# Patient Record
Sex: FEMALE | Race: WHITE | Employment: OTHER | URBAN - METROPOLITAN AREA
[De-identification: names, ages, dates, MRNs, and addresses within clinical notes are randomized per-mention and may not be internally consistent; named-entity substitution may affect disease eponyms.]

---

## 2017-01-20 DIAGNOSIS — F32.A DEPRESSIVE DISORDER: ICD-10-CM

## 2017-01-20 RX ORDER — VENLAFAXINE HYDROCHLORIDE 75 MG/1
CAPSULE, EXTENDED RELEASE ORAL
Qty: 90 CAPSULE | Refills: 3 | Status: SHIPPED | OUTPATIENT
Start: 2017-01-20 | End: 2018-02-15 | Stop reason: SDUPTHER

## 2017-01-24 DIAGNOSIS — I10 ESSENTIAL HYPERTENSION, BENIGN: ICD-10-CM

## 2017-01-25 RX ORDER — AMLODIPINE BESYLATE 5 MG/1
TABLET ORAL
Qty: 90 TABLET | Refills: 3 | Status: SHIPPED | OUTPATIENT
Start: 2017-01-25 | End: 2018-02-15 | Stop reason: SDUPTHER

## 2017-02-17 ENCOUNTER — OFFICE VISIT (OUTPATIENT)
Dept: FAMILY MEDICINE CLINIC | Age: 67
End: 2017-02-17

## 2017-02-17 VITALS
BODY MASS INDEX: 27.97 KG/M2 | HEART RATE: 68 BPM | SYSTOLIC BLOOD PRESSURE: 121 MMHG | WEIGHT: 174 LBS | HEIGHT: 66 IN | DIASTOLIC BLOOD PRESSURE: 67 MMHG

## 2017-02-17 DIAGNOSIS — E78.00 HYPERCHOLESTEROLEMIA: Primary | ICD-10-CM

## 2017-02-17 DIAGNOSIS — R35.0 URINARY FREQUENCY: ICD-10-CM

## 2017-02-17 DIAGNOSIS — F32.A DEPRESSIVE DISORDER: ICD-10-CM

## 2017-02-17 DIAGNOSIS — I10 ESSENTIAL HYPERTENSION: ICD-10-CM

## 2017-02-17 PROCEDURE — 99214 OFFICE O/P EST MOD 30 MIN: CPT | Performed by: FAMILY MEDICINE

## 2017-03-16 RX ORDER — OXYBUTYNIN CHLORIDE 5 MG/1
TABLET ORAL
Qty: 180 TABLET | Refills: 3 | Status: SHIPPED | OUTPATIENT
Start: 2017-03-16 | End: 2018-02-15 | Stop reason: SDUPTHER

## 2017-05-26 ENCOUNTER — OFFICE VISIT (OUTPATIENT)
Dept: FAMILY MEDICINE CLINIC | Age: 67
End: 2017-05-26

## 2017-05-26 VITALS
HEART RATE: 68 BPM | DIASTOLIC BLOOD PRESSURE: 69 MMHG | SYSTOLIC BLOOD PRESSURE: 123 MMHG | BODY MASS INDEX: 27.64 KG/M2 | HEIGHT: 66 IN | RESPIRATION RATE: 16 BRPM | WEIGHT: 172 LBS

## 2017-05-26 DIAGNOSIS — F32.A DEPRESSIVE DISORDER: ICD-10-CM

## 2017-05-26 DIAGNOSIS — I10 ESSENTIAL HYPERTENSION, BENIGN: ICD-10-CM

## 2017-05-26 DIAGNOSIS — J30.1 SEASONAL ALLERGIC RHINITIS DUE TO POLLEN: ICD-10-CM

## 2017-05-26 DIAGNOSIS — H91.93 HEARING LOSS, BILATERAL: ICD-10-CM

## 2017-05-26 DIAGNOSIS — Z23 NEED FOR PNEUMOCOCCAL VACCINATION: ICD-10-CM

## 2017-05-26 DIAGNOSIS — E78.00 HYPERCHOLESTEROLEMIA: Primary | ICD-10-CM

## 2017-05-26 LAB
A/G RATIO: 1.8 (ref 1.1–2.2)
ALBUMIN SERPL-MCNC: 4.6 G/DL (ref 3.4–5)
ALP BLD-CCNC: 78 U/L (ref 40–129)
ALT SERPL-CCNC: 18 U/L (ref 10–40)
ANION GAP SERPL CALCULATED.3IONS-SCNC: 13 MMOL/L (ref 3–16)
AST SERPL-CCNC: 21 U/L (ref 15–37)
BILIRUB SERPL-MCNC: 0.3 MG/DL (ref 0–1)
BUN BLDV-MCNC: 14 MG/DL (ref 7–20)
CALCIUM SERPL-MCNC: 10 MG/DL (ref 8.3–10.6)
CHLORIDE BLD-SCNC: 101 MMOL/L (ref 99–110)
CO2: 29 MMOL/L (ref 21–32)
CREAT SERPL-MCNC: 0.6 MG/DL (ref 0.6–1.2)
GFR AFRICAN AMERICAN: >60
GFR NON-AFRICAN AMERICAN: >60
GLOBULIN: 2.5 G/DL
GLUCOSE BLD-MCNC: 95 MG/DL (ref 70–99)
POTASSIUM SERPL-SCNC: 4.7 MMOL/L (ref 3.5–5.1)
SODIUM BLD-SCNC: 143 MMOL/L (ref 136–145)
TOTAL PROTEIN: 7.1 G/DL (ref 6.4–8.2)

## 2017-05-26 PROCEDURE — G8399 PT W/DXA RESULTS DOCUMENT: HCPCS | Performed by: FAMILY MEDICINE

## 2017-05-26 PROCEDURE — 3014F SCREEN MAMMO DOC REV: CPT | Performed by: FAMILY MEDICINE

## 2017-05-26 PROCEDURE — 1123F ACP DISCUSS/DSCN MKR DOCD: CPT | Performed by: FAMILY MEDICINE

## 2017-05-26 PROCEDURE — 1036F TOBACCO NON-USER: CPT | Performed by: FAMILY MEDICINE

## 2017-05-26 PROCEDURE — G0009 ADMIN PNEUMOCOCCAL VACCINE: HCPCS | Performed by: FAMILY MEDICINE

## 2017-05-26 PROCEDURE — 3017F COLORECTAL CA SCREEN DOC REV: CPT | Performed by: FAMILY MEDICINE

## 2017-05-26 PROCEDURE — 36415 COLL VENOUS BLD VENIPUNCTURE: CPT | Performed by: FAMILY MEDICINE

## 2017-05-26 PROCEDURE — G8427 DOCREV CUR MEDS BY ELIG CLIN: HCPCS | Performed by: FAMILY MEDICINE

## 2017-05-26 PROCEDURE — 99214 OFFICE O/P EST MOD 30 MIN: CPT | Performed by: FAMILY MEDICINE

## 2017-05-26 PROCEDURE — 1090F PRES/ABSN URINE INCON ASSESS: CPT | Performed by: FAMILY MEDICINE

## 2017-05-26 PROCEDURE — 4040F PNEUMOC VAC/ADMIN/RCVD: CPT | Performed by: FAMILY MEDICINE

## 2017-05-26 PROCEDURE — 90670 PCV13 VACCINE IM: CPT | Performed by: FAMILY MEDICINE

## 2017-05-26 PROCEDURE — G8420 CALC BMI NORM PARAMETERS: HCPCS | Performed by: FAMILY MEDICINE

## 2017-05-26 ASSESSMENT — PATIENT HEALTH QUESTIONNAIRE - PHQ9
SUM OF ALL RESPONSES TO PHQ QUESTIONS 1-9: 0
2. FEELING DOWN, DEPRESSED OR HOPELESS: 0
1. LITTLE INTEREST OR PLEASURE IN DOING THINGS: 0
SUM OF ALL RESPONSES TO PHQ9 QUESTIONS 1 & 2: 0

## 2017-07-06 ENCOUNTER — PATIENT MESSAGE (OUTPATIENT)
Dept: FAMILY MEDICINE CLINIC | Age: 67
End: 2017-07-06

## 2017-09-13 RX ORDER — SIMVASTATIN 20 MG
TABLET ORAL
Qty: 90 TABLET | Refills: 3 | Status: SHIPPED | OUTPATIENT
Start: 2017-09-13 | End: 2018-06-13 | Stop reason: SDUPTHER

## 2017-12-07 ENCOUNTER — OFFICE VISIT (OUTPATIENT)
Dept: FAMILY MEDICINE CLINIC | Age: 67
End: 2017-12-07

## 2017-12-07 VITALS
HEIGHT: 66 IN | DIASTOLIC BLOOD PRESSURE: 72 MMHG | WEIGHT: 173 LBS | HEART RATE: 76 BPM | BODY MASS INDEX: 27.8 KG/M2 | SYSTOLIC BLOOD PRESSURE: 118 MMHG | RESPIRATION RATE: 20 BRPM

## 2017-12-07 DIAGNOSIS — I10 ESSENTIAL HYPERTENSION, BENIGN: ICD-10-CM

## 2017-12-07 DIAGNOSIS — Z23 NEEDS FLU SHOT: ICD-10-CM

## 2017-12-07 DIAGNOSIS — E78.00 HYPERCHOLESTEROLEMIA: Primary | ICD-10-CM

## 2017-12-07 DIAGNOSIS — F32.A DEPRESSIVE DISORDER: ICD-10-CM

## 2017-12-07 LAB
A/G RATIO: 1.9 (ref 1.1–2.2)
ALBUMIN SERPL-MCNC: 4.7 G/DL (ref 3.4–5)
ALP BLD-CCNC: 94 U/L (ref 40–129)
ALT SERPL-CCNC: 18 U/L (ref 10–40)
ANION GAP SERPL CALCULATED.3IONS-SCNC: 13 MMOL/L (ref 3–16)
AST SERPL-CCNC: 20 U/L (ref 15–37)
BILIRUB SERPL-MCNC: 0.4 MG/DL (ref 0–1)
BUN BLDV-MCNC: 14 MG/DL (ref 7–20)
CALCIUM SERPL-MCNC: 9.7 MG/DL (ref 8.3–10.6)
CHLORIDE BLD-SCNC: 102 MMOL/L (ref 99–110)
CHOLESTEROL, TOTAL: 215 MG/DL (ref 0–199)
CO2: 30 MMOL/L (ref 21–32)
CREAT SERPL-MCNC: 0.6 MG/DL (ref 0.6–1.2)
GFR AFRICAN AMERICAN: >60
GFR NON-AFRICAN AMERICAN: >60
GLOBULIN: 2.5 G/DL
GLUCOSE BLD-MCNC: 96 MG/DL (ref 70–99)
HDLC SERPL-MCNC: 103 MG/DL (ref 40–60)
LDL CHOLESTEROL CALCULATED: 88 MG/DL
POTASSIUM SERPL-SCNC: 4.5 MMOL/L (ref 3.5–5.1)
SODIUM BLD-SCNC: 145 MMOL/L (ref 136–145)
TOTAL PROTEIN: 7.2 G/DL (ref 6.4–8.2)
TRIGL SERPL-MCNC: 121 MG/DL (ref 0–150)
VLDLC SERPL CALC-MCNC: 24 MG/DL

## 2017-12-07 PROCEDURE — 90630 INFLUENZA, QUADV, 18-64 YRS, ID, PF, MICRO INJ, 0.1ML (FLUZONE QUADV, PF): CPT | Performed by: FAMILY MEDICINE

## 2017-12-07 PROCEDURE — 1036F TOBACCO NON-USER: CPT | Performed by: FAMILY MEDICINE

## 2017-12-07 PROCEDURE — 1123F ACP DISCUSS/DSCN MKR DOCD: CPT | Performed by: FAMILY MEDICINE

## 2017-12-07 PROCEDURE — 99214 OFFICE O/P EST MOD 30 MIN: CPT | Performed by: FAMILY MEDICINE

## 2017-12-07 PROCEDURE — 3017F COLORECTAL CA SCREEN DOC REV: CPT | Performed by: FAMILY MEDICINE

## 2017-12-07 PROCEDURE — 4040F PNEUMOC VAC/ADMIN/RCVD: CPT | Performed by: FAMILY MEDICINE

## 2017-12-07 PROCEDURE — G8427 DOCREV CUR MEDS BY ELIG CLIN: HCPCS | Performed by: FAMILY MEDICINE

## 2017-12-07 PROCEDURE — G8399 PT W/DXA RESULTS DOCUMENT: HCPCS | Performed by: FAMILY MEDICINE

## 2017-12-07 PROCEDURE — G8417 CALC BMI ABV UP PARAM F/U: HCPCS | Performed by: FAMILY MEDICINE

## 2017-12-07 PROCEDURE — G0008 ADMIN INFLUENZA VIRUS VAC: HCPCS | Performed by: FAMILY MEDICINE

## 2017-12-07 PROCEDURE — G8484 FLU IMMUNIZE NO ADMIN: HCPCS | Performed by: FAMILY MEDICINE

## 2017-12-07 PROCEDURE — 36415 COLL VENOUS BLD VENIPUNCTURE: CPT | Performed by: FAMILY MEDICINE

## 2017-12-07 PROCEDURE — 1090F PRES/ABSN URINE INCON ASSESS: CPT | Performed by: FAMILY MEDICINE

## 2017-12-07 NOTE — PROGRESS NOTES
Chief Complaint   Patient presents with    Hypertension    Hyperlipidemia       HPI: Ashley Diamond presents for evaluation and management of Multiple medical problems. Ligia Hassan notes she is taking and tolerating her Zocor denies any muscle pain with that. Notes no abdominal pain either. She reports she is taking and tolerating her blood pressure medications    She notes her mood is good denies any suicidal or homicidal ideation no racing thoughts and is compliant with her Effexor. She is looking for to the holidays with her family    In the last 3 months she's had 2 urinary tract infections in is a little worried about that    ROS: no fever or chills, no cough, no sob, no chest pain, no palpitation, no abd pain, no n/v/d/c, no urinary frequency or dysuria,     No Known Allergies  New Prescriptions    No medications on file     Current Outpatient Prescriptions   Medication Sig Dispense Refill    simvastatin (ZOCOR) 20 MG tablet TAKE 1 TABLET BY MOUTH DAILY 90 tablet 3    oxybutynin (DITROPAN) 5 MG tablet TAKE 1 TABLET BY MOUTH TWICE DAILY 180 tablet 3    metoprolol tartrate (LOPRESSOR) 25 MG tablet TAKE 1 TABLET BY MOUTH TWICE DAILY 180 tablet 3    amLODIPine (NORVASC) 5 MG tablet TAKE 1 TABLET BY MOUTH EVERY DAY 90 tablet 3    venlafaxine (EFFEXOR XR) 75 MG extended release capsule TAKE 1 CAPSULE BY MOUTH DAILY 90 capsule 3    azelastine (ASTELIN) 0.1 % nasal spray 2 sprays by Nasal route 2 times daily Use in each nostril as directed 1 Bottle 3    fluticasone (FLONASE) 50 MCG/ACT nasal spray INSTILL 1 SPRAY INTO THE NOSTRILS DAILY 1 Bottle 0    Omega-3 Fatty Acids (FISH OIL PO) Take  by mouth.  Multiple Vitamins-Minerals (THERAPEUTIC MULTIVITAMIN-MINERALS) tablet Take 1 tablet by mouth daily.  Ascorbic Acid (VITAMIN C) 500 MG tablet Take 500 mg by mouth daily.  vitamin D (ERGOCALCIFEROL) 70417 UNITS CAPS capsule Take 50,000 Units by mouth once a week.        No current 11/18/2016    HDL 82 (H) 07/13/2015    HDL 81 (H) 07/10/2014     Lab Results   Component Value Date    LDLCALC 111 (H) 11/18/2016    LDLCALC 111 (H) 07/13/2015    LDLCALC 102 (H) 07/10/2014     Lab Results   Component Value Date    LABVLDL 42 11/18/2016    LABVLDL 28 07/13/2015    LABVLDL 32 07/10/2014         Assessment   Plan     1. Hypercholesterolemia  Doing well: We will check labs today and follow up in 6 months continue medications  - Comprehensive Metabolic Panel  - Lipid Panel    2. Essential hypertension, benign  Controlled with medication: Check labs today follow-up 6 months  - Comprehensive Metabolic Panel    3. Depressive disorder  Appears stable and controlled on medication. Continue and recheck 6 months    4. Needs flu shot  Davidbeatriz Duboisleandra received counseling on the following healthy behaviors: Prevention of urinary tract infections    Discussed use, benefit, and side effects of prescribed medications. Barriers to medication compliance addressed. All patient questions answered. Pt voiced understanding.        RTC in 6 months

## 2017-12-07 NOTE — PROGRESS NOTES
Vaccine Information Sheet, \"Influenza - Inactivated\"  given to [place] Reasons, or parent/legal guardian of  [place] Reasons and verbalized understanding. Patient responses:    Have you ever had a reaction to a flu vaccine? No  Are you able to eat eggs without adverse effects? Yes  Do you have any current illness? No  Have you ever had Guillian Cleveland Syndrome? No    Flu vaccine given per order. Please see immunization tab.

## 2018-02-14 ENCOUNTER — PATIENT MESSAGE (OUTPATIENT)
Dept: FAMILY MEDICINE CLINIC | Age: 68
End: 2018-02-14

## 2018-02-14 NOTE — TELEPHONE ENCOUNTER
From: Kevin Viveros  To: Deepak Guerrero MD  Sent: 2/14/2018 4:50 PM EST  Subject: Prescription Question    I have changed my pharmacy to [facility] on [address] [place] [address] [phone]. Thank you.     Rafael Cristobal

## 2018-02-15 DIAGNOSIS — I10 ESSENTIAL HYPERTENSION, BENIGN: ICD-10-CM

## 2018-02-15 DIAGNOSIS — I10 ESSENTIAL HYPERTENSION: ICD-10-CM

## 2018-02-15 DIAGNOSIS — F32.A DEPRESSIVE DISORDER: ICD-10-CM

## 2018-02-15 RX ORDER — VENLAFAXINE HYDROCHLORIDE 75 MG/1
75 CAPSULE, EXTENDED RELEASE ORAL DAILY
Qty: 90 CAPSULE | Refills: 3 | Status: SHIPPED | OUTPATIENT
Start: 2018-02-15 | End: 2019-02-27 | Stop reason: SDUPTHER

## 2018-02-15 RX ORDER — SIMVASTATIN 20 MG
20 TABLET ORAL NIGHTLY
Qty: 90 TABLET | Refills: 3 | Status: CANCELLED | OUTPATIENT
Start: 2018-02-15

## 2018-02-15 RX ORDER — OXYBUTYNIN CHLORIDE 5 MG/1
5 TABLET ORAL 2 TIMES DAILY
Qty: 180 TABLET | Refills: 3 | Status: SHIPPED | OUTPATIENT
Start: 2018-02-15 | End: 2019-05-13

## 2018-02-15 RX ORDER — AMLODIPINE BESYLATE 5 MG/1
5 TABLET ORAL DAILY
Qty: 90 TABLET | Refills: 3 | Status: SHIPPED | OUTPATIENT
Start: 2018-02-15 | End: 2019-02-27 | Stop reason: SDUPTHER

## 2018-05-31 ENCOUNTER — TELEPHONE (OUTPATIENT)
Dept: FAMILY MEDICINE CLINIC | Age: 68
End: 2018-05-31

## 2018-06-01 ENCOUNTER — PATIENT MESSAGE (OUTPATIENT)
Dept: FAMILY MEDICINE CLINIC | Age: 68
End: 2018-06-01

## 2018-06-07 ENCOUNTER — OFFICE VISIT (OUTPATIENT)
Dept: FAMILY MEDICINE CLINIC | Age: 68
End: 2018-06-07

## 2018-06-07 VITALS
HEIGHT: 66 IN | DIASTOLIC BLOOD PRESSURE: 75 MMHG | HEART RATE: 65 BPM | BODY MASS INDEX: 28.12 KG/M2 | SYSTOLIC BLOOD PRESSURE: 121 MMHG | WEIGHT: 175 LBS

## 2018-06-07 DIAGNOSIS — Z23 NEED FOR PROPHYLACTIC VACCINATION AGAINST STREPTOCOCCUS PNEUMONIAE (PNEUMOCOCCUS): ICD-10-CM

## 2018-06-07 DIAGNOSIS — E78.00 HYPERCHOLESTEROLEMIA: ICD-10-CM

## 2018-06-07 DIAGNOSIS — I10 ESSENTIAL HYPERTENSION, BENIGN: Primary | ICD-10-CM

## 2018-06-07 DIAGNOSIS — F32.A DEPRESSIVE DISORDER: ICD-10-CM

## 2018-06-07 PROCEDURE — G8399 PT W/DXA RESULTS DOCUMENT: HCPCS | Performed by: FAMILY MEDICINE

## 2018-06-07 PROCEDURE — G8417 CALC BMI ABV UP PARAM F/U: HCPCS | Performed by: FAMILY MEDICINE

## 2018-06-07 PROCEDURE — 1090F PRES/ABSN URINE INCON ASSESS: CPT | Performed by: FAMILY MEDICINE

## 2018-06-07 PROCEDURE — 1036F TOBACCO NON-USER: CPT | Performed by: FAMILY MEDICINE

## 2018-06-07 PROCEDURE — 99214 OFFICE O/P EST MOD 30 MIN: CPT | Performed by: FAMILY MEDICINE

## 2018-06-07 PROCEDURE — 4040F PNEUMOC VAC/ADMIN/RCVD: CPT | Performed by: FAMILY MEDICINE

## 2018-06-07 PROCEDURE — G0009 ADMIN PNEUMOCOCCAL VACCINE: HCPCS | Performed by: FAMILY MEDICINE

## 2018-06-07 PROCEDURE — G8427 DOCREV CUR MEDS BY ELIG CLIN: HCPCS | Performed by: FAMILY MEDICINE

## 2018-06-07 PROCEDURE — 90732 PPSV23 VACC 2 YRS+ SUBQ/IM: CPT | Performed by: FAMILY MEDICINE

## 2018-06-07 PROCEDURE — 3017F COLORECTAL CA SCREEN DOC REV: CPT | Performed by: FAMILY MEDICINE

## 2018-06-07 PROCEDURE — 1123F ACP DISCUSS/DSCN MKR DOCD: CPT | Performed by: FAMILY MEDICINE

## 2018-06-07 ASSESSMENT — PATIENT HEALTH QUESTIONNAIRE - PHQ9
2. FEELING DOWN, DEPRESSED OR HOPELESS: 0
SUM OF ALL RESPONSES TO PHQ QUESTIONS 1-9: 0
1. LITTLE INTEREST OR PLEASURE IN DOING THINGS: 0
SUM OF ALL RESPONSES TO PHQ9 QUESTIONS 1 & 2: 0

## 2018-06-07 ASSESSMENT — ENCOUNTER SYMPTOMS
COUGH: 0
CONSTIPATION: 0
ABDOMINAL PAIN: 0
SHORTNESS OF BREATH: 0
VOMITING: 0
DIARRHEA: 0
NAUSEA: 0

## 2018-06-13 RX ORDER — SIMVASTATIN 20 MG
TABLET ORAL
Qty: 90 TABLET | Refills: 3 | Status: SHIPPED | OUTPATIENT
Start: 2018-06-13 | End: 2019-07-08 | Stop reason: SDUPTHER

## 2018-07-16 ENCOUNTER — OFFICE VISIT (OUTPATIENT)
Dept: FAMILY MEDICINE CLINIC | Age: 68
End: 2018-07-16

## 2018-07-16 VITALS
HEART RATE: 70 BPM | WEIGHT: 172 LBS | SYSTOLIC BLOOD PRESSURE: 129 MMHG | DIASTOLIC BLOOD PRESSURE: 75 MMHG | BODY MASS INDEX: 27.64 KG/M2 | HEIGHT: 66 IN

## 2018-07-16 DIAGNOSIS — J30.1 SEASONAL ALLERGIC RHINITIS DUE TO POLLEN, UNSPECIFIED CHRONICITY: ICD-10-CM

## 2018-07-16 DIAGNOSIS — Z85.3 HISTORY OF BREAST CANCER: ICD-10-CM

## 2018-07-16 DIAGNOSIS — R51.9 NONINTRACTABLE HEADACHE, UNSPECIFIED CHRONICITY PATTERN, UNSPECIFIED HEADACHE TYPE: Primary | ICD-10-CM

## 2018-07-16 DIAGNOSIS — H91.93 BILATERAL HEARING LOSS, UNSPECIFIED HEARING LOSS TYPE: ICD-10-CM

## 2018-07-16 PROCEDURE — 1090F PRES/ABSN URINE INCON ASSESS: CPT | Performed by: INTERNAL MEDICINE

## 2018-07-16 PROCEDURE — 99213 OFFICE O/P EST LOW 20 MIN: CPT | Performed by: INTERNAL MEDICINE

## 2018-07-16 PROCEDURE — G8399 PT W/DXA RESULTS DOCUMENT: HCPCS | Performed by: INTERNAL MEDICINE

## 2018-07-16 PROCEDURE — G8427 DOCREV CUR MEDS BY ELIG CLIN: HCPCS | Performed by: INTERNAL MEDICINE

## 2018-07-16 PROCEDURE — G8417 CALC BMI ABV UP PARAM F/U: HCPCS | Performed by: INTERNAL MEDICINE

## 2018-07-16 PROCEDURE — 1101F PT FALLS ASSESS-DOCD LE1/YR: CPT | Performed by: INTERNAL MEDICINE

## 2018-07-16 PROCEDURE — 4040F PNEUMOC VAC/ADMIN/RCVD: CPT | Performed by: INTERNAL MEDICINE

## 2018-07-16 PROCEDURE — 3017F COLORECTAL CA SCREEN DOC REV: CPT | Performed by: INTERNAL MEDICINE

## 2018-07-16 PROCEDURE — 1123F ACP DISCUSS/DSCN MKR DOCD: CPT | Performed by: INTERNAL MEDICINE

## 2018-07-16 PROCEDURE — 1036F TOBACCO NON-USER: CPT | Performed by: INTERNAL MEDICINE

## 2018-07-16 RX ORDER — CEFUROXIME AXETIL 250 MG/1
TABLET ORAL
Qty: 20 TABLET | Refills: 0 | Status: SHIPPED | OUTPATIENT
Start: 2018-07-16 | End: 2018-10-18 | Stop reason: CLARIF

## 2018-07-16 NOTE — PROGRESS NOTES
HPI: Windy Cool presents for  Intermittent noise in head    3 days ago onset of an intermittent whooshing sounds in the head. Occurs about every 15 minutes. States she had this for years ago and was diagnosed with a sinus infection. 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 Denies any other neurologic abnormalities. States she does not have Ménière's. Has had history of vertigo in the past however this feels different. Tinea and have difficulty today. Positive malaise and nausea. No vomiting. Diarrhea yesterday. + frontal head pain with nausea. Yesterday rested secondary to headache. No photophobia or trauma. Has had some mosquito bites. + light headed.  drove today. Nausea improved, but present. Similar sxs 4 years ago which was treated as a sinus infection. + chills. No fevers. No positional component to headache. Took sudafed and tylenol. No cough, RUIZ, post nasal drip. No nasal spray x 5 days. No history of concussion or seizures. No recreational drugs. No increased stressors. No trauma positive history breast cancer    She denies any dysarthria, weakness, or cognitive difficulty. PMH:   breast cancer, mastectomy   hysterectomy    Appendectomy     Unilateral OOP    Social history: . Negative tobacco. Rare alcohol. Family history hearing deficit, hyperlipidemia, hypertension, breast cancer, stroke.     Review of systems: Vertigo, hearing loss, mood disorder, hypertension, depression, rhinitis,      No Known Allergies    Outpatient Prescriptions Marked as Taking for the 7/16/18 encounter (Office Visit) with Devin Herrera MD   Medication Sig Dispense Refill    simvastatin (ZOCOR) 20 MG tablet TAKE ONE TABLET BY MOUTH DAILY 90 tablet 3    Probiotic Product (PROBIOTIC DAILY PO) Take by mouth      metoprolol tartrate (LOPRESSOR) 25 MG tablet TAKE 1 TABLET BY MOUTH TWICE DAILY 180 tablet 3    amLODIPine (NORVASC) 5 MG tablet Take 1 tablet by mouth daily

## 2018-07-16 NOTE — PATIENT INSTRUCTIONS
Start nasal sprays  Fill antibiotic  Follow up Dr Shiva Crenshaw 2 weeks  If change in sxs or worsing prior to then call and will refer ENT

## 2018-10-18 ENCOUNTER — OFFICE VISIT (OUTPATIENT)
Dept: FAMILY MEDICINE CLINIC | Age: 68
End: 2018-10-18
Payer: MEDICARE

## 2018-10-18 VITALS
DIASTOLIC BLOOD PRESSURE: 79 MMHG | HEIGHT: 66 IN | HEART RATE: 76 BPM | SYSTOLIC BLOOD PRESSURE: 154 MMHG | BODY MASS INDEX: 27.97 KG/M2 | WEIGHT: 174 LBS

## 2018-10-18 DIAGNOSIS — H81.10 BENIGN PAROXYSMAL POSITIONAL VERTIGO, UNSPECIFIED LATERALITY: Primary | ICD-10-CM

## 2018-10-18 PROCEDURE — 1123F ACP DISCUSS/DSCN MKR DOCD: CPT | Performed by: FAMILY MEDICINE

## 2018-10-18 PROCEDURE — 1101F PT FALLS ASSESS-DOCD LE1/YR: CPT | Performed by: FAMILY MEDICINE

## 2018-10-18 PROCEDURE — G8417 CALC BMI ABV UP PARAM F/U: HCPCS | Performed by: FAMILY MEDICINE

## 2018-10-18 PROCEDURE — 99213 OFFICE O/P EST LOW 20 MIN: CPT | Performed by: FAMILY MEDICINE

## 2018-10-18 PROCEDURE — 1090F PRES/ABSN URINE INCON ASSESS: CPT | Performed by: FAMILY MEDICINE

## 2018-10-18 PROCEDURE — G8399 PT W/DXA RESULTS DOCUMENT: HCPCS | Performed by: FAMILY MEDICINE

## 2018-10-18 PROCEDURE — 3017F COLORECTAL CA SCREEN DOC REV: CPT | Performed by: FAMILY MEDICINE

## 2018-10-18 PROCEDURE — G8484 FLU IMMUNIZE NO ADMIN: HCPCS | Performed by: FAMILY MEDICINE

## 2018-10-18 PROCEDURE — 4040F PNEUMOC VAC/ADMIN/RCVD: CPT | Performed by: FAMILY MEDICINE

## 2018-10-18 PROCEDURE — 1036F TOBACCO NON-USER: CPT | Performed by: FAMILY MEDICINE

## 2018-10-18 PROCEDURE — G8427 DOCREV CUR MEDS BY ELIG CLIN: HCPCS | Performed by: FAMILY MEDICINE

## 2018-10-18 RX ORDER — MECLIZINE HCL 12.5 MG/1
12.5 TABLET ORAL 3 TIMES DAILY PRN
Qty: 30 TABLET | Refills: 0 | Status: SHIPPED | OUTPATIENT
Start: 2018-10-18 | End: 2018-10-28

## 2018-10-18 RX ORDER — ONDANSETRON 4 MG/1
4 TABLET, FILM COATED ORAL EVERY 8 HOURS PRN
Qty: 20 TABLET | Refills: 0 | Status: SHIPPED | OUTPATIENT
Start: 2018-10-18 | End: 2019-05-13

## 2018-10-18 ASSESSMENT — ENCOUNTER SYMPTOMS
VOMITING: 0
SHORTNESS OF BREATH: 0
SINUS PAIN: 1
COUGH: 0
CONSTIPATION: 0
NAUSEA: 1
DIARRHEA: 0
SINUS PRESSURE: 1
ABDOMINAL PAIN: 0

## 2018-11-14 ENCOUNTER — TELEPHONE (OUTPATIENT)
Dept: FAMILY MEDICINE CLINIC | Age: 68
End: 2018-11-14

## 2018-11-14 NOTE — TELEPHONE ENCOUNTER
Attempted to contact patient on 11/14/2018. Result: no answer at the patient's number, no voicemail available. Pre-Visit planning not completed.

## 2018-11-19 ENCOUNTER — OFFICE VISIT (OUTPATIENT)
Dept: FAMILY MEDICINE CLINIC | Age: 68
End: 2018-11-19
Payer: MEDICARE

## 2018-11-19 VITALS
BODY MASS INDEX: 27.48 KG/M2 | HEIGHT: 66 IN | HEART RATE: 69 BPM | SYSTOLIC BLOOD PRESSURE: 130 MMHG | DIASTOLIC BLOOD PRESSURE: 78 MMHG | WEIGHT: 171 LBS

## 2018-11-19 DIAGNOSIS — I10 ESSENTIAL HYPERTENSION, BENIGN: Primary | ICD-10-CM

## 2018-11-19 DIAGNOSIS — J30.1 SEASONAL ALLERGIC RHINITIS DUE TO POLLEN: ICD-10-CM

## 2018-11-19 DIAGNOSIS — F32.A DEPRESSIVE DISORDER: ICD-10-CM

## 2018-11-19 DIAGNOSIS — E78.00 HYPERCHOLESTEROLEMIA: ICD-10-CM

## 2018-11-19 DIAGNOSIS — Z11.59 NEED FOR HEPATITIS C SCREENING TEST: ICD-10-CM

## 2018-11-19 DIAGNOSIS — R01.1 HEART MURMUR, SYSTOLIC: ICD-10-CM

## 2018-11-19 DIAGNOSIS — Z23 NEED FOR VACCINATION: ICD-10-CM

## 2018-11-19 LAB
A/G RATIO: 1.7 (ref 1.1–2.2)
ALBUMIN SERPL-MCNC: 4.6 G/DL (ref 3.4–5)
ALP BLD-CCNC: 87 U/L (ref 40–129)
ALT SERPL-CCNC: 23 U/L (ref 10–40)
ANION GAP SERPL CALCULATED.3IONS-SCNC: 12 MMOL/L (ref 3–16)
AST SERPL-CCNC: 23 U/L (ref 15–37)
BILIRUB SERPL-MCNC: 0.4 MG/DL (ref 0–1)
BUN BLDV-MCNC: 15 MG/DL (ref 7–20)
CALCIUM SERPL-MCNC: 10.1 MG/DL (ref 8.3–10.6)
CHLORIDE BLD-SCNC: 102 MMOL/L (ref 99–110)
CHOLESTEROL, TOTAL: 193 MG/DL (ref 0–199)
CO2: 30 MMOL/L (ref 21–32)
CREAT SERPL-MCNC: 0.6 MG/DL (ref 0.6–1.2)
GFR AFRICAN AMERICAN: >60
GFR NON-AFRICAN AMERICAN: >60
GLOBULIN: 2.7 G/DL
GLUCOSE BLD-MCNC: 96 MG/DL (ref 70–99)
HDLC SERPL-MCNC: 71 MG/DL (ref 40–60)
HEPATITIS C ANTIBODY INTERPRETATION: NORMAL
LDL CHOLESTEROL CALCULATED: 93 MG/DL
POTASSIUM SERPL-SCNC: 4.7 MMOL/L (ref 3.5–5.1)
SODIUM BLD-SCNC: 144 MMOL/L (ref 136–145)
TOTAL PROTEIN: 7.3 G/DL (ref 6.4–8.2)
TRIGL SERPL-MCNC: 147 MG/DL (ref 0–150)
VLDLC SERPL CALC-MCNC: 29 MG/DL

## 2018-11-19 PROCEDURE — 36415 COLL VENOUS BLD VENIPUNCTURE: CPT | Performed by: FAMILY MEDICINE

## 2018-11-19 PROCEDURE — 1101F PT FALLS ASSESS-DOCD LE1/YR: CPT | Performed by: FAMILY MEDICINE

## 2018-11-19 PROCEDURE — 1123F ACP DISCUSS/DSCN MKR DOCD: CPT | Performed by: FAMILY MEDICINE

## 2018-11-19 PROCEDURE — G8399 PT W/DXA RESULTS DOCUMENT: HCPCS | Performed by: FAMILY MEDICINE

## 2018-11-19 PROCEDURE — 99215 OFFICE O/P EST HI 40 MIN: CPT | Performed by: FAMILY MEDICINE

## 2018-11-19 PROCEDURE — G8482 FLU IMMUNIZE ORDER/ADMIN: HCPCS | Performed by: FAMILY MEDICINE

## 2018-11-19 PROCEDURE — 90682 RIV4 VACC RECOMBINANT DNA IM: CPT | Performed by: FAMILY MEDICINE

## 2018-11-19 PROCEDURE — 4040F PNEUMOC VAC/ADMIN/RCVD: CPT | Performed by: FAMILY MEDICINE

## 2018-11-19 PROCEDURE — 1036F TOBACCO NON-USER: CPT | Performed by: FAMILY MEDICINE

## 2018-11-19 PROCEDURE — 3017F COLORECTAL CA SCREEN DOC REV: CPT | Performed by: FAMILY MEDICINE

## 2018-11-19 PROCEDURE — G8427 DOCREV CUR MEDS BY ELIG CLIN: HCPCS | Performed by: FAMILY MEDICINE

## 2018-11-19 PROCEDURE — G8417 CALC BMI ABV UP PARAM F/U: HCPCS | Performed by: FAMILY MEDICINE

## 2018-11-19 PROCEDURE — G0008 ADMIN INFLUENZA VIRUS VAC: HCPCS | Performed by: FAMILY MEDICINE

## 2018-11-19 PROCEDURE — 1090F PRES/ABSN URINE INCON ASSESS: CPT | Performed by: FAMILY MEDICINE

## 2018-11-19 RX ORDER — AZELASTINE 1 MG/ML
2 SPRAY, METERED NASAL 2 TIMES DAILY
Qty: 1 BOTTLE | Refills: 3 | Status: SHIPPED | OUTPATIENT
Start: 2018-11-19 | End: 2021-07-26

## 2018-11-19 ASSESSMENT — ENCOUNTER SYMPTOMS
VOMITING: 0
NAUSEA: 0
RHINORRHEA: 0
EYE PAIN: 0
DIARRHEA: 0
SHORTNESS OF BREATH: 0
CONSTIPATION: 0
COUGH: 0
ABDOMINAL PAIN: 0
COLOR CHANGE: 0
SORE THROAT: 0

## 2018-11-19 NOTE — PROGRESS NOTES
continue current management and monitor for adverse reaction and disease progression. Follow-up as noted below      4. Need for hepatitis C screening test  Screen hepatitis C  - Hepatitis C Antibody    5. Need for vaccination  Recommended vaccination  - zoster recombinant adjuvanted vaccine [facility]) 50 MCG/0.5ML SUSR injection; Inject 0.5 mLs into the muscle once for 1 dose  Dispense: 0.5 mL; Refill: 0  - INFLUENZA, QUADV, RECOMBINANT, 18 YRS AND OLDER, IM, PF, PREFILL SYR OR SDV, 0.5ML (FLUBLOK QUADV, PF)    6. Heart murmur, systolic  We will send for echocardiogram to characterize  - ECHO Complete 2D W Doppler W Color; Future    7. Seasonal allergic rhinitis due to pollen  Stable: Continue meds and follow  - azelastine (ASTELIN) 0.1 % nasal spray; 2 sprays by Nasal route 2 times daily Use in each nostril as directed  Dispense: 1 Bottle; Refill: 3      RTC 6 months and as needed    Scribe attestation:  Ritesh Gee MA, am scribing for and in the presence of Danny Corral MD. Electronically signed by Christiano Guidry MA on 11/19/2018 at 10:07 AM            Provider attestation: Joni Cast MD, personally performed the services scribed by the user listed above in my presence, and it is both accurate and complete. I agree with the ROS and Past Histories independently gathered by the clinical support staff and the remaining scribed note accurately describes my personal service to the patient.     [facility]    11/19/2018  10:09 AM

## 2019-01-17 ENCOUNTER — HOSPITAL ENCOUNTER (OUTPATIENT)
Dept: NON INVASIVE DIAGNOSTICS | Age: 69
Discharge: HOME | End: 2019-01-17
Payer: MEDICARE

## 2019-01-17 DIAGNOSIS — R01.1 HEART MURMUR, SYSTOLIC: ICD-10-CM

## 2019-01-17 LAB
LV EF: 58 %
LVEF MODALITY: NORMAL

## 2019-01-17 PROCEDURE — 93306 TTE W/DOPPLER COMPLETE: CPT

## 2019-02-27 DIAGNOSIS — F32.A DEPRESSIVE DISORDER: ICD-10-CM

## 2019-02-27 DIAGNOSIS — I10 ESSENTIAL HYPERTENSION: ICD-10-CM

## 2019-02-27 DIAGNOSIS — I10 ESSENTIAL HYPERTENSION, BENIGN: ICD-10-CM

## 2019-02-27 RX ORDER — AMLODIPINE BESYLATE 5 MG/1
5 TABLET ORAL DAILY
Qty: 90 TABLET | Refills: 3 | Status: SHIPPED | OUTPATIENT
Start: 2019-02-27 | End: 2020-02-21

## 2019-02-27 RX ORDER — VENLAFAXINE HYDROCHLORIDE 75 MG/1
75 CAPSULE, EXTENDED RELEASE ORAL DAILY
Qty: 90 CAPSULE | Refills: 3 | Status: SHIPPED | OUTPATIENT
Start: 2019-02-27 | End: 2020-02-21

## 2019-05-07 ENCOUNTER — TELEPHONE (OUTPATIENT)
Dept: PRIMARY CARE CLINIC | Age: 69
End: 2019-05-07

## 2019-05-07 NOTE — TELEPHONE ENCOUNTER
Attempted to contact patient on 5/7/2019. Result: left message on the patient's voicemail asking patient to return my call. Pre-Visit planning not completed.            Layla Carey  Patient Services Specialist  [phone]

## 2019-05-08 NOTE — TELEPHONE ENCOUNTER
Attempted to contact patient on 5/8/2019. Result: left message on the patient's voicemail asking patient to return my call. Pt left a voicemail on my desk phone; I called her back & got her voicemail. Pre-Visit planning not completed.            Jazmín Santizo  Patient Services Specialist  [phone]

## 2019-05-13 RX ORDER — ACETAMINOPHEN 160 MG
2000 TABLET,DISINTEGRATING ORAL DAILY
COMMUNITY

## 2019-05-13 NOTE — TELEPHONE ENCOUNTER
Dr. Kaylene Braswell 05/21:    Notes: pt informed of shingles vaccine if she wants it. Not due for any labs. This patient has an upcoming appointment with you for Hyperlipidemia. In planning for that visit I have completed the following pre-visit planning:     Pre-Visit Planning Checklist:  Patient contacted: yes  Verified patient by name and date of birth: yes    Health Maintenance items reviewed:    No pre-visit planning health maintenance topics to review at this time    Labs and procedures pended: Non-Fasting none   Labs and procedures discussed with patient: no  Reminded patient to check with their insurance company about coverage for lab tests and lab location: no    Preliminary Medication Reconciliation: was performed. Reminded patient to arrive early: yes    Please complete the med-reconciliation and sign the appropriate labs as soon as possible.       Jeremy Marc  Patient Services Specialist  [phone]

## 2019-05-21 ENCOUNTER — OFFICE VISIT (OUTPATIENT)
Dept: PRIMARY CARE CLINIC | Age: 69
End: 2019-05-21
Payer: MEDICARE

## 2019-05-21 VITALS
BODY MASS INDEX: 26.78 KG/M2 | WEIGHT: 166.6 LBS | SYSTOLIC BLOOD PRESSURE: 128 MMHG | HEART RATE: 68 BPM | HEIGHT: 66 IN | DIASTOLIC BLOOD PRESSURE: 60 MMHG

## 2019-05-21 DIAGNOSIS — I10 ESSENTIAL HYPERTENSION, BENIGN: Primary | ICD-10-CM

## 2019-05-21 DIAGNOSIS — F32.A DEPRESSIVE DISORDER: ICD-10-CM

## 2019-05-21 DIAGNOSIS — E78.00 HYPERCHOLESTEROLEMIA: ICD-10-CM

## 2019-05-21 PROCEDURE — G8399 PT W/DXA RESULTS DOCUMENT: HCPCS | Performed by: FAMILY MEDICINE

## 2019-05-21 PROCEDURE — G8427 DOCREV CUR MEDS BY ELIG CLIN: HCPCS | Performed by: FAMILY MEDICINE

## 2019-05-21 PROCEDURE — 4040F PNEUMOC VAC/ADMIN/RCVD: CPT | Performed by: FAMILY MEDICINE

## 2019-05-21 PROCEDURE — G8417 CALC BMI ABV UP PARAM F/U: HCPCS | Performed by: FAMILY MEDICINE

## 2019-05-21 PROCEDURE — 1090F PRES/ABSN URINE INCON ASSESS: CPT | Performed by: FAMILY MEDICINE

## 2019-05-21 PROCEDURE — 99214 OFFICE O/P EST MOD 30 MIN: CPT | Performed by: FAMILY MEDICINE

## 2019-05-21 PROCEDURE — 1123F ACP DISCUSS/DSCN MKR DOCD: CPT | Performed by: FAMILY MEDICINE

## 2019-05-21 PROCEDURE — 1036F TOBACCO NON-USER: CPT | Performed by: FAMILY MEDICINE

## 2019-05-21 PROCEDURE — 3017F COLORECTAL CA SCREEN DOC REV: CPT | Performed by: FAMILY MEDICINE

## 2019-05-21 ASSESSMENT — ENCOUNTER SYMPTOMS
COUGH: 0
ABDOMINAL PAIN: 0
DIARRHEA: 0
CONSTIPATION: 0
SHORTNESS OF BREATH: 0
NAUSEA: 0
VOMITING: 0

## 2019-05-21 NOTE — PROGRESS NOTES
Chief Complaint   Patient presents with    Hypertension    Hyperlipidemia    Depression         HPI:  Sophy Bell is a 80 y.o. (:1950) here today for multiple medical problems. She is compliant with her blood pressure medications  without Lightheadedness, Urinary Frequency, Edema and Sedation  She is not checking Home Blood Pressures          She is compliant with her cholesterol medication without myalgia and abdominal pain      She is compliant with her  medication for depression. She reports these have been effective  Currently she has no symptoms. They are not having side effects of sedation, , sexual dysfunction, , nausea, , weight gain,  and  weight loss. Review of Systems   Constitutional: Negative for chills and fever. Respiratory: Negative for cough and shortness of breath. Cardiovascular: Negative for chest pain and palpitations. Gastrointestinal: Negative for abdominal pain, constipation, diarrhea, nausea and vomiting. Endocrine: Negative for polyuria. Genitourinary: Negative for dysuria.        Past Medical History:   Diagnosis Date    Allergic rhinitis     Basal cell cancer May 2014    Forehead: Dr. Farrukh Novak Depressive disorder     Hearing loss     HTN (hypertension)     Hx of bilateral mastectomy 2015    Hypercholesterolemia     Malignant neoplasm of female breast [facility])     s/p B Mastectomy - Dr. Turcios Cancer 2004    Mild aortic sclerosis ([address] 2019    ECHO 19    Non-thrombocytopenic purpura ([address]     Osteopenia     DXA 10/12/11 Improving densitiy L hip    Skin lesion     4mm lesion L nose- dr. Josette Patten - (benign)    Stress incontinence 10/5/2012    Trace aortic valve regurgitation 2019    ECHO 19    Vertigo      Family History   Problem Relation Age of Onset    High Cholesterol Father          at 80    Elevated Lipids Father     Hypertension Father     Coronary Art Dis Father     Alzheimer's Disease Mother  at 80    Other Mother         Hip FX and Pulm Embolism     Hypertension Mother     Breast Cancer Sister         Dead    Stroke Daughter     Depression Daughter      Social History     Socioeconomic History    Marital status:      Spouse name: Prescilla Meigs Number of children: 2    Years of education: Not on file    Highest education level: Not on file   Occupational History    Occupation: Retired teacher- tutoring at [employer] Financial resource strain: Not on file    Food insecurity:     Worry: Not on file     Inability: Not on file   [employer] needs:     Medical: Not on file     Non-medical: Not on file   Tobacco Use    Smoking status: Never Smoker    Smokeless tobacco: Never Used   Substance and Sexual Activity    Alcohol use: Yes     Alcohol/week: 0.0 oz     Comment: occassional    Drug use: Not on file    Sexual activity: Not on file   Lifestyle    Physical activity:     Days per week: Not on file     Minutes per session: Not on file    Stress: Not on file   Relationships    Social connections:     Talks on phone: Not on file     Gets together: Not on file     Attends Congregational service: Not on file     Active member of club or organization: Not on file     Attends meetings of clubs or organizations: Not on file     Relationship status: Not on file    Intimate partner violence:     Fear of current or ex partner: Not on file     Emotionally abused: Not on file     Physically abused: Not on file     Forced sexual activity: Not on file   Other Topics Concern    Not on file   Social History Narrative    Not on file       New Prescriptions    No medications on file         Meds Prior to visit:  Prior to Visit Medications    Medication Sig Taking?  Authorizing Provider   Cholecalciferol (VITAMIN D3) 2000 units CAPS Take 2,000 Units by mouth daily Yes Historical Provider, MD   metoprolol tartrate (LOPRESSOR) 25 MG tablet TAKE 1 TABLET BY MOUTH TWICE DAILY Yes Jeanne Sheldon Value   11/19/2018 93       ASSESSMENT/PLAN:    1. Essential hypertension, benign  Controlled: Appears stable. We will continue current management and monitor for adverse reaction and disease progression. Follow-up as noted below      2. Hypercholesterolemia  Controlled: Appears stable. We will continue current management and monitor for adverse reaction and disease progression. Follow-up as noted below      3. Depressive disorder  Controlled: Appears stable. We will continue current management and monitor for adverse reaction and disease progression. Follow-up as noted below        RTC 6 months    Scribe attestation:  Dylon Pichardo MA, am scribing for and in the presence of Una العلي MD. Electronically signed by Estela Gupta MA on 5/21/2019 at 8:45 AM            Provider attestation: Eyad Regalado MD, personally performed the services scribed by the user listed above in my presence, and it is both accurate and complete. I agree with the ROS and Past Histories independently gathered by the clinical support staff and the remaining scribed note accurately describes my personal service to the patient.     [facility]    5/21/2019  9:01 AM

## 2019-07-08 RX ORDER — SIMVASTATIN 20 MG
TABLET ORAL
Qty: 90 TABLET | Refills: 3 | Status: SHIPPED | OUTPATIENT
Start: 2019-07-08 | End: 2020-06-29

## 2019-10-21 ENCOUNTER — TELEPHONE (OUTPATIENT)
Dept: PRIMARY CARE CLINIC | Age: 69
End: 2019-10-21

## 2019-10-21 DIAGNOSIS — E78.00 HYPERCHOLESTEROLEMIA: Primary | ICD-10-CM

## 2019-11-04 ENCOUNTER — OFFICE VISIT (OUTPATIENT)
Dept: PRIMARY CARE CLINIC | Age: 69
End: 2019-11-04
Payer: MEDICARE

## 2019-11-04 VITALS
BODY MASS INDEX: 26.36 KG/M2 | WEIGHT: 164 LBS | SYSTOLIC BLOOD PRESSURE: 152 MMHG | HEART RATE: 72 BPM | DIASTOLIC BLOOD PRESSURE: 70 MMHG | HEIGHT: 66 IN

## 2019-11-04 DIAGNOSIS — F32.A DEPRESSIVE DISORDER: Primary | ICD-10-CM

## 2019-11-04 DIAGNOSIS — I10 ESSENTIAL HYPERTENSION, BENIGN: ICD-10-CM

## 2019-11-04 DIAGNOSIS — Z23 NEED FOR VACCINATION: ICD-10-CM

## 2019-11-04 DIAGNOSIS — E78.00 HYPERCHOLESTEROLEMIA: ICD-10-CM

## 2019-11-04 LAB
A/G RATIO: 2 (ref 1.1–2.2)
ALBUMIN SERPL-MCNC: 4.9 G/DL (ref 3.4–5)
ALP BLD-CCNC: 89 U/L (ref 40–129)
ALT SERPL-CCNC: 20 U/L (ref 10–40)
ANION GAP SERPL CALCULATED.3IONS-SCNC: 12 MMOL/L (ref 3–16)
AST SERPL-CCNC: 20 U/L (ref 15–37)
BILIRUB SERPL-MCNC: 0.4 MG/DL (ref 0–1)
BUN BLDV-MCNC: 16 MG/DL (ref 7–20)
CALCIUM SERPL-MCNC: 9.8 MG/DL (ref 8.3–10.6)
CHLORIDE BLD-SCNC: 98 MMOL/L (ref 99–110)
CHOLESTEROL, TOTAL: 237 MG/DL (ref 0–199)
CO2: 30 MMOL/L (ref 21–32)
CREAT SERPL-MCNC: 0.6 MG/DL (ref 0.6–1.2)
GFR AFRICAN AMERICAN: >60
GFR NON-AFRICAN AMERICAN: >60
GLOBULIN: 2.5 G/DL
GLUCOSE BLD-MCNC: 100 MG/DL (ref 70–99)
HDLC SERPL-MCNC: 104 MG/DL (ref 40–60)
LDL CHOLESTEROL CALCULATED: 105 MG/DL
POTASSIUM SERPL-SCNC: 4.2 MMOL/L (ref 3.5–5.1)
SODIUM BLD-SCNC: 140 MMOL/L (ref 136–145)
TOTAL PROTEIN: 7.4 G/DL (ref 6.4–8.2)
TRIGL SERPL-MCNC: 139 MG/DL (ref 0–150)
VLDLC SERPL CALC-MCNC: 28 MG/DL

## 2019-11-04 PROCEDURE — 90653 IIV ADJUVANT VACCINE IM: CPT | Performed by: FAMILY MEDICINE

## 2019-11-04 PROCEDURE — G8482 FLU IMMUNIZE ORDER/ADMIN: HCPCS | Performed by: FAMILY MEDICINE

## 2019-11-04 PROCEDURE — 1123F ACP DISCUSS/DSCN MKR DOCD: CPT | Performed by: FAMILY MEDICINE

## 2019-11-04 PROCEDURE — G8427 DOCREV CUR MEDS BY ELIG CLIN: HCPCS | Performed by: FAMILY MEDICINE

## 2019-11-04 PROCEDURE — 1090F PRES/ABSN URINE INCON ASSESS: CPT | Performed by: FAMILY MEDICINE

## 2019-11-04 PROCEDURE — 3017F COLORECTAL CA SCREEN DOC REV: CPT | Performed by: FAMILY MEDICINE

## 2019-11-04 PROCEDURE — 1036F TOBACCO NON-USER: CPT | Performed by: FAMILY MEDICINE

## 2019-11-04 PROCEDURE — G9708 BILAT MAST/HX BI /UNILAT MAS: HCPCS | Performed by: FAMILY MEDICINE

## 2019-11-04 PROCEDURE — G8417 CALC BMI ABV UP PARAM F/U: HCPCS | Performed by: FAMILY MEDICINE

## 2019-11-04 PROCEDURE — G8399 PT W/DXA RESULTS DOCUMENT: HCPCS | Performed by: FAMILY MEDICINE

## 2019-11-04 PROCEDURE — G0008 ADMIN INFLUENZA VIRUS VAC: HCPCS | Performed by: FAMILY MEDICINE

## 2019-11-04 PROCEDURE — 99214 OFFICE O/P EST MOD 30 MIN: CPT | Performed by: FAMILY MEDICINE

## 2019-11-04 PROCEDURE — 4040F PNEUMOC VAC/ADMIN/RCVD: CPT | Performed by: FAMILY MEDICINE

## 2019-11-04 ASSESSMENT — ENCOUNTER SYMPTOMS
CONSTIPATION: 0
NAUSEA: 0
SHORTNESS OF BREATH: 0
DIARRHEA: 0
VOMITING: 0
ABDOMINAL PAIN: 0
COUGH: 0

## 2019-12-09 ENCOUNTER — OFFICE VISIT (OUTPATIENT)
Dept: PRIMARY CARE CLINIC | Age: 69
End: 2019-12-09
Payer: MEDICARE

## 2019-12-09 VITALS
BODY MASS INDEX: 26.2 KG/M2 | SYSTOLIC BLOOD PRESSURE: 132 MMHG | HEART RATE: 65 BPM | WEIGHT: 163 LBS | HEIGHT: 66 IN | DIASTOLIC BLOOD PRESSURE: 66 MMHG

## 2019-12-09 DIAGNOSIS — F32.5 MAJOR DEPRESSION IN REMISSION (HCC): ICD-10-CM

## 2019-12-09 DIAGNOSIS — I10 ESSENTIAL HYPERTENSION, BENIGN: Primary | ICD-10-CM

## 2019-12-09 PROCEDURE — 1090F PRES/ABSN URINE INCON ASSESS: CPT | Performed by: FAMILY MEDICINE

## 2019-12-09 PROCEDURE — G8417 CALC BMI ABV UP PARAM F/U: HCPCS | Performed by: FAMILY MEDICINE

## 2019-12-09 PROCEDURE — G8427 DOCREV CUR MEDS BY ELIG CLIN: HCPCS | Performed by: FAMILY MEDICINE

## 2019-12-09 PROCEDURE — 4040F PNEUMOC VAC/ADMIN/RCVD: CPT | Performed by: FAMILY MEDICINE

## 2019-12-09 PROCEDURE — 1123F ACP DISCUSS/DSCN MKR DOCD: CPT | Performed by: FAMILY MEDICINE

## 2019-12-09 PROCEDURE — 1036F TOBACCO NON-USER: CPT | Performed by: FAMILY MEDICINE

## 2019-12-09 PROCEDURE — G8399 PT W/DXA RESULTS DOCUMENT: HCPCS | Performed by: FAMILY MEDICINE

## 2019-12-09 PROCEDURE — G8482 FLU IMMUNIZE ORDER/ADMIN: HCPCS | Performed by: FAMILY MEDICINE

## 2019-12-09 PROCEDURE — 3017F COLORECTAL CA SCREEN DOC REV: CPT | Performed by: FAMILY MEDICINE

## 2019-12-09 PROCEDURE — G9708 BILAT MAST/HX BI /UNILAT MAS: HCPCS | Performed by: FAMILY MEDICINE

## 2019-12-09 PROCEDURE — 99213 OFFICE O/P EST LOW 20 MIN: CPT | Performed by: FAMILY MEDICINE

## 2019-12-09 ASSESSMENT — ENCOUNTER SYMPTOMS
COUGH: 0
ABDOMINAL PAIN: 0
CONSTIPATION: 0
VOMITING: 0
SHORTNESS OF BREATH: 0
NAUSEA: 0
DIARRHEA: 0

## 2020-01-22 ENCOUNTER — PATIENT MESSAGE (OUTPATIENT)
Dept: PRIMARY CARE CLINIC | Age: 70
End: 2020-01-22

## 2020-01-22 RX ORDER — OSELTAMIVIR PHOSPHATE 75 MG/1
75 CAPSULE ORAL 2 TIMES DAILY
Qty: 10 CAPSULE | Refills: 0 | Status: SHIPPED | OUTPATIENT
Start: 2020-01-22 | End: 2020-01-27

## 2020-02-21 RX ORDER — AMLODIPINE BESYLATE 5 MG/1
TABLET ORAL
Qty: 90 TABLET | Refills: 3 | Status: SHIPPED | OUTPATIENT
Start: 2020-02-21 | End: 2021-02-22

## 2020-02-21 RX ORDER — VENLAFAXINE HYDROCHLORIDE 75 MG/1
CAPSULE, EXTENDED RELEASE ORAL
Qty: 90 CAPSULE | Refills: 3 | Status: SHIPPED | OUTPATIENT
Start: 2020-02-21 | End: 2021-02-22

## 2020-05-22 ENCOUNTER — TELEPHONE (OUTPATIENT)
Dept: PRIMARY CARE CLINIC | Age: 70
End: 2020-05-22

## 2020-06-08 ENCOUNTER — OFFICE VISIT (OUTPATIENT)
Dept: PRIMARY CARE CLINIC | Age: 70
End: 2020-06-08
Payer: MEDICARE

## 2020-06-08 VITALS
HEIGHT: 66 IN | DIASTOLIC BLOOD PRESSURE: 74 MMHG | WEIGHT: 166 LBS | HEART RATE: 72 BPM | TEMPERATURE: 97.4 F | BODY MASS INDEX: 26.68 KG/M2 | SYSTOLIC BLOOD PRESSURE: 130 MMHG

## 2020-06-08 DIAGNOSIS — R53.83 FATIGUE, UNSPECIFIED TYPE: ICD-10-CM

## 2020-06-08 LAB
BASOPHILS ABSOLUTE: 0 K/UL (ref 0–0.2)
BASOPHILS RELATIVE PERCENT: 0.7 %
EOSINOPHILS ABSOLUTE: 0.2 K/UL (ref 0–0.6)
EOSINOPHILS RELATIVE PERCENT: 3.2 %
HCT VFR BLD CALC: 41.5 % (ref 36–48)
HEMOGLOBIN: 13.7 G/DL (ref 12–16)
LYMPHOCYTES ABSOLUTE: 1.8 K/UL (ref 1–5.1)
LYMPHOCYTES RELATIVE PERCENT: 28.4 %
MCH RBC QN AUTO: 32 PG (ref 26–34)
MCHC RBC AUTO-ENTMCNC: 33.1 G/DL (ref 31–36)
MCV RBC AUTO: 96.8 FL (ref 80–100)
MONOCYTES ABSOLUTE: 0.5 K/UL (ref 0–1.3)
MONOCYTES RELATIVE PERCENT: 7.2 %
NEUTROPHILS ABSOLUTE: 3.9 K/UL (ref 1.7–7.7)
NEUTROPHILS RELATIVE PERCENT: 60.5 %
PDW BLD-RTO: 12.3 % (ref 12.4–15.4)
PLATELET # BLD: 220 K/UL (ref 135–450)
PMV BLD AUTO: 10.4 FL (ref 5–10.5)
RBC # BLD: 4.29 M/UL (ref 4–5.2)
TSH REFLEX: 2.6 UIU/ML (ref 0.27–4.2)
WBC # BLD: 6.4 K/UL (ref 4–11)

## 2020-06-08 PROCEDURE — G9708 BILAT MAST/HX BI /UNILAT MAS: HCPCS | Performed by: FAMILY MEDICINE

## 2020-06-08 PROCEDURE — G8399 PT W/DXA RESULTS DOCUMENT: HCPCS | Performed by: FAMILY MEDICINE

## 2020-06-08 PROCEDURE — 1123F ACP DISCUSS/DSCN MKR DOCD: CPT | Performed by: FAMILY MEDICINE

## 2020-06-08 PROCEDURE — 3017F COLORECTAL CA SCREEN DOC REV: CPT | Performed by: FAMILY MEDICINE

## 2020-06-08 PROCEDURE — 1036F TOBACCO NON-USER: CPT | Performed by: FAMILY MEDICINE

## 2020-06-08 PROCEDURE — 4040F PNEUMOC VAC/ADMIN/RCVD: CPT | Performed by: FAMILY MEDICINE

## 2020-06-08 PROCEDURE — G8427 DOCREV CUR MEDS BY ELIG CLIN: HCPCS | Performed by: FAMILY MEDICINE

## 2020-06-08 PROCEDURE — 1090F PRES/ABSN URINE INCON ASSESS: CPT | Performed by: FAMILY MEDICINE

## 2020-06-08 PROCEDURE — 99214 OFFICE O/P EST MOD 30 MIN: CPT | Performed by: FAMILY MEDICINE

## 2020-06-08 PROCEDURE — G8417 CALC BMI ABV UP PARAM F/U: HCPCS | Performed by: FAMILY MEDICINE

## 2020-06-08 PROCEDURE — G0438 PPPS, INITIAL VISIT: HCPCS | Performed by: FAMILY MEDICINE

## 2020-06-08 ASSESSMENT — ENCOUNTER SYMPTOMS
VOMITING: 0
EYE PAIN: 0
SORE THROAT: 0
COLOR CHANGE: 0
NAUSEA: 0
RHINORRHEA: 0
ABDOMINAL PAIN: 0
SHORTNESS OF BREATH: 0
DIARRHEA: 0
COUGH: 0
CONSTIPATION: 0

## 2020-06-08 ASSESSMENT — LIFESTYLE VARIABLES
HOW OFTEN DO YOU HAVE A DRINK CONTAINING ALCOHOL: 4
HOW OFTEN DURING THE LAST YEAR HAVE YOU NEEDED AN ALCOHOLIC DRINK FIRST THING IN THE MORNING TO GET YOURSELF GOING AFTER A NIGHT OF HEAVY DRINKING: 0
HAS A RELATIVE, FRIEND, DOCTOR, OR ANOTHER HEALTH PROFESSIONAL EXPRESSED CONCERN ABOUT YOUR DRINKING OR SUGGESTED YOU CUT DOWN: 0
HOW OFTEN DURING THE LAST YEAR HAVE YOU HAD A FEELING OF GUILT OR REMORSE AFTER DRINKING: 0
AUDIT TOTAL SCORE: 4
HOW MANY STANDARD DRINKS CONTAINING ALCOHOL DO YOU HAVE ON A TYPICAL DAY: 0
HAVE YOU OR SOMEONE ELSE BEEN INJURED AS A RESULT OF YOUR DRINKING: 0
HOW OFTEN DURING THE LAST YEAR HAVE YOU FOUND THAT YOU WERE NOT ABLE TO STOP DRINKING ONCE YOU HAD STARTED: 0
AUDIT-C TOTAL SCORE: 4
HOW OFTEN DURING THE LAST YEAR HAVE YOU FAILED TO DO WHAT WAS NORMALLY EXPECTED FROM YOU BECAUSE OF DRINKING: 0
HOW OFTEN DO YOU HAVE SIX OR MORE DRINKS ON ONE OCCASION: 0
HOW OFTEN DURING THE LAST YEAR HAVE YOU BEEN UNABLE TO REMEMBER WHAT HAPPENED THE NIGHT BEFORE BECAUSE YOU HAD BEEN DRINKING: 0

## 2020-06-08 ASSESSMENT — PATIENT HEALTH QUESTIONNAIRE - PHQ9
SUM OF ALL RESPONSES TO PHQ QUESTIONS 1-9: 0
SUM OF ALL RESPONSES TO PHQ QUESTIONS 1-9: 0

## 2020-06-08 NOTE — PROGRESS NOTES
Sister         Dead    Stroke Daughter     Depression Daughter        CareTeam (Including outside providers/suppliers regularly involved in providing care):   Patient Care Team:  Layla Martell MD as PCP - General (Family Medicine)  Luis Eduardo Washburn MD as PCP - Hematology/Oncology (Hematology and Oncology)  Layla Martell MD as PCP - [facility] Empaneled Provider  Adrian Iniguez MD as Surgeon (Plastic Surgery)    Wt Readings from Last 3 Encounters:   06/08/20 166 lb (75.3 kg)   12/09/19 163 lb (73.9 kg)   11/04/19 164 lb (74.4 kg)     Vitals:    06/08/20 0826   BP: 130/74   Pulse: 72   Temp: 97.4 °F (36.3 °C)   Weight: 166 lb (75.3 kg)   Height: 5' 6\" (1.676 m)     Body mass index is 26.79 kg/m². Based upon direct observation of the patient, evaluation of cognition reveals recent and remote memory intact. Patient's complete Health Risk Assessment and screening values have been reviewed and are found in Flowsheets. The following problems were reviewed today and where indicated follow up appointments were made and/or referrals ordered. Positive Risk Factor Screenings with Interventions:     General Health:  General  In general, how would you say your health is?: Very Good  In the past 7 days, have you experienced any of the following? New or Increased Pain, New or Increased Fatigue, Loneliness, Social Isolation, Stress or Anger?: None of These  Do you get the social and emotional support that you need?: Yes  Do you have a Living Will?: (!) No  General Health Risk Interventions:  · Well with exception of fatigue.   Investigating as above    Hearing/Vision:  No exam data present  Hearing/Vision  Do you or your family notice any trouble with your hearing?: (!) Yes  Do you have difficulty driving, watching TV, or doing any of your daily activities because of your eyesight?: No  Have you had an eye exam within the past year?: (!) No  Hearing/Vision Interventions:  · Patient has no concerns but is encouraged to set up follow-up screening evaluations with her audiologist and an optometrist    Personalized Preventive Plan   Current Health Maintenance Status  Immunization History   Administered Date(s) Administered    Influenza 10/05/2012, 10/17/2013, 09/22/2015    Influenza Virus Vaccine 09/21/2011, 10/09/2014    Influenza Whole 10/26/2010    Influenza, Intradermal, Quadrivalent, Preservative Free 12/07/2017    Influenza, Quadv, IM, PF (6 mo and older Fluzone, Flulaval, Fluarix, and 3 yrs and older Afluria) 11/18/2016    Influenza, Quadv, Recombinant, IM PF (Flublok 18 yrs and older) 11/19/2018    Influenza, Triv, inactivated, subunit, adjuvanted, IM (Fluad 65 yrs and older) 11/04/2019    Pneumococcal Conjugate 13-valent ([id number]) 05/26/2017    Pneumococcal Polysaccharide ([id number]) 06/07/2018    Tdap (Boostrix, Adacel) 09/21/2011        Health Maintenance   Topic Date Due    Shingles Vaccine (1 of 2) 12/31/2001    Annual Wellness Visit (AWV)  05/29/2019    Lipid screen  11/04/2020    Potassium monitoring  11/04/2020    Creatinine monitoring  11/04/2020    DTaP/Tdap/Td vaccine (2 - Td) 09/21/2021    Colon cancer screen colonoscopy  11/09/2026    DEXA (modify frequency per FRAX score)  Completed    Flu vaccine  Completed    Pneumococcal 65+ years Vaccine  Completed    Hepatitis C screen  Completed    Hepatitis A vaccine  Aged Out    Hepatitis B vaccine  Aged Out    Hib vaccine  Aged Out    Meningococcal (ACWY) vaccine  Aged Out     Recommendations for [place] Due: see orders and patient instructions/AVS.  . Recommended screening schedule for the next 5-10 years is provided to the patient in written form: see Patient Instructions/AVS.    Brant Bello was seen today for medicare awv, hypertension, depression and hyperlipidemia.     Diagnoses and all orders for this visit:    Medicare annual wellness visit, initial  As above  Essential hypertension  As above  Depressive disorder  As

## 2020-06-29 RX ORDER — SIMVASTATIN 20 MG
TABLET ORAL
Qty: 90 TABLET | Refills: 2 | Status: SHIPPED | OUTPATIENT
Start: 2020-06-29 | End: 2021-03-28

## 2020-11-12 PROBLEM — F32.5 MAJOR DEPRESSION IN REMISSION (HCC): Status: ACTIVE | Noted: 2020-11-12

## 2020-11-16 ENCOUNTER — OFFICE VISIT (OUTPATIENT)
Dept: PRIMARY CARE CLINIC | Age: 70
End: 2020-11-16
Payer: MEDICARE

## 2020-11-16 VITALS
TEMPERATURE: 97.6 F | SYSTOLIC BLOOD PRESSURE: 131 MMHG | DIASTOLIC BLOOD PRESSURE: 70 MMHG | WEIGHT: 167.6 LBS | BODY MASS INDEX: 27.05 KG/M2 | HEART RATE: 72 BPM

## 2020-11-16 DIAGNOSIS — E78.00 HYPERCHOLESTEROLEMIA: ICD-10-CM

## 2020-11-16 DIAGNOSIS — I10 ESSENTIAL HYPERTENSION: ICD-10-CM

## 2020-11-16 LAB
A/G RATIO: 1.6 (ref 1.1–2.2)
ALBUMIN SERPL-MCNC: 4.4 G/DL (ref 3.4–5)
ALP BLD-CCNC: 94 U/L (ref 40–129)
ALT SERPL-CCNC: 23 U/L (ref 10–40)
ANION GAP SERPL CALCULATED.3IONS-SCNC: 7 MMOL/L (ref 3–16)
AST SERPL-CCNC: 24 U/L (ref 15–37)
BILIRUB SERPL-MCNC: 0.3 MG/DL (ref 0–1)
BUN BLDV-MCNC: 19 MG/DL (ref 7–20)
CALCIUM SERPL-MCNC: 9.9 MG/DL (ref 8.3–10.6)
CHLORIDE BLD-SCNC: 102 MMOL/L (ref 99–110)
CHOLESTEROL, TOTAL: 216 MG/DL (ref 0–199)
CO2: 33 MMOL/L (ref 21–32)
CREAT SERPL-MCNC: 0.6 MG/DL (ref 0.6–1.2)
GFR AFRICAN AMERICAN: >60
GFR NON-AFRICAN AMERICAN: >60
GLOBULIN: 2.7 G/DL
GLUCOSE BLD-MCNC: 107 MG/DL (ref 70–99)
HDLC SERPL-MCNC: 76 MG/DL (ref 40–60)
LDL CHOLESTEROL CALCULATED: 116 MG/DL
POTASSIUM SERPL-SCNC: 4.3 MMOL/L (ref 3.5–5.1)
SODIUM BLD-SCNC: 142 MMOL/L (ref 136–145)
TOTAL PROTEIN: 7.1 G/DL (ref 6.4–8.2)
TRIGL SERPL-MCNC: 121 MG/DL (ref 0–150)
VLDLC SERPL CALC-MCNC: 24 MG/DL

## 2020-11-16 PROCEDURE — G0008 ADMIN INFLUENZA VIRUS VAC: HCPCS | Performed by: FAMILY MEDICINE

## 2020-11-16 PROCEDURE — G8399 PT W/DXA RESULTS DOCUMENT: HCPCS | Performed by: FAMILY MEDICINE

## 2020-11-16 PROCEDURE — 4040F PNEUMOC VAC/ADMIN/RCVD: CPT | Performed by: FAMILY MEDICINE

## 2020-11-16 PROCEDURE — G8427 DOCREV CUR MEDS BY ELIG CLIN: HCPCS | Performed by: FAMILY MEDICINE

## 2020-11-16 PROCEDURE — 99214 OFFICE O/P EST MOD 30 MIN: CPT | Performed by: FAMILY MEDICINE

## 2020-11-16 PROCEDURE — 90694 VACC AIIV4 NO PRSRV 0.5ML IM: CPT | Performed by: FAMILY MEDICINE

## 2020-11-16 PROCEDURE — 1123F ACP DISCUSS/DSCN MKR DOCD: CPT | Performed by: FAMILY MEDICINE

## 2020-11-16 PROCEDURE — 3017F COLORECTAL CA SCREEN DOC REV: CPT | Performed by: FAMILY MEDICINE

## 2020-11-16 PROCEDURE — G8417 CALC BMI ABV UP PARAM F/U: HCPCS | Performed by: FAMILY MEDICINE

## 2020-11-16 PROCEDURE — G8484 FLU IMMUNIZE NO ADMIN: HCPCS | Performed by: FAMILY MEDICINE

## 2020-11-16 PROCEDURE — 1036F TOBACCO NON-USER: CPT | Performed by: FAMILY MEDICINE

## 2020-11-16 PROCEDURE — G9708 BILAT MAST/HX BI /UNILAT MAS: HCPCS | Performed by: FAMILY MEDICINE

## 2020-11-16 PROCEDURE — 1090F PRES/ABSN URINE INCON ASSESS: CPT | Performed by: FAMILY MEDICINE

## 2020-11-16 ASSESSMENT — PATIENT HEALTH QUESTIONNAIRE - PHQ9
1. LITTLE INTEREST OR PLEASURE IN DOING THINGS: 0
8. MOVING OR SPEAKING SO SLOWLY THAT OTHER PEOPLE COULD HAVE NOTICED. OR THE OPPOSITE, BEING SO FIGETY OR RESTLESS THAT YOU HAVE BEEN MOVING AROUND A LOT MORE THAN USUAL: 0
9. THOUGHTS THAT YOU WOULD BE BETTER OFF DEAD, OR OF HURTING YOURSELF: 0
4. FEELING TIRED OR HAVING LITTLE ENERGY: 1
2. FEELING DOWN, DEPRESSED OR HOPELESS: 1
SUM OF ALL RESPONSES TO PHQ QUESTIONS 1-9: 2
7. TROUBLE CONCENTRATING ON THINGS, SUCH AS READING THE NEWSPAPER OR WATCHING TELEVISION: 0
SUM OF ALL RESPONSES TO PHQ9 QUESTIONS 1 & 2: 1
SUM OF ALL RESPONSES TO PHQ QUESTIONS 1-9: 2
5. POOR APPETITE OR OVEREATING: 0
SUM OF ALL RESPONSES TO PHQ QUESTIONS 1-9: 2
3. TROUBLE FALLING OR STAYING ASLEEP: 0
6. FEELING BAD ABOUT YOURSELF - OR THAT YOU ARE A FAILURE OR HAVE LET YOURSELF OR YOUR FAMILY DOWN: 0
10. IF YOU CHECKED OFF ANY PROBLEMS, HOW DIFFICULT HAVE THESE PROBLEMS MADE IT FOR YOU TO DO YOUR WORK, TAKE CARE OF THINGS AT HOME, OR GET ALONG WITH OTHER PEOPLE: 1

## 2020-11-16 ASSESSMENT — ENCOUNTER SYMPTOMS
NAUSEA: 0
CONSTIPATION: 0
VOMITING: 0
SHORTNESS OF BREATH: 0
DIARRHEA: 0
ABDOMINAL PAIN: 0
COUGH: 0

## 2020-11-16 NOTE — PROGRESS NOTES
Vaccine Information Sheet, \"Influenza - Inactivated\"  given to Jose[place] Franklyn, or parent/legal guardian of  Jose[place]ina Franklyn and verbalized understanding. Patient responses:    Have you ever had a reaction to a flu vaccine? No  Do you have any current illness? No  Have you ever had Guillian Jay Syndrome? No  Do you have a serious allergy to any of the follow: Neomycin, Polymyxin, Thimerosal, eggs or egg products? No    Flu vaccine given per order. Please see immunization tab. Risks and benefits explained. Current VIS given.

## 2020-11-16 NOTE — PATIENT INSTRUCTIONS
your affected leg on top and your head propped on a pillow. Keep your feet and knees together and your knees bent. 2. Raise your top knee, but keep your feet together. Do not let your hips roll back. Your legs should open up like a clamshell. 3. Hold for 6 seconds. 4. Slowly lower your knee back down. Rest for 10 seconds. 5. Repeat 8 to 12 times. Standing quadriceps stretch   1. If you are not steady on your feet, hold on to a chair, counter, or wall. You can also lie on your stomach or your side to do this exercise. 2. Bend the knee of the leg you want to stretch, and reach behind you to grab the front of your foot or ankle with the hand on the same side. For example, if you are stretching your right leg, use your right hand. 3. Keeping your knees next to each other, pull your foot toward your buttock until you feel a gentle stretch across the front of your hip and down the front of your thigh. Your knee should be pointed directly to the ground, and not out to the side. 4. Hold the stretch for 15 to 30 seconds. 5. Repeat 2 to 4 times. Piriformis stretch   1. Lie on your back with your legs straight. 2. Lift your affected leg and bend your knee. With your opposite hand, reach across your body, and then gently pull your knee toward your opposite shoulder. 3. Hold the stretch for 15 to 30 seconds. 4. Repeat 2 to 4 times. Double knee-to-chest   1. Lie on your back with your knees bent and your feet flat on the floor. You can put a small pillow under your head and neck if it is more comfortable. 2. Bring both knees to your chest.  3. Keep your lower back pressed to the floor. Hold for 15 to 30 seconds. 4. Relax, and lower your knees to the starting position. 5. Repeat 2 to 4 times. Follow-up care is a key part of your treatment and safety. Be sure to make and go to all appointments, and call your doctor if you are having problems.  It's also a good idea to know your test results and keep a list of the medicines you take. Where can you learn more? Go to [email or web address] and sign in to your [employer] account. Enter E656 in the [facility] box to learn more about \"Trochanteric Bursitis: Exercises. \"     If you do not have an account, please click on the \"Sign Up Now\" link. Current as of: March 2, 2020               Content Version: 12.6  © 2006-2020 [employer]. Care instructions adapted under license by [facility] ([facility]). If you have questions about a medical condition or this instruction, always ask your healthcare professional. [address] any warranty or liability for your use of this information.

## 2020-11-16 NOTE — PROGRESS NOTES
Chief Complaint   Patient presents with    Depression    Hypertension    Hyperlipidemia       HPI: Meet Boyd presents for evaluation and management of multiple medical problems. She reports that she is struggling a little bit with social isolation associated with stay at home order from the pandemic. She had started drinking more wine but when her  mentioned it to her she cut back. She notes decreased energy and motivation for doing routine housecleaning activities. PHQ-9 Total Score: 2 (11/16/2020  9:17 AM)  Thoughts that you would be better off dead, or of hurting yourself in some way: 0 (11/16/2020  9:17 AM)      She reports she is taking tolerating her blood pressure medication without lightheadedness or dizziness. She is taking and tolerating her cholesterol medicine without abdominal pain or myalgia. She notes a 1 month history of lateral left leg pain radiating from her hip down to her ankle. She states that is mild but sometimes it wakes her up from sleep. She notes no weakness or numbness associated with this      Review of Systems   Constitutional: Negative for chills and fever. Respiratory: Negative for cough and shortness of breath. Cardiovascular: Negative for chest pain and palpitations. Gastrointestinal: Negative for abdominal pain, constipation, diarrhea, nausea and vomiting. Endocrine: Negative for polyuria. Genitourinary: Negative for dysuria.        No Known Allergies  New Prescriptions    No medications on file     Current Outpatient Medications   Medication Sig Dispense Refill    simvastatin (ZOCOR) 20 MG tablet TAKE ONE TABLET BY MOUTH DAILY 90 tablet 2    metoprolol tartrate (LOPRESSOR) 25 MG tablet TAKE ONE TABLET BY MOUTH TWICE A  tablet 3    venlafaxine (EFFEXOR XR) 75 MG extended release capsule TAKE ONE CAPSULE BY MOUTH DAILY 90 capsule 3    amLODIPine (NORVASC) 5 MG tablet TAKE ONE TABLET BY MOUTH DAILY 90 tablet 3    Cholecalciferol (VITAMIN D3) 2000 units CAPS Take 2,000 Units by mouth daily      CRANBERRY CONCENTRATE PO Take by mouth      azelastine (ASTELIN) 0.1 % nasal spray 2 sprays by Nasal route 2 times daily Use in each nostril as directed 1 Bottle 3    Probiotic Product (PROBIOTIC DAILY PO) Take by mouth      fluticasone (FLONASE) 50 MCG/ACT nasal spray INSTILL 1 SPRAY INTO THE NOSTRILS DAILY 1 Bottle 0    Omega-3 Fatty Acids (FISH OIL PO) Take  by mouth.  Multiple Vitamins-Minerals (THERAPEUTIC MULTIVITAMIN-MINERALS) tablet Take 1 tablet by mouth daily.  Ascorbic Acid (VITAMIN C) 500 MG tablet Take 500 mg by mouth daily. No current facility-administered medications for this visit. Past Medical History:   Diagnosis Date    Allergic rhinitis     Basal cell cancer May 2014    Forehead: Dr. Connie Franz BRCA positive     Depressive disorder     Hearing loss     HTN (hypertension)     Hx of bilateral mastectomy 7/27/2015    Hypercholesterolemia     Malignant neoplasm of female breast [facility])     s/p B Mastectomy - Dr. Moe Rodrigez 2004    Mild aortic sclerosis 01/17/2019    ECHO 01/17/19    Non-thrombocytopenic purpura ([address]     Osteopenia     DXA 10/12/11 Improving densitiy L hip    Skin lesion     4mm lesion L nose- dr. Salvadore Babinski - (benign)    Stress incontinence 10/5/2012    Trace aortic valve regurgitation 01/17/2019    ECHO 01/17/19    Vertigo          Objective   /70   Pulse 72   Temp 97.6 °F (36.4 °C) (Temporal)   Wt 167 lb 9.6 oz (76 kg)   Breastfeeding No   BMI 27.05 kg/m²   Wt Readings from Last 3 Encounters:   11/16/20 167 lb 9.6 oz (76 kg)   06/08/20 166 lb (75.3 kg)   12/09/19 163 lb (73.9 kg)       Physical Exam  Constitutional:       Appearance: She is well-developed. Cardiovascular:      Rate and Rhythm: Normal rate and regular rhythm. Heart sounds: No murmur. No friction rub. No gallop.     Pulmonary:      Effort: Pulmonary effort is normal.      Breath sounds: Normal breath sounds. No wheezing or rales. Abdominal:      General: Bowel sounds are normal. There is no distension. Palpations: Abdomen is soft. There is no mass. Tenderness: There is no abdominal tenderness. Musculoskeletal:      Comments: Mild tenderness over left greater trochanter. Straight leg raise is negative bilaterally. Deep tendon reflexes are 2+ and symmetric bilateral lower extremity. Motor strength is 5 out of 5 bilateral lower extremity   Skin:     General: Skin is warm and dry. Findings: No rash. Chemistry        Component Value Date/Time     11/04/2019 0932    K 4.2 11/04/2019 0932    CL 98 (L) 11/04/2019 0932    CO2 30 11/04/2019 0932    BUN 16 11/04/2019 0932    CREATININE 0.6 11/04/2019 0932        Component Value Date/Time    CALCIUM 9.8 11/04/2019 0932    ALKPHOS 89 11/04/2019 0932    AST 20 11/04/2019 0932    ALT 20 11/04/2019 0932    BILITOT 0.4 11/04/2019 0932          Lab Results   Component Value Date    WBC 6.4 06/08/2020    HGB 13.7 06/08/2020    HCT 41.5 06/08/2020    MCV 96.8 06/08/2020     06/08/2020     No results found for: LABA1C  No results found for: EAG  No results found for: LABA1C  No components found for: CHLPL  Lab Results   Component Value Date    TRIG 139 11/04/2019    TRIG 147 11/19/2018    TRIG 121 12/07/2017     Lab Results   Component Value Date     (H) 11/04/2019    HDL 71 (H) 11/19/2018     (H) 12/07/2017     Lab Results   Component Value Date    LDLCALC 105 (H) 11/04/2019    LDLCALC 93 11/19/2018    LDLCALC 88 12/07/2017     Lab Results   Component Value Date    LABVLDL 28 11/04/2019    LABVLDL 29 11/19/2018    LABVLDL 24 12/07/2017         Assessment   Plan     1. Major depression in remission ([address]  Controlled: Appears stable. We will continue current management and monitor for adverse reaction and disease progression. Follow-up as noted below  Counseled patient to work on social activities    2.  Essential

## 2020-12-28 ENCOUNTER — OFFICE VISIT (OUTPATIENT)
Dept: PRIMARY CARE CLINIC | Age: 70
End: 2020-12-28
Payer: MEDICARE

## 2020-12-28 VITALS
SYSTOLIC BLOOD PRESSURE: 136 MMHG | WEIGHT: 170 LBS | BODY MASS INDEX: 27.44 KG/M2 | HEART RATE: 68 BPM | TEMPERATURE: 96.4 F | DIASTOLIC BLOOD PRESSURE: 66 MMHG

## 2020-12-28 PROCEDURE — G8399 PT W/DXA RESULTS DOCUMENT: HCPCS | Performed by: FAMILY MEDICINE

## 2020-12-28 PROCEDURE — 1090F PRES/ABSN URINE INCON ASSESS: CPT | Performed by: FAMILY MEDICINE

## 2020-12-28 PROCEDURE — 1036F TOBACCO NON-USER: CPT | Performed by: FAMILY MEDICINE

## 2020-12-28 PROCEDURE — G8417 CALC BMI ABV UP PARAM F/U: HCPCS | Performed by: FAMILY MEDICINE

## 2020-12-28 PROCEDURE — G9708 BILAT MAST/HX BI /UNILAT MAS: HCPCS | Performed by: FAMILY MEDICINE

## 2020-12-28 PROCEDURE — 4040F PNEUMOC VAC/ADMIN/RCVD: CPT | Performed by: FAMILY MEDICINE

## 2020-12-28 PROCEDURE — 99213 OFFICE O/P EST LOW 20 MIN: CPT | Performed by: FAMILY MEDICINE

## 2020-12-28 PROCEDURE — G8484 FLU IMMUNIZE NO ADMIN: HCPCS | Performed by: FAMILY MEDICINE

## 2020-12-28 PROCEDURE — 1123F ACP DISCUSS/DSCN MKR DOCD: CPT | Performed by: FAMILY MEDICINE

## 2020-12-28 PROCEDURE — 3017F COLORECTAL CA SCREEN DOC REV: CPT | Performed by: FAMILY MEDICINE

## 2020-12-28 PROCEDURE — G8427 DOCREV CUR MEDS BY ELIG CLIN: HCPCS | Performed by: FAMILY MEDICINE

## 2020-12-28 ASSESSMENT — ENCOUNTER SYMPTOMS
SHORTNESS OF BREATH: 0
COUGH: 0
NAUSEA: 0
ABDOMINAL PAIN: 0
DIARRHEA: 0
VOMITING: 0
CHOKING: 0
CONSTIPATION: 0

## 2020-12-28 NOTE — PROGRESS NOTES
Chief Complaint   Patient presents with    Hyperlipidemia    Hypertension    Depression    Other     left leg pain       HPI: Lucho Colon presents for evaluation and management of hip pain. She notes that after about 2 weeks of exercises her hip pain improved dramatically. She is now doing the exercises infrequently as she no longer has hip pain. She doesn't check her BP but is compliant with her chronic meds. Review of Systems   Constitutional: Negative for chills and fever. Respiratory: Negative for cough, choking and shortness of breath. Cardiovascular: Negative for chest pain and palpitations. Gastrointestinal: Negative for abdominal pain, constipation, diarrhea, nausea and vomiting. Endocrine: Negative for polyuria. Genitourinary: Negative for dysuria. Musculoskeletal: Negative for myalgias. No Known Allergies  New Prescriptions    No medications on file     Current Outpatient Medications   Medication Sig Dispense Refill    simvastatin (ZOCOR) 20 MG tablet TAKE ONE TABLET BY MOUTH DAILY 90 tablet 2    metoprolol tartrate (LOPRESSOR) 25 MG tablet TAKE ONE TABLET BY MOUTH TWICE A  tablet 3    venlafaxine (EFFEXOR XR) 75 MG extended release capsule TAKE ONE CAPSULE BY MOUTH DAILY 90 capsule 3    amLODIPine (NORVASC) 5 MG tablet TAKE ONE TABLET BY MOUTH DAILY 90 tablet 3    Cholecalciferol (VITAMIN D3) 2000 units CAPS Take 2,000 Units by mouth daily      CRANBERRY CONCENTRATE PO Take by mouth      azelastine (ASTELIN) 0.1 % nasal spray 2 sprays by Nasal route 2 times daily Use in each nostril as directed 1 Bottle 3    Probiotic Product (PROBIOTIC DAILY PO) Take by mouth      fluticasone (FLONASE) 50 MCG/ACT nasal spray INSTILL 1 SPRAY INTO THE NOSTRILS DAILY 1 Bottle 0    Omega-3 Fatty Acids (FISH OIL PO) Take  by mouth.  Multiple Vitamins-Minerals (THERAPEUTIC MULTIVITAMIN-MINERALS) tablet Take 1 tablet by mouth daily. Progress Note - Louise Arvizu 1963, 55 y o  female MRN: [id number]    [facility] Encounter: [id number]    Primary Care Provider: Joe Westbrook MD   Date and time admitted to hospital: 8/14/2019  2:40 PM        * Acute renal failure superimposed on stage 3 chronic kidney disease ([facility] [address] )  Assessment & Plan  Worsening creatinine with Hematuria and pyuria  Lasix dose was increased 1 week ago from 20 mg to 60 mg daily  Started on  new chemotherapy Lenalidomide  6 days ago  Decreased oral intake noted by patient  Hold Lasix  Start IV fluid for hydration  Hold lenalidomide for now  Consult Nephrology for further management  Follow on renal ultrasound    8/15-ultrasound completed on transplant kidney, concerning for ATN versus possible rejection  Prograf levels are within normal limits, however this medication is a common cause of ATN  Bk virus labs and progress  EDUARDO improved overnight, serum creatinine now 2 36 down from 2 63  BUN creatinine ratio consistent with prerenal etiology  Will continue gentle IV hydration at this time and monitor  Nephrology consulted and appreciate their recommendations  8/16-patient continued on IV fluids overnight, have transitioned to Cipro from Rocephin for treatment of E coli UTI  Nephrology following, recommend continued IVFs, newly elevated creatinine may represent new baseline  Skin rash  Assessment & Plan  Pleuritic Maculopapular rash mostly on bilateral thigh and groin area  Started yesterday  Rule out secondary to drug rash  Hold lenalidomide  Change prednisone to IV Solu-Medrol  Continue supportive care    8/15-patient reports rash improved dramatically overnight, she attributes this to the IV Solu-Medrol and Benadryl  Revlimid stopped and will defer to Primary Oncology as outpatient to find alternative treatments or dose reduction  Discussed with Oncology here who states that rash following Revlimid administration is quite common    Monitoring continue present therapy  Will observe overnight and consider Dermatology consultation in the morning if not showing continued improvement  8/16-patient with continued Revlimid cessation and IV Solu-Medrol overnight, however rash continues to spread  States not as puritic given treatment with Benadryl, but affected areas now larger  Will discuss with Dermatology  Major depressive disorder, recurrent episode, in full remission [facility])  Assessment & Plan  Continue home meds  Patient denies acute issues, denies SI or HI    Renal transplant recipient  Assessment & Plan  Patient is status post kidney and pancreatic transplant in 1998  Continue Prograf and steroids  Nephrology consulted for further management, appreciate their recommendations    Abnormal urinalysis  Assessment & Plan  Patient reported urinary frequency over the past couple days associated with decreasing oral intake  No dysuria  No fever or chills  Rule out UTI  Start IV antibiotic empirically  Follow on urine cultures    8/15-patient reports dysuria, empiric antibiotics started yesterday  Will follow urine cultures and adjust antibiotic therapy as needed  Vital signs stable, white count within normal limits  8/16-patient states that symptoms of dysuria have resolved, transition from ceftriaxone to Cipro for completion of UTI treatment    Multiple myeloma not having achieved remission [facility])  Assessment & Plan  Hold lenalidomide for now  Monitor calcium level  Outpatient Hematology follow-up    8/14-discussed patient briefly with Oncology, stated Revlimid is common cause of rash, we will continue to hold medication at this time  Will defer to outpatient oncology for alternative medications or dose reduction  Essential hypertension  Assessment & Plan  Monitor blood pressure  Continue Lopressor, hydralazine, Cardura, clonidine and amlodipine  8/16-patient's blood pressure continues to be elevated, consider increasing hydralazine  P r n   Ascorbic Acid (VITAMIN C) 500 MG tablet Take 500 mg by mouth daily. No current facility-administered medications for this visit. Past Medical History:   Diagnosis Date    Allergic rhinitis     Basal cell cancer May 2014    Forehead: Dr. Dea Moritz BRCA positive     Depressive disorder     Hearing loss     HTN (hypertension)     Hx of bilateral mastectomy 7/27/2015    Hypercholesterolemia     Malignant neoplasm of female breast [facility])     s/p B Mastectomy - Dr. Dennis Gruber 2004    Mild aortic sclerosis 01/17/2019    ECHO 01/17/19    Non-thrombocytopenic purpura ([address]     Osteopenia     DXA 10/12/11 Improving densitiy L hip    Skin lesion     4mm lesion L nose- dr. Tanna Malcolm - (benign)    Stress incontinence 10/5/2012    Trace aortic valve regurgitation 01/17/2019    ECHO 01/17/19    Vertigo          Objective   BP (!) 146/64   Pulse 68   Temp 96.4 °F (35.8 °C) (Temporal)   Wt 170 lb (77.1 kg)   Breastfeeding No   BMI 27.44 kg/m²   Wt Readings from Last 3 Encounters:   12/28/20 170 lb (77.1 kg)   11/16/20 167 lb 9.6 oz (76 kg)   06/08/20 166 lb (75.3 kg)       Physical Exam  Constitutional:       Appearance: She is well-developed. Cardiovascular:      Rate and Rhythm: Normal rate and regular rhythm. Pulses:           Dorsalis pedis pulses are 2+ on the right side and 2+ on the left side. Posterior tibial pulses are 2+ on the right side and 2+ on the left side. Heart sounds: No murmur. No friction rub. No gallop. Comments: No Lower Extremity Edema  Pulmonary:      Effort: Pulmonary effort is normal.      Breath sounds: Normal breath sounds. No wheezing or rales. Abdominal:      General: Bowel sounds are normal. There is no distension. Palpations: Abdomen is soft. There is no mass. Tenderness: There is no abdominal tenderness. Musculoskeletal:         General: No tenderness. Skin:     General: Skin is warm and dry. Findings: No rash. Chemistry        Component Value Date/Time     11/16/2020 1052    K 4.3 11/16/2020 1052     11/16/2020 1052    CO2 33 (H) 11/16/2020 1052    BUN 19 11/16/2020 1052    CREATININE 0.6 11/16/2020 1052        Component Value Date/Time    CALCIUM 9.9 11/16/2020 1052    ALKPHOS 94 11/16/2020 1052    AST 24 11/16/2020 1052    ALT 23 11/16/2020 1052    BILITOT 0.3 11/16/2020 1052          Lab Results   Component Value Date    WBC 6.4 06/08/2020    HGB 13.7 06/08/2020    HCT 41.5 06/08/2020    MCV 96.8 06/08/2020     06/08/2020     No results found for: LABA1C  No results found for: EAG  No results found for: LABA1C  No components found for: CHLPL  Lab Results   Component Value Date    TRIG 121 11/16/2020    TRIG 139 11/04/2019    TRIG 147 11/19/2018     Lab Results   Component Value Date    HDL 76 (H) 11/16/2020     (H) 11/04/2019    HDL 71 (H) 11/19/2018     Lab Results   Component Value Date    LDLCALC 116 (H) 11/16/2020    LDLCALC 105 (H) 11/04/2019    LDLCALC 93 11/19/2018     Lab Results   Component Value Date    LABVLDL 24 11/16/2020    LABVLDL 28 11/04/2019    LABVLDL 29 11/19/2018         Assessment   Plan     1. Left leg pain  Sig improved: continue HEP 3 x a week and f/u 5 months on chronic med problesm     Discussed use, benefit, and side effects of prescribed medications. Barriers to medication compliance addressed. All patient questions answered. Pt voiced understanding.        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 ([facility] [address] )  Assessment & Plan  - pt with last A1C = 5 1    - home meds include   - Insulin - Long-acting, toujeo 1 U qhs       - Short-acting 10 U preprandial    - hold home oral medications while inpatient  - start Low-CSI  - BG check qc/qhs with goal -180        VTE Pharmacologic Prophylaxis:   Pharmacologic: Heparin  Mechanical VTE Prophylaxis in Place: No    Patient Centered Rounds: I have performed bedside rounds with nursing staff today  Discussions with Specialists or Other Care Team Provider: dermatology, nephrology    Education and Discussions with Family / Patient: Discussed treatment plan with family and patient who agree with current plan; encouraged to ask questions and participate  Time Spent for Care: 45 minutes  More than 50% of total time spent on counseling and coordination of care as described above  Current Length of Stay: 2 day(s)    Current Patient Status: Inpatient   Certification Statement: The patient will continue to require additional inpatient hospital stay due to Treatment of EDUARDO and rash    Discharge Plan: TBD    Code Status: Level 1 - Full Code      Subjective:   Patient seen and examined bedside, no acute distress or discomfort noted  No acute events overnight  Serum creatinine today is now 2 4; slightly elevated over yesterday  Will continue gentle hydration and observation; this serum creatinine may represent a baseline for patient  Transition to Cipro for completion of UTI treatment, will discuss with Dermatology regarding rash  Objective:     Vitals:   Temp (24hrs), Av 1 °F (36 7 °C), Min:97 9 °F (36 6 °C), Max:98 4 °F (36 9 °C)    Temp:  [97 9 °F (36 6 °C)-98 4 °F (36 9 °C)] 98 1 °F (36 7 °C)  HR:  [69-81] 70  Resp:  [20] 20  BP: (169-170)/(62-64) 169/64  SpO2:  [95 %-98 %] 96 %  Body mass index is 34 19 kg/m²       Input and Output Summary (last 24 hours): Intake/Output Summary (Last 24 hours) at 8/16/2019 1158  Last data filed at 8/16/2019 1112  Gross per 24 hour   Intake 3836 66 ml   Output 2775 ml   Net 1061 66 ml       Physical Exam:     Physical Exam  Constitutional: She is oriented to person, place, and time  She appears well-developed and well-nourished  No distress  HENT:   Head: Normocephalic and atraumatic  Nose: Nose normal    Mouth/Throat: Oropharynx is clear and moist  No oropharyngeal exudate  Eyes: Pupils are equal, round, and reactive to light  Conjunctivae and EOM are normal  Right eye exhibits no discharge  Left eye exhibits no discharge  No scleral icterus  Neck: Normal range of motion  No JVD present  No tracheal deviation present  Cardiovascular: Normal rate, regular rhythm and normal heart sounds  Exam reveals no gallop and no friction rub  No murmur heard  Pulmonary/Chest: Effort normal and breath sounds normal  No stridor  No respiratory distress  She has no wheezes  She has no rales  Abdominal: Soft  She exhibits no distension and no mass  There is no tenderness  There is no rebound and no guarding  Musculoskeletal: Normal range of motion  She exhibits deformity (partial left foot amputation)  She exhibits no edema or tenderness  Neurological: She is alert and oriented to person, place, and time  Skin: Skin is warm and dry  Rash (maculopapular rash on bilater anterior thighs and medial arms) noted  She is not diaphoretic  Psychiatric: She has a normal mood and affect   Her behavior is normal  Judgment normal    Additional Data:     Labs:    Results from last 7 days   Lab Units 08/16/19  0441   WBC Thousand/uL 8 61   HEMOGLOBIN g/dL 9 1*   HEMATOCRIT % 29 5*   PLATELETS Thousands/uL 193   NEUTROS PCT % 89*   LYMPHS PCT % 8*   MONOS PCT % 1*   EOS PCT % 1     Results from last 7 days   Lab Units 08/16/19  0441 08/15/19  0530   SODIUM mmol/L 144 141   POTASSIUM mmol/L 3 9 4 1   CHLORIDE  Hepatitis A vaccine  Aged Out    Hepatitis B vaccine  Aged Out    Hib vaccine  Aged Out    Meningococcal (ACWY) vaccine  Aged Out       RTC 5 months mmol/L 113* 111*   CO2 mmol/L 20* 22   BUN mg/dL 48* 52*   CREATININE mg/dL 2 44* 2 36*   ANION GAP mmol/L 11 8   CALCIUM mg/dL 8 1* 8 3   ALBUMIN g/dL  --  3 0*   TOTAL BILIRUBIN mg/dL  --  0 33   ALK PHOS U/L  --  85   ALT U/L  --  17   AST U/L  --  10   GLUCOSE RANDOM mg/dL 155* 148*         Results from last 7 days   Lab Units 08/16/19  1131 08/16/19  0739 08/15/19  2222 08/15/19  1716 08/15/19  1127 08/15/19  0741 08/14/19  2256 08/14/19  2118   POC GLUCOSE mg/dl 139 120 98 164* 117 130 153* 123                   * I Have Reviewed All Lab Data Listed Above  * Additional Pertinent Lab Tests Reviewed:  All Labs Within Last 24 Hours Reviewed    Imaging:    Imaging Reports Reviewed Today Include:   Imaging Personally Reviewed by Myself Includes:      Recent Cultures (last 7 days):     Results from last 7 days   Lab Units 08/14/19  1549   URINE CULTURE  >100,000 cfu/ml Escherichia coli*       Last 24 Hours Medication List:     Current Facility-Administered Medications:  acetaminophen 650 mg Oral Q6H PRN Glenora Mass, DO    amLODIPine 10 mg Oral QAM [place] Mass, DO    ARIPiprazole 30 mg Oral HS Glen[place] Mass, DO    aspirin 81 mg Oral Daily Glenora Mass, DO    busPIRone 5 mg Oral BID [place]ora Mass, DO    cholecalciferol 1,000 Units Oral Daily Glenora Mass, DO    ciprofloxacin 500 mg Oral Q12H [facility] Rosey Kelley MD    cloNIDine 0 3 mg Oral [facility] Glenora Mass, DO    diphenhydrAMINE 25 mg Oral Q6H PRN Glenora Mass, DO    doxazosin 2 mg Oral HS [place] Mass, DO    DULoxetine 60 mg Oral BID [place] Mass, DO    folic acid 4,647 mcg Oral Daily Glenora Mass, DO    heparin (porcine) 5,000 Units Subcutaneous [facility] Glenora Mass, DO    hydrALAZINE 10 mg Intravenous Q6H PRN Rosey Kelley MD    hydrALAZINE 50 mg Oral Q8H [facility], DO    insulin glargine 10 Units Subcutaneous HS Glenora Mass, DO    insulin lispro 1-5 Units Subcutaneous TID AC Glenora Mass, DO    insulin lispro 1-5 Units Subcutaneous HS [place] Mass, DO levothyroxine 125 mcg Oral Early Morning Alvin Martinez, DO    LORazepam 2 mg Oral TID PRN Alvin Martinez, DO    methylPREDNISolone sodium succinate 40 mg Intravenous Q12H [address] Alvin Martinez, DO    metoprolol tartrate 50 mg Oral Q12H [address] Alvin Martinez, DO    ondansetron 4 mg Intravenous Q6H PRN Alvin Martinez, DO    pravastatin 80 mg Oral QPM Alvin Martinez,     rOPINIRole 0 25 mg Oral BID Alvin Martinez,     sertraline 200 mg Oral Daily Alvin Martinez DO    sevelamer 800 mg Oral TID With Meals Alvin Martinez DO    sodium bicarbonate 1,300 mg Oral TID Alvin Martinez,     sodium chloride 100 mL/hr Intravenous Continuous Alvin Martinez DO Last Rate: 100 mL/hr (08/16/19 0757)   tacrolimus 2 mg Oral QPM Alvin Martinez, DO    tacrolimus 3 mg Oral Daily Alvin Martinez DO         Today, Patient Was Seen By: Caro Ca MD    ** Please Note: Dictation voice to text software may have been used in the creation of this document   **

## 2021-02-22 DIAGNOSIS — I10 ESSENTIAL HYPERTENSION: ICD-10-CM

## 2021-02-22 DIAGNOSIS — F32.A DEPRESSIVE DISORDER: ICD-10-CM

## 2021-02-22 DIAGNOSIS — I10 ESSENTIAL HYPERTENSION, BENIGN: ICD-10-CM

## 2021-02-22 RX ORDER — VENLAFAXINE HYDROCHLORIDE 75 MG/1
CAPSULE, EXTENDED RELEASE ORAL
Qty: 90 CAPSULE | Refills: 2 | Status: SHIPPED | OUTPATIENT
Start: 2021-02-22 | End: 2021-11-19

## 2021-02-22 RX ORDER — AMLODIPINE BESYLATE 5 MG/1
TABLET ORAL
Qty: 90 TABLET | Refills: 2 | Status: SHIPPED | OUTPATIENT
Start: 2021-02-22 | End: 2021-11-19

## 2021-03-28 RX ORDER — SIMVASTATIN 20 MG
TABLET ORAL
Qty: 90 TABLET | Refills: 1 | Status: SHIPPED | OUTPATIENT
Start: 2021-03-28 | End: 2021-09-27

## 2021-06-15 ENCOUNTER — OFFICE VISIT (OUTPATIENT)
Dept: PRIMARY CARE CLINIC | Age: 71
End: 2021-06-15
Payer: MEDICARE

## 2021-06-15 VITALS
SYSTOLIC BLOOD PRESSURE: 156 MMHG | WEIGHT: 170 LBS | HEIGHT: 66 IN | HEART RATE: 68 BPM | BODY MASS INDEX: 27.32 KG/M2 | DIASTOLIC BLOOD PRESSURE: 68 MMHG

## 2021-06-15 DIAGNOSIS — E78.00 HYPERCHOLESTEROLEMIA: ICD-10-CM

## 2021-06-15 DIAGNOSIS — F32.5 MAJOR DEPRESSION IN REMISSION (HCC): Primary | ICD-10-CM

## 2021-06-15 DIAGNOSIS — I10 ESSENTIAL HYPERTENSION: ICD-10-CM

## 2021-06-15 DIAGNOSIS — I10 ESSENTIAL HYPERTENSION, BENIGN: ICD-10-CM

## 2021-06-15 PROCEDURE — G9708 BILAT MAST/HX BI /UNILAT MAS: HCPCS | Performed by: FAMILY MEDICINE

## 2021-06-15 PROCEDURE — 4040F PNEUMOC VAC/ADMIN/RCVD: CPT | Performed by: FAMILY MEDICINE

## 2021-06-15 PROCEDURE — G8427 DOCREV CUR MEDS BY ELIG CLIN: HCPCS | Performed by: FAMILY MEDICINE

## 2021-06-15 PROCEDURE — 99214 OFFICE O/P EST MOD 30 MIN: CPT | Performed by: FAMILY MEDICINE

## 2021-06-15 PROCEDURE — G8399 PT W/DXA RESULTS DOCUMENT: HCPCS | Performed by: FAMILY MEDICINE

## 2021-06-15 PROCEDURE — 3017F COLORECTAL CA SCREEN DOC REV: CPT | Performed by: FAMILY MEDICINE

## 2021-06-15 PROCEDURE — 1090F PRES/ABSN URINE INCON ASSESS: CPT | Performed by: FAMILY MEDICINE

## 2021-06-15 PROCEDURE — 1123F ACP DISCUSS/DSCN MKR DOCD: CPT | Performed by: FAMILY MEDICINE

## 2021-06-15 PROCEDURE — 1036F TOBACCO NON-USER: CPT | Performed by: FAMILY MEDICINE

## 2021-06-15 PROCEDURE — G8417 CALC BMI ABV UP PARAM F/U: HCPCS | Performed by: FAMILY MEDICINE

## 2021-06-15 RX ORDER — LISINOPRIL 20 MG/1
20 TABLET ORAL DAILY
Qty: 90 TABLET | Refills: 1 | Status: SHIPPED | OUTPATIENT
Start: 2021-06-15 | End: 2021-12-09

## 2021-06-15 ASSESSMENT — PATIENT HEALTH QUESTIONNAIRE - PHQ9
SUM OF ALL RESPONSES TO PHQ QUESTIONS 1-9: 2
4. FEELING TIRED OR HAVING LITTLE ENERGY: 1
10. IF YOU CHECKED OFF ANY PROBLEMS, HOW DIFFICULT HAVE THESE PROBLEMS MADE IT FOR YOU TO DO YOUR WORK, TAKE CARE OF THINGS AT HOME, OR GET ALONG WITH OTHER PEOPLE: 0
1. LITTLE INTEREST OR PLEASURE IN DOING THINGS: 0
2. FEELING DOWN, DEPRESSED OR HOPELESS: 1
8. MOVING OR SPEAKING SO SLOWLY THAT OTHER PEOPLE COULD HAVE NOTICED. OR THE OPPOSITE, BEING SO FIGETY OR RESTLESS THAT YOU HAVE BEEN MOVING AROUND A LOT MORE THAN USUAL: 0
5. POOR APPETITE OR OVEREATING: 0
9. THOUGHTS THAT YOU WOULD BE BETTER OFF DEAD, OR OF HURTING YOURSELF: 0
3. TROUBLE FALLING OR STAYING ASLEEP: 0
SUM OF ALL RESPONSES TO PHQ9 QUESTIONS 1 & 2: 1
SUM OF ALL RESPONSES TO PHQ QUESTIONS 1-9: 2
6. FEELING BAD ABOUT YOURSELF - OR THAT YOU ARE A FAILURE OR HAVE LET YOURSELF OR YOUR FAMILY DOWN: 0
7. TROUBLE CONCENTRATING ON THINGS, SUCH AS READING THE NEWSPAPER OR WATCHING TELEVISION: 0
SUM OF ALL RESPONSES TO PHQ QUESTIONS 1-9: 2

## 2021-06-15 NOTE — ASSESSMENT & PLAN NOTE
Uncontrolled, continue current medications and add lisinopril. counseled on R/B/A of medication and recheck  1 month.

## 2021-06-15 NOTE — PROGRESS NOTES
Chief Complaint   Patient presents with    Hypertension    Depression    Hyperlipidemia       HPI: June Muller presents for evaluation and management of hypertension, depression and cholesterol. June Muller notes that she has been taking tolerating her blood pressure medicine without lightheadedness or dizziness. She does note she is having some leg swelling. She reports her mood is pretty good. She notes that the thought of turning 80 has made her think of her own mortality a good bit more and she is feeling a little bit sad about that. Today's PHQ:    PHQ Scores 6/15/2021 11/16/2020 6/8/2020 6/7/2018 7/5/2017 5/26/2017   PHQ2 Score 1 1 0 0 0 0   PHQ9 Score 2 2 0 0 0 0     Interpretation of Total Score Depression Severity: 1-4 = Minimal depression, 5-9 = Mild depression, 10-14 = Moderate depression, 15-19 = Moderately severe depression, 20-27 = Severe depression      She notes she is taking tolerating her cholesterol medicine. Notes she has some lower abdominal fullness. She is status post total abdominal hysterectomy and oophorectomy. She notes no fever or chills but does have some intermittent urgency which Azo seems to help. She is 90% better than about 2 weeks ago.   Review of Systems    No Known Allergies  New Prescriptions    LISINOPRIL (PRINIVIL;ZESTRIL) 20 MG TABLET    Take 1 tablet by mouth daily     Current Outpatient Medications   Medication Sig Dispense Refill    lisinopril (PRINIVIL;ZESTRIL) 20 MG tablet Take 1 tablet by mouth daily 90 tablet 1    simvastatin (ZOCOR) 20 MG tablet TAKE ONE TABLET BY MOUTH DAILY 90 tablet 1    metoprolol tartrate (LOPRESSOR) 25 MG tablet TAKE ONE TABLET BY MOUTH TWICE A  tablet 2    venlafaxine (EFFEXOR XR) 75 MG extended release capsule TAKE ONE CAPSULE BY MOUTH DAILY 90 capsule 2    amLODIPine (NORVASC) 5 MG tablet TAKE ONE TABLET BY MOUTH DAILY 90 tablet 2    Cholecalciferol (VITAMIN D3) 2000 units CAPS Take 2,000 Units by mouth daily      Well-controlled, continue meds and recheck 6 months  4. Essential hypertension, benign  Assessment & Plan:   Uncontrolled, continue current medications and add lisinopril. counseled on R/B/A of medication and recheck  1 month. Discussed use, benefit, and side effects of prescribed medications. Barriers to medication compliance addressed. All patient questions answered. Pt voiced understanding. RTC Return in about 4 weeks (around 7/13/2021). none

## 2021-06-15 NOTE — PATIENT INSTRUCTIONS
\"[employer]\" by Daniele Seo      Patient Education        DASH Diet: Care Instructions  Your Care Instructions     The DASH diet is an eating plan that can help lower your blood pressure. DASH stands for Dietary Approaches to Stop Hypertension. Hypertension is high blood pressure. The DASH diet focuses on eating foods that are high in calcium, potassium, and magnesium. These nutrients can lower blood pressure. The foods that are highest in these nutrients are fruits, vegetables, low-fat dairy products, nuts, seeds, and legumes. But taking calcium, potassium, and magnesium supplements instead of eating foods that are high in those nutrients does not have the same effect. The DASH diet also includes whole grains, fish, and poultry. The DASH diet is one of several lifestyle changes your doctor may recommend to lower your high blood pressure. Your doctor may also want you to decrease the amount of sodium in your diet. Lowering sodium while following the DASH diet can lower blood pressure even further than just the DASH diet alone. Follow-up care is a key part of your treatment and safety. Be sure to make and go to all appointments, and call your doctor if you are having problems. It's also a good idea to know your test results and keep a list of the medicines you take. How can you care for yourself at home? Following the DASH diet  · Eat 4 to 5 servings of fruit each day. A serving is 1 medium-sized piece of fruit, ½ cup chopped or canned fruit, 1/4 cup dried fruit, or 4 ounces (½ cup) of fruit juice. Choose fruit more often than fruit juice. · Eat 4 to 5 servings of vegetables each day. A serving is 1 cup of lettuce or raw leafy vegetables, ½ cup of chopped or cooked vegetables, or 4 ounces (½ cup) of vegetable juice. Choose vegetables more often than vegetable juice. · Get 2 to 3 servings of low-fat and fat-free dairy each day. A serving is 8 ounces of milk, 1 cup of yogurt, or 1 ½ ounces of cheese.   · Eat 6 to 8 servings of grains each day. A serving is 1 slice of bread, 1 ounce of dry cereal, or ½ cup of cooked rice, pasta, or cooked cereal. Try to choose whole-grain products as much as possible. · Limit lean meat, poultry, and fish to 2 servings each day. A serving is 3 ounces, about the size of a deck of cards. · Eat 4 to 5 servings of nuts, seeds, and legumes (cooked dried beans, lentils, and split peas) each week. A serving is 1/3 cup of nuts, 2 tablespoons of seeds, or ½ cup of cooked beans or peas. · Limit fats and oils to 2 to 3 servings each day. A serving is 1 teaspoon of vegetable oil or 2 tablespoons of salad dressing. · Limit sweets and added sugars to 5 servings or less a week. A serving is 1 tablespoon jelly or jam, ½ cup sorbet, or 1 cup of lemonade. · Eat less than 2,300 milligrams (mg) of sodium a day. If you limit your sodium to 1,500 mg a day, you can lower your blood pressure even more. · Be aware that all of these are the suggested number of servings for people who eat 1,800 to 2,000 calories a day. Your recommended number of servings may be different if you need more or fewer calories. Tips for success  · Start small. Do not try to make dramatic changes to your diet all at once. You might feel that you are missing out on your favorite foods and then be more likely to not follow the plan. Make small changes, and stick with them. Once those changes become habit, add a few more changes. · Try some of the following:  ? Make it a goal to eat a fruit or vegetable at every meal and at snacks. This will make it easy to get the recommended amount of fruits and vegetables each day. ? Try yogurt topped with fruit and nuts for a snack or healthy dessert. ? Add lettuce, tomato, cucumber, and onion to sandwiches. ? Combine a ready-made pizza crust with low-fat mozzarella cheese and lots of vegetable toppings. Try using tomatoes, squash, spinach, broccoli, carrots, cauliflower, and onions. ?  Have a variety of cut-up vegetables with a low-fat dip as an appetizer instead of chips and dip. ? Sprinkle sunflower seeds or chopped almonds over salads. Or try adding chopped walnuts or almonds to cooked vegetables. ? Try some vegetarian meals using beans and peas. Add garbanzo or kidney beans to salads. Make burritos and tacos with mashed stahl beans or black beans. Where can you learn more? Go to [email or web address] org and sign in to your [employer] account. Enter U379 in the [employer] box to learn more about \"DASH Diet: Care Instructions. \"     If you do not have an account, please click on the \"Sign Up Now\" link. Current as of: August 31, 2020               Content Version: 12.8  © 4283-8845 Healthwise, Incorporated. Care instructions adapted under license by [facility] ([facility]). If you have questions about a medical condition or this instruction, always ask your healthcare professional. [address] any warranty or liability for your use of this information.

## 2021-06-22 ENCOUNTER — PATIENT MESSAGE (OUTPATIENT)
Dept: PRIMARY CARE CLINIC | Age: 71
End: 2021-06-22

## 2021-06-23 NOTE — TELEPHONE ENCOUNTER
From: Ami Aguila  To: Raymond Pedraza MD  Sent: 6/22/2021 10:46 PM EDT  Subject: Prescription Question    Dr. Ruth Santana:    I have been on the Lisinopril for one week. I have not felt well since taking the medication. It has lowered my BP to 130/68. However, I am extremely tired, I shiver at times, I have muscle and joint aches. My back hurts above my waist. My neck hurts when I turn from side to side. I also feel flushed at times. I have not felt normal since taking it. Joelle Hendricks and I are concerned.      Silvana Callahan

## 2021-07-26 ENCOUNTER — OFFICE VISIT (OUTPATIENT)
Dept: PRIMARY CARE CLINIC | Age: 71
End: 2021-07-26
Payer: MEDICARE

## 2021-07-26 VITALS
DIASTOLIC BLOOD PRESSURE: 64 MMHG | HEIGHT: 66 IN | SYSTOLIC BLOOD PRESSURE: 127 MMHG | HEART RATE: 77 BPM | WEIGHT: 169 LBS | BODY MASS INDEX: 27.16 KG/M2

## 2021-07-26 DIAGNOSIS — I10 ESSENTIAL HYPERTENSION: ICD-10-CM

## 2021-07-26 DIAGNOSIS — F32.5 MAJOR DEPRESSION IN REMISSION (HCC): ICD-10-CM

## 2021-07-26 DIAGNOSIS — R73.09 ELEVATED GLUCOSE: ICD-10-CM

## 2021-07-26 DIAGNOSIS — Z00.00 ROUTINE GENERAL MEDICAL EXAMINATION AT A HEALTH CARE FACILITY: ICD-10-CM

## 2021-07-26 DIAGNOSIS — Z00.00 MEDICARE ANNUAL WELLNESS VISIT, SUBSEQUENT: Primary | ICD-10-CM

## 2021-07-26 LAB — HBA1C MFR BLD: 5.2 %

## 2021-07-26 PROCEDURE — 3017F COLORECTAL CA SCREEN DOC REV: CPT | Performed by: FAMILY MEDICINE

## 2021-07-26 PROCEDURE — 83036 HEMOGLOBIN GLYCOSYLATED A1C: CPT | Performed by: FAMILY MEDICINE

## 2021-07-26 PROCEDURE — G9708 BILAT MAST/HX BI /UNILAT MAS: HCPCS | Performed by: FAMILY MEDICINE

## 2021-07-26 PROCEDURE — G0439 PPPS, SUBSEQ VISIT: HCPCS | Performed by: FAMILY MEDICINE

## 2021-07-26 PROCEDURE — G8427 DOCREV CUR MEDS BY ELIG CLIN: HCPCS | Performed by: FAMILY MEDICINE

## 2021-07-26 PROCEDURE — 1036F TOBACCO NON-USER: CPT | Performed by: FAMILY MEDICINE

## 2021-07-26 PROCEDURE — 1090F PRES/ABSN URINE INCON ASSESS: CPT | Performed by: FAMILY MEDICINE

## 2021-07-26 PROCEDURE — 96127 BRIEF EMOTIONAL/BEHAV ASSMT: CPT | Performed by: FAMILY MEDICINE

## 2021-07-26 PROCEDURE — G8417 CALC BMI ABV UP PARAM F/U: HCPCS | Performed by: FAMILY MEDICINE

## 2021-07-26 PROCEDURE — 99214 OFFICE O/P EST MOD 30 MIN: CPT | Performed by: FAMILY MEDICINE

## 2021-07-26 PROCEDURE — 4040F PNEUMOC VAC/ADMIN/RCVD: CPT | Performed by: FAMILY MEDICINE

## 2021-07-26 PROCEDURE — 1123F ACP DISCUSS/DSCN MKR DOCD: CPT | Performed by: FAMILY MEDICINE

## 2021-07-26 PROCEDURE — G8399 PT W/DXA RESULTS DOCUMENT: HCPCS | Performed by: FAMILY MEDICINE

## 2021-07-26 ASSESSMENT — ENCOUNTER SYMPTOMS
SHORTNESS OF BREATH: 0
CONSTIPATION: 0
DIARRHEA: 0
VOMITING: 0
COUGH: 0
NAUSEA: 0
ABDOMINAL PAIN: 0

## 2021-07-26 ASSESSMENT — PATIENT HEALTH QUESTIONNAIRE - PHQ9
SUM OF ALL RESPONSES TO PHQ QUESTIONS 1-9: 1
SUM OF ALL RESPONSES TO PHQ QUESTIONS 1-9: 1
9. THOUGHTS THAT YOU WOULD BE BETTER OFF DEAD, OR OF HURTING YOURSELF: 0
SUM OF ALL RESPONSES TO PHQ QUESTIONS 1-9: 1
2. FEELING DOWN, DEPRESSED OR HOPELESS: 0
8. MOVING OR SPEAKING SO SLOWLY THAT OTHER PEOPLE COULD HAVE NOTICED. OR THE OPPOSITE, BEING SO FIGETY OR RESTLESS THAT YOU HAVE BEEN MOVING AROUND A LOT MORE THAN USUAL: 0
5. POOR APPETITE OR OVEREATING: 0
3. TROUBLE FALLING OR STAYING ASLEEP: 0
SUM OF ALL RESPONSES TO PHQ9 QUESTIONS 1 & 2: 0
1. LITTLE INTEREST OR PLEASURE IN DOING THINGS: 0
7. TROUBLE CONCENTRATING ON THINGS, SUCH AS READING THE NEWSPAPER OR WATCHING TELEVISION: 0
10. IF YOU CHECKED OFF ANY PROBLEMS, HOW DIFFICULT HAVE THESE PROBLEMS MADE IT FOR YOU TO DO YOUR WORK, TAKE CARE OF THINGS AT HOME, OR GET ALONG WITH OTHER PEOPLE: 0
4. FEELING TIRED OR HAVING LITTLE ENERGY: 1
6. FEELING BAD ABOUT YOURSELF - OR THAT YOU ARE A FAILURE OR HAVE LET YOURSELF OR YOUR FAMILY DOWN: 0

## 2021-07-26 ASSESSMENT — LIFESTYLE VARIABLES
AUDIT TOTAL SCORE: 4
HOW OFTEN DURING THE LAST YEAR HAVE YOU FOUND THAT YOU WERE NOT ABLE TO STOP DRINKING ONCE YOU HAD STARTED: 0
HOW OFTEN DURING THE LAST YEAR HAVE YOU NEEDED AN ALCOHOLIC DRINK FIRST THING IN THE MORNING TO GET YOURSELF GOING AFTER A NIGHT OF HEAVY DRINKING: 0
AUDIT TOTAL SCORE: 0
HAVE YOU OR SOMEONE ELSE BEEN INJURED AS A RESULT OF YOUR DRINKING: NO
AUDIT-C TOTAL SCORE: 0
HAS A RELATIVE, FRIEND, DOCTOR, OR ANOTHER HEALTH PROFESSIONAL EXPRESSED CONCERN ABOUT YOUR DRINKING OR SUGGESTED YOU CUT DOWN: NO
HOW MANY STANDARD DRINKS CONTAINING ALCOHOL DO YOU HAVE ON A TYPICAL DAY: 0
HAS A RELATIVE, FRIEND, DOCTOR, OR ANOTHER HEALTH PROFESSIONAL EXPRESSED CONCERN ABOUT YOUR DRINKING OR SUGGESTED YOU CUT DOWN: 0
HOW OFTEN DURING THE LAST YEAR HAVE YOU BEEN UNABLE TO REMEMBER WHAT HAPPENED THE NIGHT BEFORE BECAUSE YOU HAD BEEN DRINKING: 0
HOW OFTEN DURING THE LAST YEAR HAVE YOU FAILED TO DO WHAT WAS NORMALLY EXPECTED FROM YOU BECAUSE OF DRINKING: 0
AUDIT-C TOTAL SCORE: 4
HOW OFTEN DURING THE LAST YEAR HAVE YOU NEEDED AN ALCOHOLIC DRINK FIRST THING IN THE MORNING TO GET YOURSELF GOING AFTER A NIGHT OF HEAVY DRINKING: NEVER
HOW OFTEN DO YOU HAVE A DRINK CONTAINING ALCOHOL: 4
HOW OFTEN DURING THE LAST YEAR HAVE YOU FAILED TO DO WHAT WAS NORMALLY EXPECTED FROM YOU BECAUSE OF DRINKING: NEVER
HOW OFTEN DO YOU HAVE SIX OR MORE DRINKS ON ONE OCCASION: 0
HOW OFTEN DURING THE LAST YEAR HAVE YOU BEEN UNABLE TO REMEMBER WHAT HAPPENED THE NIGHT BEFORE BECAUSE YOU HAD BEEN DRINKING: NEVER
HOW OFTEN DURING THE LAST YEAR HAVE YOU FOUND THAT YOU WERE NOT ABLE TO STOP DRINKING ONCE YOU HAD STARTED: NEVER
HOW OFTEN DURING THE LAST YEAR HAVE YOU HAD A FEELING OF GUILT OR REMORSE AFTER DRINKING: 0
HOW OFTEN DO YOU HAVE SIX OR MORE DRINKS ON ONE OCCASION: NEVER
HAVE YOU OR SOMEONE ELSE BEEN INJURED AS A RESULT OF YOUR DRINKING: 0
HOW OFTEN DURING THE LAST YEAR HAVE YOU HAD A FEELING OF GUILT OR REMORSE AFTER DRINKING: NEVER
HOW MANY STANDARD DRINKS CONTAINING ALCOHOL DO YOU HAVE ON A TYPICAL DAY: ONE OR TWO
HOW OFTEN DO YOU HAVE A DRINK CONTAINING ALCOHOL: FOUR OR MORE TIMES A WEEK

## 2021-07-26 NOTE — PROGRESS NOTES
Chief Complaint   Patient presents with    Medicare AWV    Hypertension    Other     increase glucose     Depression       HPI: Maria Antonia Salamanca presents for evaluation and management of annual wellness visit, hypertension, history of elevated blood sugar and depression. Abdiel Mcgill notes she is feeling well. She notes no issues with falls, mental decline, or diet. She does have hearing aids and she gets her hearing checked routinely. She has not been to the eye doctor in some time and she is keeping up with her dental care. She does not have a living well but is willing to complete one. She notes she restarted her lisinopril and while she has a slight cough she is not sure if it is related to the medicine or allergies. She is taking and tolerating her blood pressure medicine without lightheadedness or dizziness. She continues on her cholesterol medicine and notes no abdominal pain or myalgia. She has a history of elevated glucose and is concerned about the possibility of diabetes. She reports her mood is pretty good. She started doing yoga and is started reading the book the [employer] but she is not really engaged in it yet. Today's PHQ:    PHQ Scores 7/26/2021 6/15/2021 11/16/2020 6/8/2020 6/7/2018 7/5/2017 5/26/2017   PHQ2 Score 0 1 1 0 0 0 0   PHQ9 Score 1 2 2 0 0 0 0     Interpretation of Total Score Depression Severity: 1-4 = Minimal depression, 5-9 = Mild depression, 10-14 = Moderate depression, 15-19 = Moderately severe depression, 20-27 = Severe depression        Review of Systems   Constitutional: Negative for chills and fever. HENT: Positive for hearing loss. Respiratory: Negative for cough and shortness of breath. Cardiovascular: Negative for chest pain and palpitations. Gastrointestinal: Negative for abdominal pain, constipation, diarrhea, nausea and vomiting. Endocrine: Negative for polyuria. Genitourinary: Negative for dysuria.        No Known Allergies  New Prescriptions No medications on file     Current Outpatient Medications   Medication Sig Dispense Refill    lisinopril (PRINIVIL;ZESTRIL) 20 MG tablet Take 1 tablet by mouth daily 90 tablet 1    simvastatin (ZOCOR) 20 MG tablet TAKE ONE TABLET BY MOUTH DAILY 90 tablet 1    metoprolol tartrate (LOPRESSOR) 25 MG tablet TAKE ONE TABLET BY MOUTH TWICE A  tablet 2    venlafaxine (EFFEXOR XR) 75 MG extended release capsule TAKE ONE CAPSULE BY MOUTH DAILY 90 capsule 2    amLODIPine (NORVASC) 5 MG tablet TAKE ONE TABLET BY MOUTH DAILY 90 tablet 2    Cholecalciferol (VITAMIN D3) 2000 units CAPS Take 2,000 Units by mouth daily      CRANBERRY CONCENTRATE PO Take by mouth      Probiotic Product (PROBIOTIC DAILY PO) Take by mouth      fluticasone (FLONASE) 50 MCG/ACT nasal spray INSTILL 1 SPRAY INTO THE NOSTRILS DAILY 1 Bottle 0    Omega-3 Fatty Acids (FISH OIL PO) Take  by mouth.  Multiple Vitamins-Minerals (THERAPEUTIC MULTIVITAMIN-MINERALS) tablet Take 1 tablet by mouth daily.  Ascorbic Acid (VITAMIN C) 500 MG tablet Take 500 mg by mouth daily. No current facility-administered medications for this visit.        Past Medical History:   Diagnosis Date    Allergic rhinitis     Basal cell cancer May 2014    Forehead: Dr. Theo Ashton BRCA positive     Depressive disorder     Hearing loss     HTN (hypertension)     Hx of bilateral mastectomy 7/27/2015    Hypercholesterolemia     Malignant neoplasm of female breast [facility])     s/p B Mastectomy - Dr. Kenia Coughlin 2004    Mild aortic sclerosis 01/17/2019    ECHO 01/17/19    Non-thrombocytopenic purpura ([address]     Osteopenia     DXA 10/12/11 Improving densitiy L hip    Skin lesion     4mm lesion L nose- dr. Desmond Flaherty - (benign)    Stress incontinence 10/5/2012    Trace aortic valve regurgitation 01/17/2019    ECHO 01/17/19    Vertigo          Objective   /64   Pulse 77   Ht 5' 6\" (1.676 m)   Wt 169 lb (76.7 kg)   BMI 27.28 kg/m²   Wt Readings from Last 3 Encounters:   07/26/21 169 lb (76.7 kg)   06/15/21 170 lb (77.1 kg)   12/28/20 170 lb (77.1 kg)       Physical Exam  Constitutional:       Appearance: She is well-developed. Cardiovascular:      Rate and Rhythm: Normal rate and regular rhythm. Pulses:           Dorsalis pedis pulses are 2+ on the right side and 2+ on the left side. Posterior tibial pulses are 2+ on the right side and 2+ on the left side. Heart sounds: No murmur heard. No friction rub. No gallop. Comments: No Lower Extremity Edema  Pulmonary:      Effort: Pulmonary effort is normal.      Breath sounds: Normal breath sounds. No wheezing or rales. Abdominal:      General: Bowel sounds are normal. There is no distension. Palpations: Abdomen is soft. There is no mass. Tenderness: There is no abdominal tenderness. Skin:     General: Skin is warm and dry. Findings: No rash. Psychiatric:         Mood and Affect: Mood normal.         Behavior: Behavior normal.         Thought Content:  Thought content normal.           Chemistry        Component Value Date/Time     11/16/2020 1052    K 4.3 11/16/2020 1052     11/16/2020 1052    CO2 33 (H) 11/16/2020 1052    BUN 19 11/16/2020 1052    CREATININE 0.6 11/16/2020 1052        Component Value Date/Time    CALCIUM 9.9 11/16/2020 1052    ALKPHOS 94 11/16/2020 1052    AST 24 11/16/2020 1052    ALT 23 11/16/2020 1052    BILITOT 0.3 11/16/2020 1052          Lab Results   Component Value Date    WBC 6.4 06/08/2020    HGB 13.7 06/08/2020    HCT 41.5 06/08/2020    MCV 96.8 06/08/2020     06/08/2020     Lab Results   Component Value Date    LABA1C 5.2 07/26/2021     No results found for: EAG  Lab Results   Component Value Date    LABA1C 5.2 07/26/2021     No components found for: CHLPL  Lab Results   Component Value Date    TRIG 121 11/16/2020    TRIG 139 11/04/2019    TRIG 147 11/19/2018     Lab Results   Component Value Date    HDL 76 (H) 11/16/2020  (H) 2019    HDL 71 (H) 2018     Lab Results   Component Value Date    LDLCALC 116 (H) 2020    LDLCALC 105 (H) 2019    LDLCALC 93 2018     Lab Results   Component Value Date    LABVLDL 24 2020    LABVLDL 28 2019    LABVLDL 29 2018         Assessment   Plan   1. Medicare annual wellness visit, subsequent   Appears well:  Counselled diet, development, anticipatory guidance and safety issues with patient and or parent(s). Encourage patient to go get her eyes examined. Asked patient to complete her living well. Continue follow-up with hearing assessments    2. Essential hypertension  Controlled: Appears stable. We will continue current management and monitor for adverse reaction and disease progression. Follow-up as noted below  -     Comprehensive Metabolic Panel; Future  3. Major depression in remission ([address]  Controlled: Appears stable. We will continue current management and monitor for adverse reaction and disease progression. Follow-up as noted below  -     [place] BEHAV ASSMT W/SCORE & DOCD/STAND INSTRUMENT  4. Elevated glucose  Hemoglobin A1c was 5.2 today. Counseled patient to work on diet and exercise-     POCT glycosylated hemoglobin (Hb A1C)  5. Routine general medical examination at a health care facility     Discussed use, benefit, and side effects of prescribed medications. Barriers to medication compliance addressed. All patient questions answered. Pt voiced understanding. RTC Return in about 6 months (around 2022). PHQ-9 Total Score: 1 (2021  1:29 PM)  Thoughts that you would be better off dead, or of hurting yourself in some way: 0 (2021  1:29 PM)    Medicare Annual Wellness Visit  Name: Summer Salazar Date: 2021   MRN: <[id number]> Sex: Female   Age: 72 y.o.  Ethnicity: Non- / Non    : 1950 Race: White (non-)      Arturo Prescott is here for Medicare AWV, Hypertension, Diabetes, and Depression  Pt does need eye exam and living will. Last eye exam over 1 yr. Had recent dental exam and hearing is appropriate wears hearing aids. Would like her  to be primary person for decision making. Did give paperwork for living will and educated on the importance of a living will. Reports no falls and does exercise as tolerated weekly. Screenings for behavioral, psychosocial and functional/safety risks, and cognitive dysfunction are all negative except as indicated below. These results, as well as other patient data from the [address] form, are documented in Flowsheets linked to this Encounter. No Known Allergies    Prior to Visit Medications    Medication Sig Taking? Authorizing Provider   lisinopril (PRINIVIL;ZESTRIL) 20 MG tablet Take 1 tablet by mouth daily Yes Patricia Barnes MD   simvastatin (ZOCOR) 20 MG tablet TAKE ONE TABLET BY MOUTH DAILY Yes Tirso Costa DO   metoprolol tartrate (LOPRESSOR) 25 MG tablet TAKE ONE TABLET BY MOUTH TWICE A DAY Yes Patricia Barnes MD   venlafaxine (EFFEXOR XR) 75 MG extended release capsule TAKE ONE CAPSULE BY MOUTH DAILY Yes Patricia Barnes MD   amLODIPine (NORVASC) 5 MG tablet TAKE ONE TABLET BY MOUTH DAILY Yes Patricia Barnes MD   Cholecalciferol (VITAMIN D3) 2000 units CAPS Take 2,000 Units by mouth daily Yes Historical Provider, MD   CRANBERRY CONCENTRATE PO Take by mouth Yes Historical Provider, MD   Probiotic Product (PROBIOTIC DAILY PO) Take by mouth Yes Historical Provider, MD   fluticasone (FLONASE) 50 MCG/ACT nasal spray INSTILL 1 SPRAY INTO THE NOSTRILS DAILY Yes Patricia Barnes MD   Omega-3 Fatty Acids (FISH OIL PO) Take  by mouth. Yes Historical Provider, MD   Multiple Vitamins-Minerals (THERAPEUTIC MULTIVITAMIN-MINERALS) tablet Take 1 tablet by mouth daily. Yes Historical Provider, MD   Ascorbic Acid (VITAMIN C) 500 MG tablet Take 500 mg by mouth daily.  Yes Historical Provider, MD       Past Medical History:   Diagnosis Date    Allergic rhinitis     Basal cell cancer May 2014    Forehead: Dr. Mildred Ross BRCA positive     Depressive disorder     Hearing loss     HTN (hypertension)     Hx of bilateral mastectomy 2015    Hypercholesterolemia     Malignant neoplasm of female breast [facility])     s/p B Mastectomy - Dr. Dedra Carr     Mild aortic sclerosis 2019    ECHO 19    Non-thrombocytopenic purpura ([address]     Osteopenia     DXA 10/12/11 Improving densitiy L hip    Skin lesion     4mm lesion L nose- dr. Bud Emery - (benign)    Stress incontinence 10/5/2012    Trace aortic valve regurgitation 2019    ECHO 19    Vertigo        Past Surgical History:   Procedure Laterality Date    APPENDECTOMY      COLONOSCOPY  2006    HYSTERECTOMY      KALIN/BSO    MASTECTOMY  2004    Bilateral    OVARY REMOVAL         Family History   Problem Relation Age of Onset    High Cholesterol Father          at 80   [facility] Elevated Lipids Father     Hypertension Father     Coronary Art Dis Father     Alzheimer's Disease Mother          at 80    Other Mother         Hip FX and Pulm Embolism     Hypertension Mother     Breast Cancer Sister         Dead    Stroke Daughter     Depression Daughter        CareTeam (Including outside providers/suppliers regularly involved in providing care):   Patient Care Team:  Dee Kenney MD as PCP - General (Family Medicine)  Ector Coppola MD as PCP - Hematology/Oncology (Hematology and Oncology)  Dee Kenney MD as PCP - [facility]  Cruz Larson MD as Surgeon (Plastic Surgery)    Wt Readings from Last 3 Encounters:   21 169 lb (76.7 kg)   06/15/21 170 lb (77.1 kg)   20 170 lb (77.1 kg)     Vitals:    21 1319   BP: 127/64   Pulse: 77   Weight: 169 lb (76.7 kg)   Height: 5' 6\" (1.676 m)     Body mass index is 27.28 kg/m².     Based upon direct observation of the patient, evaluation of cognition reveals. Answered all cognitive questions appropriately     Patient's complete Health Risk Assessment and screening values have been reviewed and are found in Flowsheets. The following problems were reviewed today and where indicated follow up appointments were made and/or referrals ordered. Positive Risk Factor Screenings with Interventions:          General Health and ACP:  General  In general, how would you say your health is?: Very Good  In the past 7 days, have you experienced any of the following?  New or Increased Pain, New or Increased Fatigue, Loneliness, Social Isolation, Stress or Anger?: None of These  Do you get the social and emotional support that you need?: Yes  Do you have a Living Will?: (!) No  Advance Directives     Power of  Living Will ACP-Advance Directive ACP-Power of     Not on File Not on File Not on File Not on File      General Health Risk Interventions:  · No Living Will: given paperwork         Personalized Preventive Plan   Current Health Maintenance Status  Immunization History   Administered Date(s) Administered    COVID-19, Moderna, PF, 100mcg/0.5mL 03/17/2021    DTaP vaccine 09/21/2011    Influenza 10/05/2012, 10/17/2013, 09/22/2015    Influenza Vaccine, unspecified formulation 09/21/2011, 10/05/2012, 10/17/2013, 12/01/2017    Influenza Virus Vaccine 09/10/2009, 10/26/2010, 09/21/2011, 10/09/2014, 09/22/2015    Influenza Whole 10/26/2010    Influenza, Intradermal, Quadrivalent, Preservative Free 12/07/2017    Influenza, Quadv, IM, PF (6 mo and older Fluzone, Flulaval, Fluarix, and 3 yrs and older Afluria) 11/18/2016    Influenza, Quadv, Recombinant, IM PF (Flublok 18 yrs and older) 11/19/2018    Influenza, Quadv, adjuvanted, 65 yrs +, IM, PF (Fluad) 11/16/2020    Influenza, Triv, inactivated, subunit, adjuvanted, IM (Fluad 65 yrs and older) 11/04/2019    Pneumococcal Conjugate 13-valent ([id number]) 05/26/2017    Pneumococcal Polysaccharide ([id number]) 06/07/2018    Tdap (Boostrix, Adacel) 09/21/2011        Health Maintenance   Topic Date Due    Diabetes screen  Never done    Shingles Vaccine (1 of 2) Never done   [employer] Visit (AWV)  Never done    Flu vaccine (1) 09/01/2021    DTaP/Tdap/Td vaccine (2 - Td or Tdap) 09/21/2021    Lipid screen  11/16/2021    Potassium monitoring  11/16/2021    Creatinine monitoring  11/16/2021    Colon cancer screen colonoscopy  11/09/2026    DEXA (modify frequency per FRAX score)  Completed    Pneumococcal 65+ years Vaccine  Completed    COVID-19 Vaccine  Completed    Hepatitis C screen  Completed    Hepatitis A vaccine  Aged Out    Hepatitis B vaccine  Aged Out    Hib vaccine  Aged Out    Meningococcal (ACWY) vaccine  Aged Out     Recommendations for [employer] Due: see orders and patient instructions/AVS.  . Recommended screening schedule for the next 5-10 years is provided to the patient in written form: see Patient Instructions/AVS.    Marika SOLOMON LPN, 7/17/5224, performed the documented evaluation under the direct supervision of the attending physician.

## 2021-07-26 NOTE — PATIENT INSTRUCTIONS
range of preventive health benefits. Some of the tests and screenings are paid in full while other may be subject to a deductible, co-insurance, and/or copay. Some of these benefits include a comprehensive review of your medical history including lifestyle, illnesses that may run in your family, and various assessments and screenings as appropriate. After reviewing your medical record and screening and assessments performed today your provider may have ordered immunizations, labs, imaging, and/or referrals for you. A list of these orders (if applicable) as well as your Preventive Care list are included within your After Visit Summary for your review. Other Preventive Recommendations:    A preventive eye exam performed by an eye specialist is recommended every 1-2 years to screen for glaucoma; cataracts, macular degeneration, and other eye disorders. A preventive dental visit is recommended every 6 months. Try to get at least 150 minutes of exercise per week or 10,000 steps per day on a pedometer . Order or download the FREE \"Exercise & Physical Activity: Your Everyday Guide\" from [employer]. Call 9-239.115.9633 or search [employer] online. You need 7437-8355 mg of calcium and 9842-2724 IU of vitamin D per day. It is possible to meet your calcium requirement with diet alone, but a vitamin D supplement is usually necessary to meet this goal.  When exposed to the sun, use a sunscreen that protects against both UVA and UVB radiation with an SPF of 30 or greater. Reapply every 2 to 3 hours or after sweating, drying off with a towel, or swimming. Always wear a seat belt when traveling in a car. Always wear a helmet when riding a bicycle or motorcycle.

## 2021-07-27 LAB
A/G RATIO: 2 (ref 1.1–2.2)
ALBUMIN SERPL-MCNC: 4.9 G/DL (ref 3.4–5)
ALP BLD-CCNC: 79 U/L (ref 40–129)
ALT SERPL-CCNC: 23 U/L (ref 10–40)
ANION GAP SERPL CALCULATED.3IONS-SCNC: 14 MMOL/L (ref 3–16)
AST SERPL-CCNC: 25 U/L (ref 15–37)
BILIRUB SERPL-MCNC: 0.4 MG/DL (ref 0–1)
BUN BLDV-MCNC: 12 MG/DL (ref 7–20)
CALCIUM SERPL-MCNC: 10 MG/DL (ref 8.3–10.6)
CHLORIDE BLD-SCNC: 103 MMOL/L (ref 99–110)
CO2: 29 MMOL/L (ref 21–32)
CREAT SERPL-MCNC: 0.7 MG/DL (ref 0.6–1.2)
GFR AFRICAN AMERICAN: >60
GFR NON-AFRICAN AMERICAN: >60
GLOBULIN: 2.5 G/DL
GLUCOSE BLD-MCNC: 93 MG/DL (ref 70–99)
POTASSIUM SERPL-SCNC: 4.8 MMOL/L (ref 3.5–5.1)
SODIUM BLD-SCNC: 146 MMOL/L (ref 136–145)
TOTAL PROTEIN: 7.4 G/DL (ref 6.4–8.2)

## 2021-09-27 RX ORDER — SIMVASTATIN 20 MG
TABLET ORAL
Qty: 90 TABLET | Refills: 1 | Status: SHIPPED | OUTPATIENT
Start: 2021-09-27 | End: 2022-03-24 | Stop reason: SDUPTHER

## 2021-09-27 NOTE — TELEPHONE ENCOUNTER
Medication:   Requested Prescriptions     Pending Prescriptions Disp Refills    simvastatin (ZOCOR) 20 MG tablet [Pharmacy Med Name: SIMVASTATIN 20 MG TABLET] 90 tablet 1     Sig: TAKE ONE TABLET BY MOUTH DAILY        Last Filled:  3.28.2021    Patient Phone Number: [phone] (home) [phone] (work)    Last appt: 7/26/2021   Next appt: 1/24/2022    Last OARRS: No flowsheet data found.    /

## 2021-11-19 DIAGNOSIS — I10 ESSENTIAL HYPERTENSION, BENIGN: ICD-10-CM

## 2021-11-19 DIAGNOSIS — F32.A DEPRESSIVE DISORDER: ICD-10-CM

## 2021-11-19 DIAGNOSIS — I10 ESSENTIAL HYPERTENSION: ICD-10-CM

## 2021-11-19 RX ORDER — VENLAFAXINE HYDROCHLORIDE 75 MG/1
CAPSULE, EXTENDED RELEASE ORAL
Qty: 90 CAPSULE | Refills: 2 | Status: SHIPPED | OUTPATIENT
Start: 2021-11-19 | End: 2022-07-25 | Stop reason: SDUPTHER

## 2021-11-19 RX ORDER — AMLODIPINE BESYLATE 5 MG/1
TABLET ORAL
Qty: 90 TABLET | Refills: 2 | Status: SHIPPED | OUTPATIENT
Start: 2021-11-19 | End: 2022-07-25 | Stop reason: SDUPTHER

## 2021-11-19 NOTE — TELEPHONE ENCOUNTER
This pt is requesting refills on there medications. She was last seen 7/26/21 and her next appointment is 1/24/22.

## 2021-12-09 DIAGNOSIS — I10 ESSENTIAL HYPERTENSION: ICD-10-CM

## 2021-12-09 RX ORDER — LISINOPRIL 20 MG/1
TABLET ORAL
Qty: 90 TABLET | Refills: 1 | Status: SHIPPED | OUTPATIENT
Start: 2021-12-09 | End: 2022-06-09

## 2021-12-09 NOTE — TELEPHONE ENCOUNTER
Medication:   Requested Prescriptions     Pending Prescriptions Disp Refills    lisinopril (PRINIVIL;ZESTRIL) 20 MG tablet [Pharmacy Med Name: LISINOPRIL 20 MG TABLET] 90 tablet 1     Sig: TAKE ONE TABLET BY MOUTH DAILY        Last Filled:  06/15/21    Patient Phone Number: [phone] (home) [phone] (work)    Last appt: 7/26/2021   Next appt: 1/24/2022    Last OARRS: No flowsheet data found.

## 2022-01-24 ENCOUNTER — OFFICE VISIT (OUTPATIENT)
Dept: PRIMARY CARE CLINIC | Age: 72
End: 2022-01-24
Payer: MEDICARE

## 2022-01-24 VITALS
RESPIRATION RATE: 16 BRPM | TEMPERATURE: 97.3 F | BODY MASS INDEX: 26.5 KG/M2 | HEART RATE: 69 BPM | DIASTOLIC BLOOD PRESSURE: 62 MMHG | WEIGHT: 164.2 LBS | SYSTOLIC BLOOD PRESSURE: 126 MMHG

## 2022-01-24 DIAGNOSIS — N30.00 ACUTE CYSTITIS WITHOUT HEMATURIA: ICD-10-CM

## 2022-01-24 DIAGNOSIS — I10 ESSENTIAL HYPERTENSION, BENIGN: ICD-10-CM

## 2022-01-24 DIAGNOSIS — F32.5 MAJOR DEPRESSION IN REMISSION (HCC): ICD-10-CM

## 2022-01-24 DIAGNOSIS — E78.00 HYPERCHOLESTEROLEMIA: Primary | ICD-10-CM

## 2022-01-24 LAB
BILIRUBIN, POC: NEGATIVE
BLOOD URINE, POC: NEGATIVE
CLARITY, POC: CLEAR
COLOR, POC: YELLOW
GLUCOSE URINE, POC: NEGATIVE
KETONES, POC: NEGATIVE
LEUKOCYTE EST, POC: ABNORMAL
NITRITE, POC: NEGATIVE
PH, POC: 7.5
PROTEIN, POC: NEGATIVE
SPECIFIC GRAVITY, POC: 1.02
UROBILINOGEN, POC: 0.2

## 2022-01-24 PROCEDURE — 81002 URINALYSIS NONAUTO W/O SCOPE: CPT | Performed by: FAMILY MEDICINE

## 2022-01-24 PROCEDURE — 3017F COLORECTAL CA SCREEN DOC REV: CPT | Performed by: FAMILY MEDICINE

## 2022-01-24 PROCEDURE — G8399 PT W/DXA RESULTS DOCUMENT: HCPCS | Performed by: FAMILY MEDICINE

## 2022-01-24 PROCEDURE — G9708 BILAT MAST/HX BI /UNILAT MAS: HCPCS | Performed by: FAMILY MEDICINE

## 2022-01-24 PROCEDURE — G8427 DOCREV CUR MEDS BY ELIG CLIN: HCPCS | Performed by: FAMILY MEDICINE

## 2022-01-24 PROCEDURE — 1123F ACP DISCUSS/DSCN MKR DOCD: CPT | Performed by: FAMILY MEDICINE

## 2022-01-24 PROCEDURE — G8417 CALC BMI ABV UP PARAM F/U: HCPCS | Performed by: FAMILY MEDICINE

## 2022-01-24 PROCEDURE — 4040F PNEUMOC VAC/ADMIN/RCVD: CPT | Performed by: FAMILY MEDICINE

## 2022-01-24 PROCEDURE — 96127 BRIEF EMOTIONAL/BEHAV ASSMT: CPT | Performed by: FAMILY MEDICINE

## 2022-01-24 PROCEDURE — 1036F TOBACCO NON-USER: CPT | Performed by: FAMILY MEDICINE

## 2022-01-24 PROCEDURE — G8484 FLU IMMUNIZE NO ADMIN: HCPCS | Performed by: FAMILY MEDICINE

## 2022-01-24 PROCEDURE — 1090F PRES/ABSN URINE INCON ASSESS: CPT | Performed by: FAMILY MEDICINE

## 2022-01-24 PROCEDURE — 99214 OFFICE O/P EST MOD 30 MIN: CPT | Performed by: FAMILY MEDICINE

## 2022-01-24 RX ORDER — GREEN TEA/HOODIA GORDONII 315-12.5MG
1 CAPSULE ORAL DAILY
Qty: 30 TABLET | Refills: 0 | Status: SHIPPED | OUTPATIENT
Start: 2022-01-24 | End: 2022-02-23

## 2022-01-24 RX ORDER — ESTRADIOL 0.1 MG/G
1 CREAM VAGINAL DAILY
Qty: 42.5 G | Refills: 5 | Status: SHIPPED | OUTPATIENT
Start: 2022-01-24 | End: 2022-01-27

## 2022-01-24 ASSESSMENT — PATIENT HEALTH QUESTIONNAIRE - PHQ9
1. LITTLE INTEREST OR PLEASURE IN DOING THINGS: 1
6. FEELING BAD ABOUT YOURSELF - OR THAT YOU ARE A FAILURE OR HAVE LET YOURSELF OR YOUR FAMILY DOWN: 0
5. POOR APPETITE OR OVEREATING: 0
SUM OF ALL RESPONSES TO PHQ QUESTIONS 1-9: 2
2. FEELING DOWN, DEPRESSED OR HOPELESS: 0
9. THOUGHTS THAT YOU WOULD BE BETTER OFF DEAD, OR OF HURTING YOURSELF: 0
10. IF YOU CHECKED OFF ANY PROBLEMS, HOW DIFFICULT HAVE THESE PROBLEMS MADE IT FOR YOU TO DO YOUR WORK, TAKE CARE OF THINGS AT HOME, OR GET ALONG WITH OTHER PEOPLE: 0
4. FEELING TIRED OR HAVING LITTLE ENERGY: 1
8. MOVING OR SPEAKING SO SLOWLY THAT OTHER PEOPLE COULD HAVE NOTICED. OR THE OPPOSITE, BEING SO FIGETY OR RESTLESS THAT YOU HAVE BEEN MOVING AROUND A LOT MORE THAN USUAL: 0
3. TROUBLE FALLING OR STAYING ASLEEP: 0
SUM OF ALL RESPONSES TO PHQ9 QUESTIONS 1 & 2: 1
7. TROUBLE CONCENTRATING ON THINGS, SUCH AS READING THE NEWSPAPER OR WATCHING TELEVISION: 0
SUM OF ALL RESPONSES TO PHQ QUESTIONS 1-9: 2

## 2022-01-24 NOTE — PROGRESS NOTES
Chief Complaint   Patient presents with    Hypertension    Depression    Urinary Frequency     pressure also no burning        HPI: Connor Doan  presents for evaluation and management of hypertension, hypercholesterolemia, depression and urinary frequency. Wood Campbell notes that she is taking tolerating her blood pressure medicine without lightheadedness or dizziness. She notes that she is taking and tolerating her cholesterol medicine without abdominal pain or myalgia. She reports her mood is good tolerating her Effexor and notes no thoughts of hurting herself or others. She      She does note a several day history of urinary frequency urgency and a sensation of bloating. She does note vaginal dryness and pain with sex.     Today's PHQ:    PHQ Scores 1/24/2022 7/26/2021 6/15/2021 11/16/2020 6/8/2020 6/7/2018 7/5/2017   PHQ2 Score 1 0 1 1 0 0 0   PHQ9 Score 2 1 2 2 0 0 0     Interpretation of Total Score Depression Severity: 1-4 = Minimal depression, 5-9 = Mild depression, 10-14 = Moderate depression, 15-19 = Moderately severe depression, 20-27 = Severe depression        Review of Systems    No Known Allergies  New Prescriptions    No medications on file     Current Outpatient Medications   Medication Sig Dispense Refill    lisinopril (PRINIVIL;ZESTRIL) 20 MG tablet TAKE ONE TABLET BY MOUTH DAILY 90 tablet 1    venlafaxine (EFFEXOR XR) 75 MG extended release capsule TAKE ONE CAPSULE BY MOUTH DAILY 90 capsule 2    amLODIPine (NORVASC) 5 MG tablet TAKE ONE TABLET BY MOUTH DAILY 90 tablet 2    metoprolol tartrate (LOPRESSOR) 25 MG tablet TAKE ONE TABLET BY MOUTH TWICE A  tablet 2    simvastatin (ZOCOR) 20 MG tablet TAKE ONE TABLET BY MOUTH DAILY 90 tablet 1    Cholecalciferol (VITAMIN D3) 2000 units CAPS Take 2,000 Units by mouth daily      CRANBERRY CONCENTRATE PO Take by mouth      Probiotic Product (PROBIOTIC DAILY PO) Take by mouth      fluticasone (FLONASE) 50 MCG/ACT nasal spray INSTILL 1 SPRAY INTO THE NOSTRILS DAILY 1 Bottle 0    Omega-3 Fatty Acids (FISH OIL PO) Take  by mouth.  Multiple Vitamins-Minerals (THERAPEUTIC MULTIVITAMIN-MINERALS) tablet Take 1 tablet by mouth daily.  Ascorbic Acid (VITAMIN C) 500 MG tablet Take 500 mg by mouth daily. No current facility-administered medications for this visit. Past Medical History:   Diagnosis Date    Allergic rhinitis     Basal cell cancer May 2014    Forehead: Dr. Mikaela Jasso BRCA positive     Depressive disorder     Hearing loss     HTN (hypertension)     Hx of bilateral mastectomy 7/27/2015    Hypercholesterolemia     Malignant neoplasm of female breast [facility])     s/p B Mastectomy - Dr. Vee James 2004    Mild aortic sclerosis 01/17/2019    ECHO 01/17/19    Non-thrombocytopenic purpura ([address]     Osteopenia     DXA 10/12/11 Improving densitiy L hip    Skin lesion     4mm lesion L nose- dr. Gerard Chapa - (benign)    Stress incontinence 10/5/2012    Trace aortic valve regurgitation 01/17/2019    ECHO 01/17/19    Vertigo          Objective   BP (!) 149/66   Pulse 69   Temp 97.3 °F (36.3 °C) (Temporal)   Resp 16   Wt 164 lb 3.2 oz (74.5 kg)   BMI 26.50 kg/m²   Wt Readings from Last 3 Encounters:   01/24/22 164 lb 3.2 oz (74.5 kg)   07/26/21 169 lb (76.7 kg)   06/15/21 170 lb (77.1 kg)       Physical Exam  Constitutional:       Appearance: She is well-developed. Cardiovascular:      Rate and Rhythm: Normal rate and regular rhythm. Pulses:           Dorsalis pedis pulses are 2+ on the right side and 2+ on the left side. Posterior tibial pulses are 2+ on the right side and 2+ on the left side. Heart sounds: No murmur heard. No friction rub. No gallop. Comments: No Lower Extremity Edema  Pulmonary:      Effort: Pulmonary effort is normal.      Breath sounds: Normal breath sounds. No wheezing or rales. Abdominal:      General: Bowel sounds are normal. There is no distension.       Palpations: Abdomen is soft. There is no mass. Tenderness: There is no abdominal tenderness. Skin:     General: Skin is warm and dry. Findings: No rash. Chemistry        Component Value Date/Time     (H) 07/26/2021 1423    K 4.8 07/26/2021 1423     07/26/2021 1423    CO2 29 07/26/2021 1423    BUN 12 07/26/2021 1423    CREATININE 0.7 07/26/2021 1423        Component Value Date/Time    CALCIUM 10.0 07/26/2021 1423    ALKPHOS 79 07/26/2021 1423    AST 25 07/26/2021 1423    ALT 23 07/26/2021 1423    BILITOT 0.4 07/26/2021 1423          Lab Results   Component Value Date    WBC 6.4 06/08/2020    HGB 13.7 06/08/2020    HCT 41.5 06/08/2020    MCV 96.8 06/08/2020     06/08/2020     Lab Results   Component Value Date    LABA1C 5.2 07/26/2021     No results found for: EAG  Lab Results   Component Value Date    LABA1C 5.2 07/26/2021     No components found for: CHLPL  Lab Results   Component Value Date    TRIG 121 11/16/2020    TRIG 139 11/04/2019    TRIG 147 11/19/2018     Lab Results   Component Value Date    HDL 76 (H) 11/16/2020     (H) 11/04/2019    HDL 71 (H) 11/19/2018     Lab Results   Component Value Date    LDLCALC 116 (H) 11/16/2020    LDLCALC 105 (H) 11/04/2019    LDLCALC 93 11/19/2018     Lab Results   Component Value Date    LABVLDL 24 11/16/2020    LABVLDL 28 11/04/2019    LABVLDL 29 11/19/2018         Assessment   Plan   1. Hypercholesterolemia  Controlled: Appears stable. We will continue current management and monitor for adverse reaction and disease progression. Follow-up as noted below    - Lipid Panel; Future  - Comprehensive Metabolic Panel; Future    2. Major depression in remission ([address]  Controlled: Appears stable. We will continue current management and monitor for adverse reaction and disease progression. Follow-up as noted below    - DE BEHAV ASSMT W/SCORE & DOCD/STAND INSTRUMENT    3. Essential hypertension, benign  Controlled: Appears stable.   We will continue current management and monitor for adverse reaction and disease progression. Follow-up as noted below    - Comprehensive Metabolic Panel; Future    4. Acute cystitis without hematuria  We will send urine for culture. If she grows out a predominant bacteria we will send her in antibiotics. In the meantime we will treat her for this with estrogen cream probiotic and acidifying the urine.  - POCT Urinalysis no Micro  - Probiotic Acidophilus (FLORANEX) TABS; Take 1 tablet by mouth daily  Dispense: 30 tablet; Refill: 0  - Culture, Urine  - estradiol (ESTRACE VAGINAL) 0.1 MG/GM vaginal cream; Place 1 g vaginally daily  Dispense: 42.5 g; Refill: 5      Discussed use, benefit, and side effects of prescribed medications. Barriers to medication compliance addressed. All patient questions answered. Pt voiced understanding. RTC Return in about 6 months (around 7/24/2022).

## 2022-01-27 DIAGNOSIS — E78.00 HYPERCHOLESTEROLEMIA: ICD-10-CM

## 2022-01-27 DIAGNOSIS — I10 ESSENTIAL HYPERTENSION, BENIGN: ICD-10-CM

## 2022-01-27 LAB
A/G RATIO: 1.7 (ref 1.1–2.2)
ALBUMIN SERPL-MCNC: 4.6 G/DL (ref 3.4–5)
ALP BLD-CCNC: 101 U/L (ref 40–129)
ALT SERPL-CCNC: 21 U/L (ref 10–40)
ANION GAP SERPL CALCULATED.3IONS-SCNC: 12 MMOL/L (ref 3–16)
AST SERPL-CCNC: 25 U/L (ref 15–37)
BILIRUB SERPL-MCNC: 0.4 MG/DL (ref 0–1)
BUN BLDV-MCNC: 13 MG/DL (ref 7–20)
CALCIUM SERPL-MCNC: 9.8 MG/DL (ref 8.3–10.6)
CHLORIDE BLD-SCNC: 100 MMOL/L (ref 99–110)
CHOLESTEROL, TOTAL: 200 MG/DL (ref 0–199)
CO2: 30 MMOL/L (ref 21–32)
CREAT SERPL-MCNC: 0.8 MG/DL (ref 0.6–1.2)
GFR AFRICAN AMERICAN: >60
GFR NON-AFRICAN AMERICAN: >60
GLUCOSE BLD-MCNC: 96 MG/DL (ref 70–99)
HDLC SERPL-MCNC: 90 MG/DL (ref 40–60)
LDL CHOLESTEROL CALCULATED: 93 MG/DL
ORGANISM: ABNORMAL
POTASSIUM SERPL-SCNC: 4.9 MMOL/L (ref 3.5–5.1)
SODIUM BLD-SCNC: 142 MMOL/L (ref 136–145)
TOTAL PROTEIN: 7.3 G/DL (ref 6.4–8.2)
TRIGL SERPL-MCNC: 86 MG/DL (ref 0–150)
URINE CULTURE, ROUTINE: ABNORMAL
VLDLC SERPL CALC-MCNC: 17 MG/DL

## 2022-03-02 ENCOUNTER — OFFICE VISIT (OUTPATIENT)
Dept: PRIMARY CARE CLINIC | Age: 72
End: 2022-03-02
Payer: MEDICARE

## 2022-03-02 VITALS
HEIGHT: 66 IN | BODY MASS INDEX: 26.2 KG/M2 | SYSTOLIC BLOOD PRESSURE: 130 MMHG | WEIGHT: 163 LBS | HEART RATE: 67 BPM | DIASTOLIC BLOOD PRESSURE: 66 MMHG | TEMPERATURE: 96 F

## 2022-03-02 DIAGNOSIS — I10 ESSENTIAL HYPERTENSION, BENIGN: ICD-10-CM

## 2022-03-02 DIAGNOSIS — N30.00 ACUTE CYSTITIS WITHOUT HEMATURIA: Primary | ICD-10-CM

## 2022-03-02 PROCEDURE — G8417 CALC BMI ABV UP PARAM F/U: HCPCS | Performed by: FAMILY MEDICINE

## 2022-03-02 PROCEDURE — 3017F COLORECTAL CA SCREEN DOC REV: CPT | Performed by: FAMILY MEDICINE

## 2022-03-02 PROCEDURE — 1123F ACP DISCUSS/DSCN MKR DOCD: CPT | Performed by: FAMILY MEDICINE

## 2022-03-02 PROCEDURE — 99213 OFFICE O/P EST LOW 20 MIN: CPT | Performed by: FAMILY MEDICINE

## 2022-03-02 PROCEDURE — G8399 PT W/DXA RESULTS DOCUMENT: HCPCS | Performed by: FAMILY MEDICINE

## 2022-03-02 PROCEDURE — G8484 FLU IMMUNIZE NO ADMIN: HCPCS | Performed by: FAMILY MEDICINE

## 2022-03-02 PROCEDURE — 4040F PNEUMOC VAC/ADMIN/RCVD: CPT | Performed by: FAMILY MEDICINE

## 2022-03-02 PROCEDURE — 1090F PRES/ABSN URINE INCON ASSESS: CPT | Performed by: FAMILY MEDICINE

## 2022-03-02 PROCEDURE — 1036F TOBACCO NON-USER: CPT | Performed by: FAMILY MEDICINE

## 2022-03-02 PROCEDURE — G9708 BILAT MAST/HX BI /UNILAT MAS: HCPCS | Performed by: FAMILY MEDICINE

## 2022-03-02 PROCEDURE — G8427 DOCREV CUR MEDS BY ELIG CLIN: HCPCS | Performed by: FAMILY MEDICINE

## 2022-03-02 NOTE — PROGRESS NOTES
Chief Complaint   Patient presents with    Hypertension    1 Month Follow-Up       HPI: Willis De  presents for evaluation and management of hypertension and cystitis. Type notes she is taking tolerating her blood pressure medicine without lightheadedness or dizziness. She also reports that it took about a week following her last bout of cystitis to start feeling better but her symptoms of bloating fullness frequency and urgency have abated. We had prescribed some estrogen cream but she did not take it secondary to her history of breast cancer. She has had differing opinions from her oncologist and her gynecologist about the safety of this medication. She notes she is using cranberry concentrate at night. Really does not like the taste of it but is starting to get used to it. Review of Systems    No Known Allergies  New Prescriptions    No medications on file     Current Outpatient Medications   Medication Sig Dispense Refill    lisinopril (PRINIVIL;ZESTRIL) 20 MG tablet TAKE ONE TABLET BY MOUTH DAILY 90 tablet 1    venlafaxine (EFFEXOR XR) 75 MG extended release capsule TAKE ONE CAPSULE BY MOUTH DAILY 90 capsule 2    amLODIPine (NORVASC) 5 MG tablet TAKE ONE TABLET BY MOUTH DAILY 90 tablet 2    metoprolol tartrate (LOPRESSOR) 25 MG tablet TAKE ONE TABLET BY MOUTH TWICE A  tablet 2    simvastatin (ZOCOR) 20 MG tablet TAKE ONE TABLET BY MOUTH DAILY 90 tablet 1    Cholecalciferol (VITAMIN D3) 2000 units CAPS Take 2,000 Units by mouth daily      Probiotic Product (PROBIOTIC DAILY PO) Take by mouth      fluticasone (FLONASE) 50 MCG/ACT nasal spray INSTILL 1 SPRAY INTO THE NOSTRILS DAILY 1 Bottle 0    Omega-3 Fatty Acids (FISH OIL PO) Take  by mouth.  Multiple Vitamins-Minerals (THERAPEUTIC MULTIVITAMIN-MINERALS) tablet Take 1 tablet by mouth daily.  Ascorbic Acid (VITAMIN C) 500 MG tablet Take 500 mg by mouth daily.        No current facility-administered medications for this visit. Past Medical History:   Diagnosis Date    Allergic rhinitis     Basal cell cancer May 2014    Forehead: Dr. Ileana Bourne BRCA positive     Depressive disorder     Hearing loss     HTN (hypertension)     Hx of bilateral mastectomy 7/27/2015    Hypercholesterolemia     Malignant neoplasm of female breast [facility])     s/p B Mastectomy - Dr. Gavin Isaacs 2004    Mild aortic sclerosis 01/17/2019    ECHO 01/17/19    Non-thrombocytopenic purpura ([address]     Osteopenia     DXA 10/12/11 Improving densitiy L hip    Skin lesion     4mm lesion L nose- dr. Cordero [place] - (benign)    Stress incontinence 10/5/2012    Trace aortic valve regurgitation 01/17/2019    ECHO 01/17/19    Vertigo          Objective   /66 (Site: Left Upper Arm, Position: Sitting, Cuff Size: Medium Adult)   Pulse 67   Temp 96 °F (35.6 °C) (Temporal)   Ht 5' 6\" (1.676 m)   Wt 163 lb (73.9 kg)   BMI 26.31 kg/m²   Wt Readings from Last 3 Encounters:   03/02/22 163 lb (73.9 kg)   01/24/22 164 lb 3.2 oz (74.5 kg)   07/26/21 169 lb (76.7 kg)       Physical Exam  Constitutional:       Appearance: She is well-developed. Cardiovascular:      Rate and Rhythm: Normal rate and regular rhythm. Pulses:           Dorsalis pedis pulses are 2+ on the right side and 2+ on the left side. Posterior tibial pulses are 2+ on the right side and 2+ on the left side. Heart sounds: No murmur heard. No friction rub. No gallop. Comments: No Lower Extremity Edema  Pulmonary:      Effort: Pulmonary effort is normal.      Breath sounds: Normal breath sounds. No wheezing or rales. Abdominal:      General: Bowel sounds are normal. There is no distension. Palpations: Abdomen is soft. There is no mass. Tenderness: There is no abdominal tenderness. Skin:     General: Skin is warm and dry. Findings: No rash.            Chemistry        Component Value Date/Time     01/27/2022 1042    K 4.9 01/27/2022 1042     01/27/2022 1042    CO2 30 01/27/2022 1042    BUN 13 01/27/2022 1042    CREATININE 0.8 01/27/2022 1042        Component Value Date/Time    CALCIUM 9.8 01/27/2022 1042    ALKPHOS 101 01/27/2022 1042    AST 25 01/27/2022 1042    ALT 21 01/27/2022 1042    BILITOT 0.4 01/27/2022 1042          Lab Results   Component Value Date    WBC 6.4 06/08/2020    HGB 13.7 06/08/2020    HCT 41.5 06/08/2020    MCV 96.8 06/08/2020     06/08/2020     Lab Results   Component Value Date    LABA1C 5.2 07/26/2021     No results found for: EAG  Lab Results   Component Value Date    LABA1C 5.2 07/26/2021     No components found for: CHLPL  Lab Results   Component Value Date    TRIG 86 01/27/2022    TRIG 121 11/16/2020    TRIG 139 11/04/2019     Lab Results   Component Value Date    HDL 90 (H) 01/27/2022    HDL 76 (H) 11/16/2020     (H) 11/04/2019     Lab Results   Component Value Date    LDLCALC 93 01/27/2022    LDLCALC 116 (H) 11/16/2020    LDLCALC 105 (H) 11/04/2019     Lab Results   Component Value Date    LABVLDL 17 01/27/2022    LABVLDL 24 11/16/2020    LABVLDL 28 11/04/2019         Assessment   Plan   1. Acute cystitis without hematuria  I counseled Doroteo Lu that she could switch to lemon juice instead of cranberry juice if desired as our goal is simply to acidify the urine. . We will monitor her response and treat if she has recurrence. 2. Essential hypertension, benign  Controlled: Appears stable. We will continue current management and monitor for adverse reaction and disease progression. Follow-up as noted below      Discussed use, benefit, and side effects of prescribed medications. Barriers to medication compliance addressed. All patient questions answered. Pt voiced understanding. RTC Return in about 5 months (around 8/2/2022).

## 2022-03-24 RX ORDER — SIMVASTATIN 20 MG
TABLET ORAL
Qty: 90 TABLET | Refills: 1 | Status: SHIPPED | OUTPATIENT
Start: 2022-03-24 | End: 2022-09-20

## 2022-03-24 NOTE — TELEPHONE ENCOUNTER
Medication:   Requested Prescriptions     Pending Prescriptions Disp Refills    simvastatin (ZOCOR) 20 MG tablet 90 tablet 1        Last Filled:  09/27/21    Patient Phone Number: [phone] (home) [phone] (work)    Last appt: 3/2/2022   Next appt: 7/25/2022    Last OARRS: No flowsheet data found.

## 2022-06-09 DIAGNOSIS — I10 ESSENTIAL HYPERTENSION: ICD-10-CM

## 2022-06-09 RX ORDER — LISINOPRIL 20 MG/1
TABLET ORAL
Qty: 90 TABLET | Refills: 1 | Status: SHIPPED | OUTPATIENT
Start: 2022-06-09

## 2022-06-09 NOTE — TELEPHONE ENCOUNTER
Medication:   Requested Prescriptions     Pending Prescriptions Disp Refills    lisinopril (PRINIVIL;ZESTRIL) 20 MG tablet [Pharmacy Med Name: LISINOPRIL 20 MG TABLET] 90 tablet 1     Sig: TAKE ONE TABLET BY MOUTH DAILY        Last Filled:      Patient Phone Number: [phone] (home) [phone] (work)    Last appt: 3/2/2022   Next appt: 7/25/2022  Return in about 5 months (around 8/2/2022). Last OARRS: No flowsheet data found.

## 2022-07-14 ENCOUNTER — PATIENT MESSAGE (OUTPATIENT)
Dept: PRIMARY CARE CLINIC | Age: 72
End: 2022-07-14

## 2022-07-14 NOTE — TELEPHONE ENCOUNTER
From: Caroline Pruitt  To: Dr. Renetta Mcgowan: 7/14/2022 2:15 PM EDT  Subject: Covid testing    Hi Dr. Eliana Borden. Ari Racer tested positive for Covid on Tuesday. He has upper respiratory symptoms and body aches. I tested negative and have been testing every day. Do I need to test myself everyday to see if I am still negative? Thank you.      Magdalene Calvillo

## 2022-07-25 ENCOUNTER — OFFICE VISIT (OUTPATIENT)
Dept: PRIMARY CARE CLINIC | Age: 72
End: 2022-07-25
Payer: MEDICARE

## 2022-07-25 VITALS
BODY MASS INDEX: 26.18 KG/M2 | HEART RATE: 67 BPM | DIASTOLIC BLOOD PRESSURE: 54 MMHG | OXYGEN SATURATION: 96 % | SYSTOLIC BLOOD PRESSURE: 116 MMHG | WEIGHT: 162.2 LBS | TEMPERATURE: 97.1 F

## 2022-07-25 DIAGNOSIS — E78.00 HYPERCHOLESTEROLEMIA: ICD-10-CM

## 2022-07-25 DIAGNOSIS — F32.A DEPRESSIVE DISORDER: ICD-10-CM

## 2022-07-25 DIAGNOSIS — F32.5 MAJOR DEPRESSION IN REMISSION (HCC): ICD-10-CM

## 2022-07-25 DIAGNOSIS — I10 ESSENTIAL HYPERTENSION: Primary | ICD-10-CM

## 2022-07-25 DIAGNOSIS — N39.0 RECURRENT UTI: ICD-10-CM

## 2022-07-25 PROCEDURE — 99214 OFFICE O/P EST MOD 30 MIN: CPT | Performed by: FAMILY MEDICINE

## 2022-07-25 PROCEDURE — 1123F ACP DISCUSS/DSCN MKR DOCD: CPT | Performed by: FAMILY MEDICINE

## 2022-07-25 RX ORDER — AMLODIPINE BESYLATE 5 MG/1
TABLET ORAL
Qty: 90 TABLET | Refills: 3 | Status: SHIPPED | OUTPATIENT
Start: 2022-07-25

## 2022-07-25 RX ORDER — VENLAFAXINE HYDROCHLORIDE 75 MG/1
CAPSULE, EXTENDED RELEASE ORAL
Qty: 90 CAPSULE | Refills: 3 | Status: SHIPPED | OUTPATIENT
Start: 2022-07-25

## 2022-07-25 SDOH — ECONOMIC STABILITY: FOOD INSECURITY: WITHIN THE PAST 12 MONTHS, YOU WORRIED THAT YOUR FOOD WOULD RUN OUT BEFORE YOU GOT MONEY TO BUY MORE.: NEVER TRUE

## 2022-07-25 SDOH — ECONOMIC STABILITY: FOOD INSECURITY: WITHIN THE PAST 12 MONTHS, THE FOOD YOU BOUGHT JUST DIDN'T LAST AND YOU DIDN'T HAVE MONEY TO GET MORE.: NEVER TRUE

## 2022-07-25 ASSESSMENT — PATIENT HEALTH QUESTIONNAIRE - PHQ9
SUM OF ALL RESPONSES TO PHQ QUESTIONS 1-9: 0
8. MOVING OR SPEAKING SO SLOWLY THAT OTHER PEOPLE COULD HAVE NOTICED. OR THE OPPOSITE, BEING SO FIGETY OR RESTLESS THAT YOU HAVE BEEN MOVING AROUND A LOT MORE THAN USUAL: 0
4. FEELING TIRED OR HAVING LITTLE ENERGY: 0
3. TROUBLE FALLING OR STAYING ASLEEP: 0
SUM OF ALL RESPONSES TO PHQ QUESTIONS 1-9: 0
SUM OF ALL RESPONSES TO PHQ QUESTIONS 1-9: 0
1. LITTLE INTEREST OR PLEASURE IN DOING THINGS: 0
SUM OF ALL RESPONSES TO PHQ9 QUESTIONS 1 & 2: 0
6. FEELING BAD ABOUT YOURSELF - OR THAT YOU ARE A FAILURE OR HAVE LET YOURSELF OR YOUR FAMILY DOWN: 0
2. FEELING DOWN, DEPRESSED OR HOPELESS: 0
9. THOUGHTS THAT YOU WOULD BE BETTER OFF DEAD, OR OF HURTING YOURSELF: 0
10. IF YOU CHECKED OFF ANY PROBLEMS, HOW DIFFICULT HAVE THESE PROBLEMS MADE IT FOR YOU TO DO YOUR WORK, TAKE CARE OF THINGS AT HOME, OR GET ALONG WITH OTHER PEOPLE: 0
SUM OF ALL RESPONSES TO PHQ QUESTIONS 1-9: 0
5. POOR APPETITE OR OVEREATING: 0
7. TROUBLE CONCENTRATING ON THINGS, SUCH AS READING THE NEWSPAPER OR WATCHING TELEVISION: 0

## 2022-07-25 ASSESSMENT — SOCIAL DETERMINANTS OF HEALTH (SDOH): HOW HARD IS IT FOR YOU TO PAY FOR THE VERY BASICS LIKE FOOD, HOUSING, MEDICAL CARE, AND HEATING?: NOT HARD AT ALL

## 2022-07-25 NOTE — PROGRESS NOTES
Chief Complaint   Patient presents with    Hypertension    Cholesterol Problem           Depression       HPI: Cassandra Bailey  presents for evaluation and management of multiple medical problems. She notes she is taking tolerating her blood pressure medicine without dizziness or lightheadedness. .  She does states she gets a little lightheaded if she does suppose in Silver sneakers where she tilts her head back and looks up at the ceiling. She notes she is taking tolerating her cholesterol medicine without abdominal pain or muscle aches. She reports she is using a probiotic and as needed cranberry juice when she gets a little symptomatic with urinary symptoms for recurrent UTI. Notes no pain with urination or back pain or fever or chills. She reports her mood is good on the Effexor. Notes she would like to stay on it.   Today's PHQ:    PHQ Scores 7/25/2022 1/24/2022 7/26/2021 6/15/2021 11/16/2020 6/8/2020 6/7/2018   PHQ2 Score 0 1 0 1 1 0 0   PHQ9 Score 0 2 1 2 2 0 0     Interpretation of Total Score Depression Severity: 1-4 = Minimal depression, 5-9 = Mild depression, 10-14 = Moderate depression, 15-19 = Moderately severe depression, 20-27 = Severe depression        Review of Systems    No Known Allergies  New Prescriptions    No medications on file     Current Outpatient Medications   Medication Sig Dispense Refill    venlafaxine (EFFEXOR XR) 75 MG extended release capsule TAKE ONE CAPSULE BY MOUTH DAILY 90 capsule 3    amLODIPine (NORVASC) 5 MG tablet TAKE ONE TABLET BY MOUTH DAILY 90 tablet 3    metoprolol tartrate (LOPRESSOR) 25 MG tablet TAKE ONE TABLET BY MOUTH TWICE A  tablet 3    lisinopril (PRINIVIL;ZESTRIL) 20 MG tablet TAKE ONE TABLET BY MOUTH DAILY 90 tablet 1    simvastatin (ZOCOR) 20 MG tablet TAKE ONE TABLET BY MOUTH DAILY 90 tablet 1    Cholecalciferol (VITAMIN D3) 2000 units CAPS Take 2,000 Units by mouth daily      Probiotic Product (PROBIOTIC DAILY PO) Take by mouth      fluticasone (FLONASE) 50 MCG/ACT nasal spray INSTILL 1 SPRAY INTO THE NOSTRILS DAILY 1 Bottle 0    Omega-3 Fatty Acids (FISH OIL PO) Take  by mouth. Multiple Vitamins-Minerals (THERAPEUTIC MULTIVITAMIN-MINERALS) tablet Take 1 tablet by mouth daily. Ascorbic Acid (VITAMIN C) 500 MG tablet Take 500 mg by mouth daily. No current facility-administered medications for this visit. Past Medical History:   Diagnosis Date    Allergic rhinitis     Basal cell cancer May 2014    Forehead: Dr. Mona Pelayo    BRCA positive     Depressive disorder     Hearing loss     HTN (hypertension)     Hx of bilateral mastectomy 7/27/2015    Hypercholesterolemia     Malignant neoplasm of female breast [facility])     s/p B Mastectomy - Dr. Fdiel Flynn 2004    Mild aortic sclerosis 01/17/2019    ECHO 01/17/19    Non-thrombocytopenic purpura ([address]     Osteopenia     DXA 10/12/11 Improving densitiy L hip    Skin lesion     4mm lesion L nose- dr. Mona Pelayo - (benign)    Stress incontinence 10/5/2012    Trace aortic valve regurgitation 01/17/2019    ECHO 01/17/19    Vertigo          Objective   BP (!) 116/54   Pulse 67   Temp 97.1 °F (36.2 °C)   Wt 162 lb 3.2 oz (73.6 kg)   SpO2 96%   BMI 26.18 kg/m²   Wt Readings from Last 3 Encounters:   07/25/22 162 lb 3.2 oz (73.6 kg)   03/02/22 163 lb (73.9 kg)   01/24/22 164 lb 3.2 oz (74.5 kg)       Physical Exam  Constitutional:       Appearance: She is well-developed. Cardiovascular:      Rate and Rhythm: Normal rate and regular rhythm. Pulses:           Dorsalis pedis pulses are 2+ on the right side and 2+ on the left side. Posterior tibial pulses are 2+ on the right side and 2+ on the left side. Heart sounds: No murmur heard. No friction rub. No gallop. Comments: No Lower Extremity Edema  Pulmonary:      Effort: Pulmonary effort is normal.      Breath sounds: Normal breath sounds. No wheezing or rales. Abdominal:      General: Bowel sounds are normal. There is no distension. Palpations: Abdomen is soft. There is no mass. Tenderness: There is no abdominal tenderness. Skin:     General: Skin is warm and dry. Findings: No rash. Chemistry        Component Value Date/Time     01/27/2022 1042    K 4.9 01/27/2022 1042     01/27/2022 1042    CO2 30 01/27/2022 1042    BUN 13 01/27/2022 1042    CREATININE 0.8 01/27/2022 1042        Component Value Date/Time    CALCIUM 9.8 01/27/2022 1042    ALKPHOS 101 01/27/2022 1042    AST 25 01/27/2022 1042    ALT 21 01/27/2022 1042    BILITOT 0.4 01/27/2022 1042          Lab Results   Component Value Date    WBC 6.4 06/08/2020    HGB 13.7 06/08/2020    HCT 41.5 06/08/2020    MCV 96.8 06/08/2020     06/08/2020     Lab Results   Component Value Date    LABA1C 5.2 07/26/2021     No results found for: EAG  Lab Results   Component Value Date    LABA1C 5.2 07/26/2021     No components found for: CHLPL  Lab Results   Component Value Date    TRIG 86 01/27/2022    TRIG 121 11/16/2020    TRIG 139 11/04/2019     Lab Results   Component Value Date    HDL 90 (H) 01/27/2022    HDL 76 (H) 11/16/2020     (H) 11/04/2019     Lab Results   Component Value Date    LDLCALC 93 01/27/2022    LDLCALC 116 (H) 11/16/2020    LDLCALC 105 (H) 11/04/2019     Lab Results   Component Value Date    LABVLDL 17 01/27/2022    LABVLDL 24 11/16/2020    LABVLDL 28 11/04/2019         Assessment   Plan   1. Essential hypertension  Controlled: Appears stable. We will continue current management and monitor for adverse reaction and disease progression. She is feeling well so we will hold off on testing for renal function and recheck in 6 months with labs at that  Follow-up as noted below    - amLODIPine (NORVASC) 5 MG tablet; TAKE ONE TABLET BY MOUTH DAILY  Dispense: 90 tablet; Refill: 3  - metoprolol tartrate (LOPRESSOR) 25 MG tablet; TAKE ONE TABLET BY MOUTH TWICE A DAY  Dispense: 180 tablet; Refill: 3    2.  Hypercholesterolemia  Controlled: Appears stable. We will continue current management and monitor for adverse reaction and disease progression. Follow-up as noted below      3. Major depression in remission ([facility] [address]  Controlled: Appears stable. We will continue current management and monitor for adverse reaction and disease progression. Follow-up as noted below      4. Depressive disorder  As above  - venlafaxine (EFFEXOR XR) 75 MG extended release capsule; TAKE ONE CAPSULE BY MOUTH DAILY  Dispense: 90 capsule; Refill: 3    5. Recurrent UTI  Counseled patient to continue probiotic and cranberry juice as needed. Follow-up as needed        RTC Return in about 6 months (around 1/25/2023).

## 2022-09-20 RX ORDER — SIMVASTATIN 20 MG
TABLET ORAL
Qty: 90 TABLET | Refills: 1 | Status: SHIPPED | OUTPATIENT
Start: 2022-09-20

## 2022-09-20 NOTE — TELEPHONE ENCOUNTER
Medication:   Requested Prescriptions     Pending Prescriptions Disp Refills    simvastatin (ZOCOR) 20 MG tablet [Pharmacy Med Name: SIMVASTATIN 20 MG TABLET] 90 tablet 1     Sig: TAKE ONE TABLET BY MOUTH DAILY       Last Filled:      Patient Phone Number: [phone] (home) [phone] (work)    Last appt: 7/25/2022   Next appt: 1/23/2023  Return in about 6 months (around 1/25/2023).   Last PSA: No results found for: PSA

## 2022-11-10 SDOH — HEALTH STABILITY: PHYSICAL HEALTH: ON AVERAGE, HOW MANY MINUTES DO YOU ENGAGE IN EXERCISE AT THIS LEVEL?: 40 MIN

## 2022-11-10 SDOH — HEALTH STABILITY: PHYSICAL HEALTH: ON AVERAGE, HOW MANY DAYS PER WEEK DO YOU ENGAGE IN MODERATE TO STRENUOUS EXERCISE (LIKE A BRISK WALK)?: 4 DAYS

## 2022-11-10 ASSESSMENT — LIFESTYLE VARIABLES
HOW OFTEN DO YOU HAVE A DRINK CONTAINING ALCOHOL: 5
HAVE YOU OR SOMEONE ELSE BEEN INJURED AS A RESULT OF YOUR DRINKING: NO
HOW OFTEN DO YOU HAVE A DRINK CONTAINING ALCOHOL: 4 OR MORE TIMES A WEEK
HOW OFTEN DURING THE LAST YEAR HAVE YOU BEEN UNABLE TO REMEMBER WHAT HAPPENED THE NIGHT BEFORE BECAUSE YOU HAD BEEN DRINKING: 0
HOW OFTEN DURING THE LAST YEAR HAVE YOU FOUND THAT YOU WERE NOT ABLE TO STOP DRINKING ONCE YOU HAD STARTED: NEVER
HOW MANY STANDARD DRINKS CONTAINING ALCOHOL DO YOU HAVE ON A TYPICAL DAY: 1
HOW OFTEN DURING THE LAST YEAR HAVE YOU NEEDED AN ALCOHOLIC DRINK FIRST THING IN THE MORNING TO GET YOURSELF GOING AFTER A NIGHT OF HEAVY DRINKING: 0
HOW OFTEN DURING THE LAST YEAR HAVE YOU NEEDED AN ALCOHOLIC DRINK FIRST THING IN THE MORNING TO GET YOURSELF GOING AFTER A NIGHT OF HEAVY DRINKING: NEVER
HOW OFTEN DURING THE LAST YEAR HAVE YOU FAILED TO DO WHAT WAS NORMALLY EXPECTED FROM YOU BECAUSE OF DRINKING: 0
HAS A RELATIVE, FRIEND, DOCTOR, OR ANOTHER HEALTH PROFESSIONAL EXPRESSED CONCERN ABOUT YOUR DRINKING OR SUGGESTED YOU CUT DOWN: 0
HOW OFTEN DURING THE LAST YEAR HAVE YOU FOUND THAT YOU WERE NOT ABLE TO STOP DRINKING ONCE YOU HAD STARTED: 0
HOW OFTEN DURING THE LAST YEAR HAVE YOU HAD A FEELING OF GUILT OR REMORSE AFTER DRINKING: NEVER
HOW OFTEN DO YOU HAVE SIX OR MORE DRINKS ON ONE OCCASION: 1
HAVE YOU OR SOMEONE ELSE BEEN INJURED AS A RESULT OF YOUR DRINKING: 0
HAS A RELATIVE, FRIEND, DOCTOR, OR ANOTHER HEALTH PROFESSIONAL EXPRESSED CONCERN ABOUT YOUR DRINKING OR SUGGESTED YOU CUT DOWN: NO
HOW MANY STANDARD DRINKS CONTAINING ALCOHOL DO YOU HAVE ON A TYPICAL DAY: 1 OR 2
HOW OFTEN DURING THE LAST YEAR HAVE YOU BEEN UNABLE TO REMEMBER WHAT HAPPENED THE NIGHT BEFORE BECAUSE YOU HAD BEEN DRINKING: NEVER
HOW OFTEN DURING THE LAST YEAR HAVE YOU FAILED TO DO WHAT WAS NORMALLY EXPECTED FROM YOU BECAUSE OF DRINKING: NEVER
HOW OFTEN DURING THE LAST YEAR HAVE YOU HAD A FEELING OF GUILT OR REMORSE AFTER DRINKING: 0

## 2022-11-10 ASSESSMENT — PATIENT HEALTH QUESTIONNAIRE - PHQ9
SUM OF ALL RESPONSES TO PHQ QUESTIONS 1-9: 0
1. LITTLE INTEREST OR PLEASURE IN DOING THINGS: 0
2. FEELING DOWN, DEPRESSED OR HOPELESS: 0
SUM OF ALL RESPONSES TO PHQ QUESTIONS 1-9: 0
SUM OF ALL RESPONSES TO PHQ QUESTIONS 1-9: 0
SUM OF ALL RESPONSES TO PHQ9 QUESTIONS 1 & 2: 0
SUM OF ALL RESPONSES TO PHQ QUESTIONS 1-9: 0

## 2022-11-16 ENCOUNTER — OFFICE VISIT (OUTPATIENT)
Dept: PRIMARY CARE CLINIC | Age: 72
End: 2022-11-16
Payer: MEDICARE

## 2022-11-16 VITALS
HEIGHT: 66 IN | OXYGEN SATURATION: 97 % | WEIGHT: 162 LBS | TEMPERATURE: 97.2 F | BODY MASS INDEX: 26.03 KG/M2 | DIASTOLIC BLOOD PRESSURE: 67 MMHG | SYSTOLIC BLOOD PRESSURE: 128 MMHG | HEART RATE: 69 BPM

## 2022-11-16 DIAGNOSIS — Z00.00 MEDICARE ANNUAL WELLNESS VISIT, SUBSEQUENT: ICD-10-CM

## 2022-11-16 DIAGNOSIS — Z23 NEED FOR IMMUNIZATION AGAINST INFLUENZA: Primary | ICD-10-CM

## 2022-11-16 PROCEDURE — 3074F SYST BP LT 130 MM HG: CPT | Performed by: FAMILY MEDICINE

## 2022-11-16 PROCEDURE — 1123F ACP DISCUSS/DSCN MKR DOCD: CPT | Performed by: FAMILY MEDICINE

## 2022-11-16 PROCEDURE — G8484 FLU IMMUNIZE NO ADMIN: HCPCS | Performed by: FAMILY MEDICINE

## 2022-11-16 PROCEDURE — 3078F DIAST BP <80 MM HG: CPT | Performed by: FAMILY MEDICINE

## 2022-11-16 PROCEDURE — 90694 VACC AIIV4 NO PRSRV 0.5ML IM: CPT | Performed by: FAMILY MEDICINE

## 2022-11-16 PROCEDURE — G0008 ADMIN INFLUENZA VIRUS VAC: HCPCS | Performed by: FAMILY MEDICINE

## 2022-11-16 PROCEDURE — G0439 PPPS, SUBSEQ VISIT: HCPCS | Performed by: FAMILY MEDICINE

## 2022-11-16 PROCEDURE — 3017F COLORECTAL CA SCREEN DOC REV: CPT | Performed by: FAMILY MEDICINE

## 2022-11-16 ASSESSMENT — PATIENT HEALTH QUESTIONNAIRE - PHQ9
5. POOR APPETITE OR OVEREATING: 0
SUM OF ALL RESPONSES TO PHQ QUESTIONS 1-9: 0
SUM OF ALL RESPONSES TO PHQ QUESTIONS 1-9: 0
7. TROUBLE CONCENTRATING ON THINGS, SUCH AS READING THE NEWSPAPER OR WATCHING TELEVISION: 0
1. LITTLE INTEREST OR PLEASURE IN DOING THINGS: 0
SUM OF ALL RESPONSES TO PHQ QUESTIONS 1-9: 0
7. TROUBLE CONCENTRATING ON THINGS, SUCH AS READING THE NEWSPAPER OR WATCHING TELEVISION: 0
6. FEELING BAD ABOUT YOURSELF - OR THAT YOU ARE A FAILURE OR HAVE LET YOURSELF OR YOUR FAMILY DOWN: 0
SUM OF ALL RESPONSES TO PHQ QUESTIONS 1-9: 0
SUM OF ALL RESPONSES TO PHQ QUESTIONS 1-9: 0
8. MOVING OR SPEAKING SO SLOWLY THAT OTHER PEOPLE COULD HAVE NOTICED. OR THE OPPOSITE, BEING SO FIGETY OR RESTLESS THAT YOU HAVE BEEN MOVING AROUND A LOT MORE THAN USUAL: 0
4. FEELING TIRED OR HAVING LITTLE ENERGY: 0
SUM OF ALL RESPONSES TO PHQ QUESTIONS 1-9: 0
SUM OF ALL RESPONSES TO PHQ9 QUESTIONS 1 & 2: 0
5. POOR APPETITE OR OVEREATING: 0
4. FEELING TIRED OR HAVING LITTLE ENERGY: 0
2. FEELING DOWN, DEPRESSED OR HOPELESS: 0
2. FEELING DOWN, DEPRESSED OR HOPELESS: 0
9. THOUGHTS THAT YOU WOULD BE BETTER OFF DEAD, OR OF HURTING YOURSELF: 0
SUM OF ALL RESPONSES TO PHQ9 QUESTIONS 1 & 2: 0
SUM OF ALL RESPONSES TO PHQ QUESTIONS 1-9: 0
SUM OF ALL RESPONSES TO PHQ QUESTIONS 1-9: 0
3. TROUBLE FALLING OR STAYING ASLEEP: 0
6. FEELING BAD ABOUT YOURSELF - OR THAT YOU ARE A FAILURE OR HAVE LET YOURSELF OR YOUR FAMILY DOWN: 0
1. LITTLE INTEREST OR PLEASURE IN DOING THINGS: 0
10. IF YOU CHECKED OFF ANY PROBLEMS, HOW DIFFICULT HAVE THESE PROBLEMS MADE IT FOR YOU TO DO YOUR WORK, TAKE CARE OF THINGS AT HOME, OR GET ALONG WITH OTHER PEOPLE: 0
8. MOVING OR SPEAKING SO SLOWLY THAT OTHER PEOPLE COULD HAVE NOTICED. OR THE OPPOSITE, BEING SO FIGETY OR RESTLESS THAT YOU HAVE BEEN MOVING AROUND A LOT MORE THAN USUAL: 0
9. THOUGHTS THAT YOU WOULD BE BETTER OFF DEAD, OR OF HURTING YOURSELF: 0
3. TROUBLE FALLING OR STAYING ASLEEP: 0

## 2022-11-16 ASSESSMENT — LIFESTYLE VARIABLES
HOW OFTEN DO YOU HAVE A DRINK CONTAINING ALCOHOL: 2-4 TIMES A MONTH
HOW OFTEN DURING THE LAST YEAR HAVE YOU HAD A FEELING OF GUILT OR REMORSE AFTER DRINKING: 0
HOW MANY STANDARD DRINKS CONTAINING ALCOHOL DO YOU HAVE ON A TYPICAL DAY: 1 OR 2
HAS A RELATIVE, FRIEND, DOCTOR, OR ANOTHER HEALTH PROFESSIONAL EXPRESSED CONCERN ABOUT YOUR DRINKING OR SUGGESTED YOU CUT DOWN: 0
HOW MANY STANDARD DRINKS CONTAINING ALCOHOL DO YOU HAVE ON A TYPICAL DAY: 1 OR 2
HAS A RELATIVE, FRIEND, DOCTOR, OR ANOTHER HEALTH PROFESSIONAL EXPRESSED CONCERN ABOUT YOUR DRINKING OR SUGGESTED YOU CUT DOWN: 0
HAVE YOU OR SOMEONE ELSE BEEN INJURED AS A RESULT OF YOUR DRINKING: 0
HOW OFTEN DURING THE LAST YEAR HAVE YOU FOUND THAT YOU WERE NOT ABLE TO STOP DRINKING ONCE YOU HAD STARTED: 0
HOW OFTEN DURING THE LAST YEAR HAVE YOU BEEN UNABLE TO REMEMBER WHAT HAPPENED THE NIGHT BEFORE BECAUSE YOU HAD BEEN DRINKING: 0
HOW OFTEN DURING THE LAST YEAR HAVE YOU HAD A FEELING OF GUILT OR REMORSE AFTER DRINKING: 0
HOW OFTEN DO YOU HAVE A DRINK CONTAINING ALCOHOL: 2-3 TIMES A WEEK
HOW OFTEN DURING THE LAST YEAR HAVE YOU BEEN UNABLE TO REMEMBER WHAT HAPPENED THE NIGHT BEFORE BECAUSE YOU HAD BEEN DRINKING: 0
HOW OFTEN DURING THE LAST YEAR HAVE YOU FOUND THAT YOU WERE NOT ABLE TO STOP DRINKING ONCE YOU HAD STARTED: 0
HOW OFTEN DURING THE LAST YEAR HAVE YOU NEEDED AN ALCOHOLIC DRINK FIRST THING IN THE MORNING TO GET YOURSELF GOING AFTER A NIGHT OF HEAVY DRINKING: 0
HOW OFTEN DURING THE LAST YEAR HAVE YOU FAILED TO DO WHAT WAS NORMALLY EXPECTED FROM YOU BECAUSE OF DRINKING: 0
HOW OFTEN DURING THE LAST YEAR HAVE YOU NEEDED AN ALCOHOLIC DRINK FIRST THING IN THE MORNING TO GET YOURSELF GOING AFTER A NIGHT OF HEAVY DRINKING: 0
HOW OFTEN DURING THE LAST YEAR HAVE YOU FAILED TO DO WHAT WAS NORMALLY EXPECTED FROM YOU BECAUSE OF DRINKING: 0
HAVE YOU OR SOMEONE ELSE BEEN INJURED AS A RESULT OF YOUR DRINKING: 0

## 2022-11-16 NOTE — PATIENT INSTRUCTIONS
Personalized Preventive Plan for [facility] SERVICES - 11/16/2022  Medicare offers a range of preventive health benefits. Some of the tests and screenings are paid in full while other may be subject to a deductible, co-insurance, and/or copay. Some of these benefits include a comprehensive review of your medical history including lifestyle, illnesses that may run in your family, and various assessments and screenings as appropriate. After reviewing your medical record and screening and assessments performed today your provider may have ordered immunizations, labs, imaging, and/or referrals for you. A list of these orders (if applicable) as well as your Preventive Care list are included within your After Visit Summary for your review. Other Preventive Recommendations:    A preventive eye exam performed by an eye specialist is recommended every 1-2 years to screen for glaucoma; cataracts, macular degeneration, and other eye disorders. A preventive dental visit is recommended every 6 months. Try to get at least 150 minutes of exercise per week or 10,000 steps per day on a pedometer . Order or download the FREE \"Exercise & Physical Activity: Your Everyday Guide\" from [employer]. Call 1-613.215.8423 or search [employer] online. You need 5023-2414 mg of calcium and 0775-4779 IU of vitamin D per day. It is possible to meet your calcium requirement with diet alone, but a vitamin D supplement is usually necessary to meet this goal.  When exposed to the sun, use a sunscreen that protects against both UVA and UVB radiation with an SPF of 30 or greater. Reapply every 2 to 3 hours or after sweating, drying off with a towel, or swimming. Always wear a seat belt when traveling in a car. Always wear a helmet when riding a bicycle or motorcycle.

## 2022-11-16 NOTE — PROGRESS NOTES
Medicare Annual Wellness Visit    Sean Pineda is here for Medicare AWV    Assessment & Plan   Need for immunization against influenza  -     Influenza, FLUAD, (age 72 y+), IM, Preservative Free, 0.5 mL  Medicare annual wellness visit, subsequent    Recommendations for Preventive Services Due: see orders and patient instructions/AVS.  Recommended screening schedule for the next 5-10 years is provided to the patient in written form: see Patient Instructions/AVS.     No follow-ups on file. Subjective       Patient's complete Health Risk Assessment and screening values have been reviewed and are found in Flowsheets. The following problems were reviewed today and where indicated follow up appointments were made and/or referrals ordered. Positive Risk Factor Screenings with Interventions:             General Health and ACP:  General  In general, how would you say your health is?: Very Good  In the past 7 days, have you experienced any of the following: New or Increased Pain, New or Increased Fatigue, Loneliness, Social Isolation, Stress or Anger?: No  Do you get the social and emotional support that you need?: Yes  Do you have a Living Will?: (!) No    Advance Directives       Power of  Living Will ACP-Advance Directive ACP-Power of     Not on File Not on File Not on File Not on File          General Health Risk Interventions:  No Living Will: Advance Care Planning addressed with patient today              Objective   Vitals:    11/16/22 1127   BP: 128/67   Pulse: 69   Temp: 97.2 °F (36.2 °C)   SpO2: 97%   Weight: 162 lb (73.5 kg)   Height: 5' 6\" (1.676 m)      Body mass index is 26.15 kg/m². No Known Allergies  Prior to Visit Medications    Medication Sig Taking?  Authorizing Provider   simvastatin (ZOCOR) 20 MG tablet TAKE ONE TABLET BY MOUTH DAILY Yes Alejo Jon MD   venlafaxine (EFFEXOR XR) 75 MG extended release capsule TAKE ONE CAPSULE BY MOUTH DAILY Yes Ayaan Evans Beverly Chester MD   amLODIPine (NORVASC) 5 MG tablet TAKE ONE TABLET BY MOUTH DAILY Yes Laura Euceda MD   metoprolol tartrate (LOPRESSOR) 25 MG tablet TAKE ONE TABLET BY MOUTH TWICE A DAY Yes Laura Euceda MD   lisinopril (PRINIVIL;ZESTRIL) 20 MG tablet TAKE ONE TABLET BY MOUTH DAILY Yes Laura Euceda MD   Cholecalciferol (VITAMIN D3) 2000 units CAPS Take 2,000 Units by mouth daily Yes Historical Provider, MD   Probiotic Product (PROBIOTIC DAILY PO) Take by mouth Yes Historical Provider, MD   fluticasone (FLONASE) 50 MCG/ACT nasal spray INSTILL 1 SPRAY INTO THE NOSTRILS DAILY Yes Laura Euceda MD   Omega-3 Fatty Acids (FISH OIL PO) Take  by mouth. Yes Historical Provider, MD   Multiple Vitamins-Minerals (THERAPEUTIC MULTIVITAMIN-MINERALS) tablet Take 1 tablet by mouth daily. Yes Historical Provider, MD   Ascorbic Acid (VITAMIN C) 500 MG tablet Take 500 mg by mouth daily. Yes Historical Provider, MD [place] (Including outside providers/suppliers regularly involved in providing care):   Patient Care Team:  Laura Euceda MD as PCP - General (Family Medicine)  Miko Vargas MD as PCP - Hematology/Oncology (Hematology and Oncology)  Laura Euceda MD as PCP [facility]  Romi Dorsey MD as Surgeon (Plastic Surgery)     Reviewed and updated this visit:  Tobacco  Allergies  Meds  Med Hx  Surg Hx  Soc Hx  Fam Hx            I, Babatunde Clinton LPN, [id number], performed the documented evaluation under the direct supervision of the attending physician.

## 2022-12-10 DIAGNOSIS — I10 ESSENTIAL HYPERTENSION: ICD-10-CM

## 2022-12-12 RX ORDER — LISINOPRIL 20 MG/1
TABLET ORAL
Qty: 90 TABLET | Refills: 1 | Status: SHIPPED | OUTPATIENT
Start: 2022-12-12

## 2023-01-31 ENCOUNTER — HOSPITAL ENCOUNTER (OUTPATIENT)
Dept: GENERAL RADIOLOGY | Age: 73
Discharge: HOME | End: 2023-01-31
Payer: MEDICARE

## 2023-01-31 ENCOUNTER — OFFICE VISIT (OUTPATIENT)
Dept: PRIMARY CARE CLINIC | Age: 73
End: 2023-01-31
Payer: MEDICARE

## 2023-01-31 ENCOUNTER — HOSPITAL ENCOUNTER (OUTPATIENT)
Age: 73
Discharge: HOME | End: 2023-01-31
Payer: MEDICARE

## 2023-01-31 VITALS
DIASTOLIC BLOOD PRESSURE: 69 MMHG | WEIGHT: 162.4 LBS | SYSTOLIC BLOOD PRESSURE: 135 MMHG | BODY MASS INDEX: 26.1 KG/M2 | HEIGHT: 66 IN | HEART RATE: 74 BPM

## 2023-01-31 DIAGNOSIS — E78.00 HYPERCHOLESTEROLEMIA: ICD-10-CM

## 2023-01-31 DIAGNOSIS — M25.511 CHRONIC RIGHT SHOULDER PAIN: ICD-10-CM

## 2023-01-31 DIAGNOSIS — G89.29 CHRONIC RIGHT SHOULDER PAIN: ICD-10-CM

## 2023-01-31 DIAGNOSIS — I10 ESSENTIAL HYPERTENSION: Primary | ICD-10-CM

## 2023-01-31 DIAGNOSIS — F32.5 MAJOR DEPRESSION IN REMISSION (HCC): ICD-10-CM

## 2023-01-31 DIAGNOSIS — I10 ESSENTIAL HYPERTENSION: ICD-10-CM

## 2023-01-31 LAB
A/G RATIO: 1.6 (ref 1.1–2.2)
ALBUMIN SERPL-MCNC: 4.4 G/DL (ref 3.4–5)
ALP BLD-CCNC: 125 U/L (ref 40–129)
ALT SERPL-CCNC: 19 U/L (ref 10–40)
ANION GAP SERPL CALCULATED.3IONS-SCNC: 9 MMOL/L (ref 3–16)
AST SERPL-CCNC: 21 U/L (ref 15–37)
BILIRUB SERPL-MCNC: 0.3 MG/DL (ref 0–1)
BUN BLDV-MCNC: 15 MG/DL (ref 7–20)
CALCIUM SERPL-MCNC: 10.2 MG/DL (ref 8.3–10.6)
CHLORIDE BLD-SCNC: 99 MMOL/L (ref 99–110)
CHOLESTEROL, TOTAL: 200 MG/DL (ref 0–199)
CO2: 32 MMOL/L (ref 21–32)
CREAT SERPL-MCNC: 0.8 MG/DL (ref 0.6–1.2)
GFR SERPL CREATININE-BSD FRML MDRD: >60 ML/MIN/{1.73_M2}
GLUCOSE BLD-MCNC: 99 MG/DL (ref 70–99)
HDLC SERPL-MCNC: 85 MG/DL (ref 40–60)
LDL CHOLESTEROL CALCULATED: 89 MG/DL
POTASSIUM SERPL-SCNC: 4.9 MMOL/L (ref 3.5–5.1)
SODIUM BLD-SCNC: 140 MMOL/L (ref 136–145)
TOTAL PROTEIN: 7.2 G/DL (ref 6.4–8.2)
TRIGL SERPL-MCNC: 131 MG/DL (ref 0–150)
VLDLC SERPL CALC-MCNC: 26 MG/DL

## 2023-01-31 PROCEDURE — 1090F PRES/ABSN URINE INCON ASSESS: CPT | Performed by: FAMILY MEDICINE

## 2023-01-31 PROCEDURE — 1036F TOBACCO NON-USER: CPT | Performed by: FAMILY MEDICINE

## 2023-01-31 PROCEDURE — 1123F ACP DISCUSS/DSCN MKR DOCD: CPT | Performed by: FAMILY MEDICINE

## 2023-01-31 PROCEDURE — G9708 BILAT MAST/HX BI /UNILAT MAS: HCPCS | Performed by: FAMILY MEDICINE

## 2023-01-31 PROCEDURE — G8417 CALC BMI ABV UP PARAM F/U: HCPCS | Performed by: FAMILY MEDICINE

## 2023-01-31 PROCEDURE — 71046 X-RAY EXAM CHEST 2 VIEWS: CPT

## 2023-01-31 PROCEDURE — G8427 DOCREV CUR MEDS BY ELIG CLIN: HCPCS | Performed by: FAMILY MEDICINE

## 2023-01-31 PROCEDURE — 3017F COLORECTAL CA SCREEN DOC REV: CPT | Performed by: FAMILY MEDICINE

## 2023-01-31 PROCEDURE — G8484 FLU IMMUNIZE NO ADMIN: HCPCS | Performed by: FAMILY MEDICINE

## 2023-01-31 PROCEDURE — 3078F DIAST BP <80 MM HG: CPT | Performed by: FAMILY MEDICINE

## 2023-01-31 PROCEDURE — G8399 PT W/DXA RESULTS DOCUMENT: HCPCS | Performed by: FAMILY MEDICINE

## 2023-01-31 PROCEDURE — 99214 OFFICE O/P EST MOD 30 MIN: CPT | Performed by: FAMILY MEDICINE

## 2023-01-31 PROCEDURE — 3075F SYST BP GE 130 - 139MM HG: CPT | Performed by: FAMILY MEDICINE

## 2023-01-31 NOTE — RESULT ENCOUNTER NOTE
Good Morning Lisset Horton ,    Thank you for getting your chest x-ray. Your results are back and they look okay.     Dr. Rosita Quintana      For your convenience I have listed your future appointment(s) below:  Future Appointments  2/28/2023  9:00 AM    MD ADRIANA Maxwell  10/24/2023 10:30 AM   SCHEDULE, LUMA RAMOS  11/16/2023 11:30 AM   SCHEDULE, LUMA RAMOS

## 2023-01-31 NOTE — RESULT ENCOUNTER NOTE
Good Afternoon Steve West ,    Thanks for coming in and seeing me today. Your results are back and they look fantastic! Keep up the good work.       Have a great week,    Dr. Silvio Nuñez      For your convenience I have listed your future appointment(s) below:  Future Appointments  2/28/2023  9:00 AM    MD ADRIANA Mcmanus  10/24/2023 10:30 AM   SCHEDULE, LUMA RAMOS  11/16/2023 11:30 AM   SCHEDULE, LUMA RAMOS

## 2023-01-31 NOTE — PROGRESS NOTES
Chief Complaint   Patient presents with    Depression    Hypertension    Hyperlipidemia       HPI: John Gee  presents for evaluation and management of follow-up on chronic medical problems and right shoulder pain. She notes a 3-month history of intermittent right shoulder pain. States it comes and goes and last for about a week at a time. States it does not hurt to move her shoulder but she has pain when she takes a deep breath radiating up into her neck. Notes no shortness of breath or chest pain. Denies injury other than she was sleeping in a camper and slung her arm against a wall about 3 months ago. States it hurts to sleep on that side as well. She reports her mood is good and her PHQ 9 was 0 today. She likes the Effexor and does not want to come off it. She is taking and tolerating her blood pressure medicine without lightheadedness or dizziness. She is taking and tolerating her cholesterol medicine without abdominal pain or myalgia           Review of Systems    No Known Allergies  New Prescriptions    No medications on file     Current Outpatient Medications   Medication Sig Dispense Refill    lisinopril (PRINIVIL;ZESTRIL) 20 MG tablet TAKE ONE TABLET BY MOUTH DAILY 90 tablet 1    simvastatin (ZOCOR) 20 MG tablet TAKE ONE TABLET BY MOUTH DAILY 90 tablet 1    venlafaxine (EFFEXOR XR) 75 MG extended release capsule TAKE ONE CAPSULE BY MOUTH DAILY 90 capsule 3    amLODIPine (NORVASC) 5 MG tablet TAKE ONE TABLET BY MOUTH DAILY 90 tablet 3    metoprolol tartrate (LOPRESSOR) 25 MG tablet TAKE ONE TABLET BY MOUTH TWICE A  tablet 3    Cholecalciferol (VITAMIN D3) 2000 units CAPS Take 2,000 Units by mouth daily      Probiotic Product (PROBIOTIC DAILY PO) Take by mouth      fluticasone (FLONASE) 50 MCG/ACT nasal spray INSTILL 1 SPRAY INTO THE NOSTRILS DAILY 1 Bottle 0    Omega-3 Fatty Acids (FISH OIL PO) Take  by mouth.       Multiple Vitamins-Minerals (THERAPEUTIC MULTIVITAMIN-MINERALS) tablet Take 1 tablet by mouth daily. Ascorbic Acid (VITAMIN C) 500 MG tablet Take 500 mg by mouth daily. No current facility-administered medications for this visit. Past Medical History:   Diagnosis Date    Allergic rhinitis     Basal cell cancer May 2014    Forehead: Dr. Roly Sandoval    BRCA positive     Depressive disorder     Hearing loss     HTN (hypertension)     Hx of bilateral mastectomy 7/27/2015    Hypercholesterolemia     Malignant neoplasm of female breast [facility])     s/p B Mastectomy - Dr. Abhi Anderson 2004    Mild aortic sclerosis 01/17/2019    ECHO 01/17/19    Non-thrombocytopenic purpura ([address]     Osteopenia     DXA 10/12/11 Improving densitiy L hip    Skin lesion     4mm lesion L nose- dr. Roly Sandoval - (benign)    Stress incontinence 10/5/2012    Trace aortic valve regurgitation 01/17/2019    ECHO 01/17/19    Vertigo          Objective   /69   Pulse 74   Ht 5' 6\" (1.676 m)   Wt 162 lb 6.4 oz (73.7 kg)   BMI 26.21 kg/m²   Wt Readings from Last 3 Encounters:   01/31/23 162 lb 6.4 oz (73.7 kg)   11/16/22 162 lb (73.5 kg)   07/25/22 162 lb 3.2 oz (73.6 kg)       Physical Exam  Constitutional:       Appearance: She is well-developed. Cardiovascular:      Rate and Rhythm: Normal rate and regular rhythm. Pulses:           Dorsalis pedis pulses are 2+ on the right side and 2+ on the left side. Posterior tibial pulses are 2+ on the right side and 2+ on the left side. Heart sounds: No murmur heard. No friction rub. No gallop. Comments: No Lower Extremity Edema  Pulmonary:      Effort: Pulmonary effort is normal.      Breath sounds: Normal breath sounds. No wheezing or rales. Abdominal:      General: Bowel sounds are normal. There is no distension. Palpations: Abdomen is soft. There is no mass. Tenderness: There is no abdominal tenderness. Musculoskeletal:      Comments: Full range of motion of bilateral shoulders.   No pain on internal or external rotation against resistance.  There is some slight right upper quadrant tenderness to deep palpation.  There is no bony tenderness of shoulder or acromioclavicular joint   Skin:     General: Skin is warm and dry.      Findings: No rash.   Psychiatric:         Mood and Affect: Mood normal.         Behavior: Behavior normal.         Thought Content: Thought content normal.         Chemistry        Component Value Date/Time     01/27/2022 1042    K 4.9 01/27/2022 1042     01/27/2022 1042    CO2 30 01/27/2022 1042    BUN 13 01/27/2022 1042    CREATININE 0.8 01/27/2022 1042        Component Value Date/Time    CALCIUM 9.8 01/27/2022 1042    ALKPHOS 101 01/27/2022 1042    AST 25 01/27/2022 1042    ALT 21 01/27/2022 1042    BILITOT 0.4 01/27/2022 1042          Lab Results   Component Value Date    WBC 6.4 06/08/2020    HGB 13.7 06/08/2020    HCT 41.5 06/08/2020    MCV 96.8 06/08/2020     06/08/2020     Lab Results   Component Value Date    LABA1C 5.2 07/26/2021     No results found for: EAG  Lab Results   Component Value Date    LABA1C 5.2 07/26/2021     No components found for: CHLPL  Lab Results   Component Value Date    TRIG 86 01/27/2022    TRIG 121 11/16/2020    TRIG 139 11/04/2019     Lab Results   Component Value Date    HDL 90 (H) 01/27/2022    HDL 76 (H) 11/16/2020     (H) 11/04/2019     Lab Results   Component Value Date    LDLCALC 93 01/27/2022    LDLCALC 116 (H) 11/16/2020    LDLCALC 105 (H) 11/04/2019     Lab Results   Component Value Date    LABVLDL 17 01/27/2022    LABVLDL 24 11/16/2020    LABVLDL 28 11/04/2019         Assessment   Plan   1. Essential hypertension  Controlled: Appears stable.  We will continue current management and monitor for adverse reaction and disease progression.  Follow-up as noted below    - Comprehensive Metabolic Panel; Future    2. 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  We will continue current management and monitor for adverse reaction and  disease progression. Follow-up as noted below      3. Hypercholesterolemia  Controlled: Appears stable. We will continue current management and monitor for adverse reaction and disease progression. Follow-up as noted below    - Lipid Panel; Future    4. Chronic right shoulder pain  Uncertain etiology. We will check a chest x-ray. Differential diagnosis includes pneumothorax, right shoulder arthritis, cervical arthritis or impinged nerve root, gallbladder issues, referred pain from diaphragm, in addition to musculoskeletal issues of the right rotator cuff. We will give her home exercise program and check x-rays and preliminary labs and see her back in a month. - XR CHEST (2 VW); Future        RTC Return in about 1 month (around 2/28/2023).

## 2023-02-07 ENCOUNTER — APPOINTMENT (OUTPATIENT)
Dept: GENERAL RADIOLOGY | Age: 73
End: 2023-02-07
Payer: MEDICARE

## 2023-02-07 ENCOUNTER — HOSPITAL ENCOUNTER (EMERGENCY)
Age: 73
Discharge: HOME | End: 2023-02-07
Attending: EMERGENCY MEDICINE
Payer: MEDICARE

## 2023-02-07 ENCOUNTER — APPOINTMENT (OUTPATIENT)
Dept: CT IMAGING | Age: 73
End: 2023-02-07
Payer: MEDICARE

## 2023-02-07 ENCOUNTER — TELEPHONE (OUTPATIENT)
Dept: PRIMARY CARE CLINIC | Age: 73
End: 2023-02-07

## 2023-02-07 VITALS
DIASTOLIC BLOOD PRESSURE: 64 MMHG | BODY MASS INDEX: 26.33 KG/M2 | OXYGEN SATURATION: 99 % | TEMPERATURE: 98.8 F | HEART RATE: 78 BPM | WEIGHT: 163.14 LBS | SYSTOLIC BLOOD PRESSURE: 150 MMHG | RESPIRATION RATE: 14 BRPM

## 2023-02-07 DIAGNOSIS — K80.20 GALLSTONES: ICD-10-CM

## 2023-02-07 DIAGNOSIS — R16.0 LIVER MASS, RIGHT LOBE: Primary | ICD-10-CM

## 2023-02-07 LAB
A/G RATIO: 1.2 (ref 1.1–2.2)
ALBUMIN SERPL-MCNC: 4.4 G/DL (ref 3.4–5)
ALP BLD-CCNC: 128 U/L (ref 40–129)
ALT SERPL-CCNC: 20 U/L (ref 10–40)
AMORPHOUS: ABNORMAL /HPF
ANION GAP SERPL CALCULATED.3IONS-SCNC: 15 MMOL/L (ref 3–16)
AST SERPL-CCNC: 28 U/L (ref 15–37)
BACTERIA: ABNORMAL /HPF
BASOPHILS ABSOLUTE: 0.1 K/UL (ref 0–0.2)
BASOPHILS RELATIVE PERCENT: 0.5 %
BILIRUB SERPL-MCNC: 0.5 MG/DL (ref 0–1)
BILIRUBIN URINE: NEGATIVE
BLOOD, URINE: ABNORMAL
BUN BLDV-MCNC: 16 MG/DL (ref 7–20)
CALCIUM SERPL-MCNC: 9.8 MG/DL (ref 8.3–10.6)
CHLORIDE BLD-SCNC: 98 MMOL/L (ref 99–110)
CLARITY: CLEAR
CO2: 27 MMOL/L (ref 21–32)
COLOR: YELLOW
CREAT SERPL-MCNC: 0.7 MG/DL (ref 0.6–1.2)
EKG ATRIAL RATE: 75 BPM
EKG DIAGNOSIS: NORMAL
EKG P AXIS: 43 DEGREES
EKG P-R INTERVAL: 172 MS
EKG Q-T INTERVAL: 378 MS
EKG QRS DURATION: 92 MS
EKG QTC CALCULATION (BAZETT): 422 MS
EKG R AXIS: 58 DEGREES
EKG T AXIS: 42 DEGREES
EKG VENTRICULAR RATE: 75 BPM
EOSINOPHILS ABSOLUTE: 0.1 K/UL (ref 0–0.6)
EOSINOPHILS RELATIVE PERCENT: 0.7 %
EPITHELIAL CELLS, UA: ABNORMAL /HPF (ref 0–5)
GFR SERPL CREATININE-BSD FRML MDRD: >60 ML/MIN/{1.73_M2}
GLUCOSE BLD-MCNC: 126 MG/DL (ref 70–99)
GLUCOSE URINE: NEGATIVE MG/DL
HCT VFR BLD CALC: 39.8 % (ref 36–48)
HEMOGLOBIN: 13.5 G/DL (ref 12–16)
KETONES, URINE: NEGATIVE MG/DL
LEUKOCYTE ESTERASE, URINE: ABNORMAL
LIPASE: 23 U/L (ref 13–60)
LYMPHOCYTES ABSOLUTE: 2.2 K/UL (ref 1–5.1)
LYMPHOCYTES RELATIVE PERCENT: 20 %
MCH RBC QN AUTO: 30.7 PG (ref 26–34)
MCHC RBC AUTO-ENTMCNC: 34 G/DL (ref 31–36)
MCV RBC AUTO: 90.2 FL (ref 80–100)
MICROSCOPIC EXAMINATION: YES
MONOCYTES ABSOLUTE: 1 K/UL (ref 0–1.3)
MONOCYTES RELATIVE PERCENT: 9 %
NEUTROPHILS ABSOLUTE: 7.8 K/UL (ref 1.7–7.7)
NEUTROPHILS RELATIVE PERCENT: 69.8 %
NITRITE, URINE: NEGATIVE
PDW BLD-RTO: 12.9 % (ref 12.4–15.4)
PH UA: 6.5 (ref 5–8)
PLATELET # BLD: 322 K/UL (ref 135–450)
PMV BLD AUTO: 9.1 FL (ref 5–10.5)
POTASSIUM SERPL-SCNC: 4 MMOL/L (ref 3.5–5.1)
PROTEIN UA: NEGATIVE MG/DL
RBC # BLD: 4.41 M/UL (ref 4–5.2)
RBC UA: ABNORMAL /HPF (ref 0–4)
SODIUM BLD-SCNC: 140 MMOL/L (ref 136–145)
SPECIFIC GRAVITY UA: 1.02 (ref 1–1.03)
TOTAL PROTEIN: 8.2 G/DL (ref 6.4–8.2)
TROPONIN: <0.01 NG/ML
URINE REFLEX TO CULTURE: ABNORMAL
URINE TYPE: ABNORMAL
UROBILINOGEN, URINE: 0.2 E.U./DL
WBC # BLD: 11.1 K/UL (ref 4–11)
WBC UA: ABNORMAL /HPF (ref 0–5)

## 2023-02-07 PROCEDURE — 93005 ELECTROCARDIOGRAM TRACING: CPT | Performed by: EMERGENCY MEDICINE

## 2023-02-07 PROCEDURE — 96374 THER/PROPH/DIAG INJ IV PUSH: CPT

## 2023-02-07 PROCEDURE — 2580000003 HC RX 258: Performed by: EMERGENCY MEDICINE

## 2023-02-07 PROCEDURE — 6360000004 HC RX CONTRAST MEDICATION: Performed by: EMERGENCY MEDICINE

## 2023-02-07 PROCEDURE — 73030 X-RAY EXAM OF SHOULDER: CPT

## 2023-02-07 PROCEDURE — 93010 ELECTROCARDIOGRAM REPORT: CPT | Performed by: INTERNAL MEDICINE

## 2023-02-07 PROCEDURE — 84484 ASSAY OF TROPONIN QUANT: CPT

## 2023-02-07 PROCEDURE — 81001 URINALYSIS AUTO W/SCOPE: CPT

## 2023-02-07 PROCEDURE — 83690 ASSAY OF LIPASE: CPT

## 2023-02-07 PROCEDURE — 99285 EMERGENCY DEPT VISIT HI MDM: CPT

## 2023-02-07 PROCEDURE — 85025 COMPLETE CBC W/AUTO DIFF WBC: CPT

## 2023-02-07 PROCEDURE — 36415 COLL VENOUS BLD VENIPUNCTURE: CPT

## 2023-02-07 PROCEDURE — 80053 COMPREHEN METABOLIC PANEL: CPT

## 2023-02-07 PROCEDURE — 6360000002 HC RX W HCPCS: Performed by: EMERGENCY MEDICINE

## 2023-02-07 PROCEDURE — 74177 CT ABD & PELVIS W/CONTRAST: CPT

## 2023-02-07 RX ORDER — KETOROLAC TROMETHAMINE 15 MG/ML
15 INJECTION, SOLUTION INTRAMUSCULAR; INTRAVENOUS ONCE
Status: COMPLETED | OUTPATIENT
Start: 2023-02-07 | End: 2023-02-07

## 2023-02-07 RX ORDER — OXYCODONE HYDROCHLORIDE 5 MG/1
5 TABLET ORAL EVERY 8 HOURS PRN
Qty: 9 TABLET | Refills: 0 | Status: SHIPPED | OUTPATIENT
Start: 2023-02-07 | End: 2023-02-10

## 2023-02-07 RX ORDER — 0.9 % SODIUM CHLORIDE 0.9 %
1000 INTRAVENOUS SOLUTION INTRAVENOUS ONCE
Status: COMPLETED | OUTPATIENT
Start: 2023-02-07 | End: 2023-02-07

## 2023-02-07 RX ADMIN — IOPAMIDOL 100 ML: 755 INJECTION, SOLUTION INTRAVENOUS at 10:03

## 2023-02-07 RX ADMIN — KETOROLAC TROMETHAMINE 15 MG: 15 INJECTION, SOLUTION INTRAMUSCULAR; INTRAVENOUS at 09:26

## 2023-02-07 RX ADMIN — SODIUM CHLORIDE 1000 ML: 9 INJECTION, SOLUTION INTRAVENOUS at 09:26

## 2023-02-07 SDOH — ECONOMIC STABILITY: FOOD INSECURITY: WITHIN THE PAST 12 MONTHS, THE FOOD YOU BOUGHT JUST DIDN'T LAST AND YOU DIDN'T HAVE MONEY TO GET MORE.: NEVER TRUE

## 2023-02-07 SDOH — ECONOMIC STABILITY: INCOME INSECURITY: HOW HARD IS IT FOR YOU TO PAY FOR THE VERY BASICS LIKE FOOD, HOUSING, MEDICAL CARE, AND HEATING?: NOT HARD AT ALL

## 2023-02-07 SDOH — ECONOMIC STABILITY: HOUSING INSECURITY
IN THE LAST 12 MONTHS, WAS THERE A TIME WHEN YOU DID NOT HAVE A STEADY PLACE TO SLEEP OR SLEPT IN A SHELTER (INCLUDING NOW)?: NO

## 2023-02-07 SDOH — ECONOMIC STABILITY: FOOD INSECURITY: WITHIN THE PAST 12 MONTHS, YOU WORRIED THAT YOUR FOOD WOULD RUN OUT BEFORE YOU GOT MONEY TO BUY MORE.: NEVER TRUE

## 2023-02-07 SDOH — ECONOMIC STABILITY: TRANSPORTATION INSECURITY
IN THE PAST 12 MONTHS, HAS LACK OF TRANSPORTATION KEPT YOU FROM MEETINGS, WORK, OR FROM GETTING THINGS NEEDED FOR DAILY LIVING?: NO

## 2023-02-07 ASSESSMENT — ENCOUNTER SYMPTOMS
TROUBLE SWALLOWING: 0
STRIDOR: 0
WHEEZING: 0
SHORTNESS OF BREATH: 0
ABDOMINAL PAIN: 1
FACIAL SWELLING: 0
VOMITING: 0
COLOR CHANGE: 0
VOICE CHANGE: 0
NAUSEA: 1

## 2023-02-07 ASSESSMENT — LIFESTYLE VARIABLES
HOW OFTEN DO YOU HAVE A DRINK CONTAINING ALCOHOL: NEVER
HOW MANY STANDARD DRINKS CONTAINING ALCOHOL DO YOU HAVE ON A TYPICAL DAY: PATIENT DOES NOT DRINK

## 2023-02-07 ASSESSMENT — PAIN - FUNCTIONAL ASSESSMENT: PAIN_FUNCTIONAL_ASSESSMENT: 0-10

## 2023-02-07 ASSESSMENT — PAIN SCALES - GENERAL
PAINLEVEL_OUTOF10: 4
PAINLEVEL_OUTOF10: 4
PAINLEVEL_OUTOF10: 6
PAINLEVEL_OUTOF10: 5
PAINLEVEL_OUTOF10: 6
PAINLEVEL_OUTOF10: 4

## 2023-02-07 NOTE — DISCHARGE INSTRUCTIONS
Please follow-up with your primary care doctor to arrange future outpatient testing of the liver mass for work-up of possible cancer

## 2023-02-07 NOTE — TELEPHONE ENCOUNTER
Please call to schedule follow up . Sole Galo Patient was at the emergency room today. Work-up revealed a large right liver mass suspicious for hepatocellular carcinoma.   We will call patient and get her into clinic and refer her accordingly    RUQ abd pain

## 2023-02-07 NOTE — ED PROVIDER NOTES
[address] [facility]  eMERGENCY dEPARTMENT eNCOUnter      Pt Name: Dedra Ochoa  MRN: [id number]  Sharagfarmando 1950  Date of evaluation: 2/7/2023  Provider: Rita Lang MD    CHIEF COMPLAINT       Chief Complaint   Patient presents with    Abdominal Pain     Right upper quadrant abd pain  Hardly slept last night  No emesis  Saw Dr Denise Irizarry last week and had right shoulder pain and he suggested it could be her gallbladder           HISTORY OF PRESENT ILLNESS   (Location/Symptom, Timing/Onset, Context/Setting, Quality, Duration, Modifying Factors, Severity)  Note limiting factors. History obtained from: the patient    Dedra Ochoa is a 71 y.o. female who reports approximately 2 months of right shoulder pain and right upper quadrant abdominal pain which her primary care doctor thinks possibly could be gallstones however no imaging has been performed. Patient reports her symptoms seem to worsen around 4 PM yesterday after eating [employer] and made it difficult for her to sleep. Patient denies any chest pain, fever, or diaphoresis. Patient reports pain is \"spasms\" of her right upper quadrant and right shoulder as well as intermittent nausea although she denies any nausea at this time. Patient denies any leg swelling or hemoptysis. [facility] Lopez [facility]    Nursing Notes were reviewed. REVIEW OFSYSTEMS    (2-9 systems for level 4, 10 or more for level 5)     Review of Systems   Constitutional:  Negative for appetite change, fever and unexpected weight change. HENT:  Negative for facial swelling, trouble swallowing and voice change. Eyes:  Negative for visual disturbance. Respiratory:  Negative for shortness of breath, wheezing and stridor. Cardiovascular:  Negative for chest pain and palpitations. Gastrointestinal:  Positive for abdominal pain and nausea (intermittiant, none currently). Negative for vomiting. Genitourinary:  Negative for dysuria and vaginal bleeding. Musculoskeletal:  Positive for arthralgias. Negative for neck pain and neck stiffness. Skin:  Negative for color change and wound. Neurological:  Negative for seizures and syncope. Psychiatric/Behavioral:  Negative for self-injury and suicidal ideas. Except as noted above the remainder of the review of systems was reviewed and negative. PAST MEDICAL HISTORY     Past Medical History:   Diagnosis Date    Allergic rhinitis     Basal cell cancer May 2014    Forehead: Dr. Ryland Puente    BRCA positive     Depressive disorder     Hearing loss     HTN (hypertension)     Hx of bilateral mastectomy 7/27/2015    Hypercholesterolemia     Malignant neoplasm of female breast [facility])     s/p B Mastectomy - Dr. Lucy Rodríguez 2004    Mild aortic sclerosis 01/17/2019    ECHO 01/17/19    Non-thrombocytopenic purpura ([address]     Osteopenia     DXA 10/12/11 Improving densitiy L hip    Skin lesion     4mm lesion L nose- dr. Ryland Puente - (benign)    Stress incontinence 10/5/2012    Trace aortic valve regurgitation 01/17/2019    ECHO 01/17/19    Vertigo          SURGICAL HISTORY       Past Surgical History:   Procedure Laterality Date    APPENDECTOMY  01/01/1975    COLONOSCOPY  08/11/2006    HYSTERECTOMY (CERVIX STATUS UNKNOWN)  01/01/1993    KALIN/BSO    MASTECTOMY Bilateral 01/01/2004    Bilateral    OVARY REMOVAL           CURRENT MEDICATIONS       Previous Medications    AMLODIPINE (NORVASC) 5 MG TABLET    TAKE ONE TABLET BY MOUTH DAILY    ASCORBIC ACID (VITAMIN C) 500 MG TABLET    Take 500 mg by mouth daily.     CHOLECALCIFEROL (VITAMIN D3) 2000 UNITS CAPS    Take 2,000 Units by mouth daily    FLUTICASONE (FLONASE) 50 MCG/ACT NASAL SPRAY    INSTILL 1 SPRAY INTO THE NOSTRILS DAILY    LISINOPRIL (PRINIVIL;ZESTRIL) 20 MG TABLET    TAKE ONE TABLET BY MOUTH DAILY    METOPROLOL TARTRATE (LOPRESSOR) 25 MG TABLET    TAKE ONE TABLET BY MOUTH TWICE A DAY    MULTIPLE VITAMINS-MINERALS (THERAPEUTIC MULTIVITAMIN-MINERALS) TABLET    Take 1 tablet by mouth daily. OMEGA-3 FATTY ACIDS (FISH OIL PO)    Take  by mouth. PROBIOTIC PRODUCT (PROBIOTIC DAILY PO)    Take by mouth    SIMVASTATIN (ZOCOR) 20 MG TABLET    TAKE ONE TABLET BY MOUTH DAILY    VENLAFAXINE (EFFEXOR XR) 75 MG EXTENDED RELEASE CAPSULE    TAKE ONE CAPSULE BY MOUTH DAILY       ALLERGIES     Patient has no known allergies. FAMILY HISTORY       Family History   Problem Relation Age of Onset    High Cholesterol Father          at 80    Elevated Lipids Father     Hypertension Father     Coronary Art Dis Father     Alzheimer's Disease Mother          at 80    Other Mother         Hip FX and Pulm Embolism     Hypertension Mother     Breast Cancer Sister         Dead    Stroke Daughter     Depression Daughter           SOCIAL HISTORY       Social History     Socioeconomic History    Marital status:      Spouse name: Jose Alfredo Ferrer    Number of children: 2    Years of education: None    Highest education level: None   Occupational History    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[place] [address] Use    Smoking status: Never    Smokeless tobacco: Never   Vaping Use    Vaping Use: Never used   Substance and Sexual Activity    Alcohol use: Yes     Alcohol/week: 0.0 standard drinks     Comment: occassional    Drug use: Never     Social Determinants of Health     Financial Resource Strain: Low Risk     Difficulty of Paying Living Expenses: Not hard at all   Food Insecurity: No Food Insecurity    Worried About [employer] [employer] in the Last Year: Never true    Ran Out of Food in the Last Year: Never true   Physical Activity: Sufficiently Active    Days of Exercise per Week: 4 days    Minutes of Exercise per Session: 60 min         PHYSICAL EXAM    (up to 7 for level 4, 8 or more for level 5)     ED Triage Vitals [23 0830]   BP Temp Temp Source Heart Rate Resp SpO2 Height Weight   -- 98.8 °F (37.1 °C) Oral 80 14 -- -- --       Physical Exam  Vitals and nursing note reviewed. Constitutional:       Appearance: She is well-developed. She is not diaphoretic. HENT:      Head: Normocephalic and atraumatic. Right Ear: External ear normal.      Left Ear: External ear normal.   Eyes:      Conjunctiva/sclera: Conjunctivae normal.   Neck:      Vascular: No JVD. Trachea: No tracheal deviation. Cardiovascular:      Rate and Rhythm: Normal rate. Pulses: Normal pulses. Pulmonary:      Effort: Pulmonary effort is normal. No respiratory distress. Breath sounds: Normal breath sounds. No wheezing. Abdominal:      General: There is no distension. Palpations: Abdomen is soft. Tenderness: There is abdominal tenderness in the epigastric area. There is no guarding or rebound. Comments: Mild epigastric abdominal tenderness. No rebound, guarding, peritoneal signs. Musculoskeletal:         General: Tenderness present. No swelling or deformity. Normal range of motion. Cervical back: Neck supple. Comments: Very mild tenderness over the supraspinatus of the right shoulder. Full active and passive range of motion. No palpable bone deformity. Skin:     General: Skin is warm and dry. Neurological:      General: No focal deficit present. Mental Status: She is alert. Cranial Nerves: No cranial nerve deficit. Comments: Strength and sensation intact in right upper extremity       DIAGNOSTIC RESULTS     RADIOLOGY:     Interpretation per the Radiologist below, if available at the time of this note:    CT ABDOMEN PELVIS W IV CONTRAST Additional Contrast? None   Final Result   Large heterogeneous mass noted in the right lobe of the liver, suspicious for   hepatocellular carcinoma. A metastasis cannot be excluded. Small cysts   noted in the the left lobe of the liver. Several gallstones noted in the gallbladder. Calcified granulomas in the   spleen. Status post hysterectomy. Degenerative disc disease of the L4-L5   and L5-S1 discs. XR SHOULDER RIGHT (MIN 2 VIEWS)   Final Result   No acute abnormality. LABS:  Labs Reviewed   CBC WITH AUTO DIFFERENTIAL - Abnormal; Notable for the following components:       Result Value    WBC 11.1 (*)     Neutrophils Absolute 7.8 (*)     All other components within normal limits   COMPREHENSIVE METABOLIC PANEL - Abnormal; Notable for the following components:    Chloride 98 (*)     Glucose 126 (*)     All other components within normal limits   URINALYSIS WITH REFLEX TO CULTURE - Abnormal; Notable for the following components:    Blood, Urine TRACE-LYSED (*)     Leukocyte Esterase, Urine MODERATE (*)     All other components within normal limits   MICROSCOPIC URINALYSIS - Abnormal; Notable for the following components:    Bacteria, UA 1+ (*)     All other components within normal limits   LIPASE   TROPONIN       All otherlabs were within normal range or not returned as of this dictation. EMERGENCY DEPARTMENT COURSE and DIFFERENTIAL DIAGNOSIS/MDM:   Vitals:    Vitals:    02/07/23 0830 02/07/23 1101   BP: (!) 153/76    Pulse: 80    Resp: 14    Temp: 98.8 °F (37.1 °C)    TempSrc: Oral    Weight:  163 lb 2.3 oz (74 kg)       Patient was given the following medications:  Medications   0.9 % sodium chloride bolus (0 mLs IntraVENous Stopped 2/7/23 1100)   ketorolac (TORADOL) injection 15 mg (15 mg IntraVENous Given 2/7/23 0926)   iopamidol (ISOVUE-370) 76 % injection 100 mL (100 mLs IntraVENous Given 2/7/23 1003)       CONSULTS: (Who and What was discussed)  Primary Care - Dr. Adalgisa Santana - discussed CT imaging including liver mass and outpatient follow up needed. CC/HPI Summary, DDx, ED Course, Reassessment, Disposition Considerations (include Tests not done, Admit vs D/C, Shared Decision Making, Pt Expectation of Test or Tx.):  Patient is mildly hypertensive but all other vital signs are within normal limits.   Laboratory evaluation is within normal limits not show any signs of acute emergent endorgan damage. Patient's right shoulder x-ray is read by the radiologist as well as independently reviewed by myself does not show any evidence of acute fracture dislocation. Patient is neurovascularly intact. I have considered ACS however patient's troponin is undetectable, symptoms of been constant, and I do not suspect ACS at this time and primarily suspect intra-abdominal pathology. CT abdomen pelvis is performed in the emergency department and read by the radiologist as well as independently reviewed by myself and does show a 10 cm mass in the right lobe of the liver concerning for possible hepatocellular carcinoma. There is also cholelithiasis without evidence of acute cholecystitis. These findings were discussed in detail with the patient and her  as well as reviewed with the patient's primary care doctor over the phone who reports that he will help arrange expedient outpatient work-up as hepatocellular carcinoma is high on the differential.  Extended time given to the patient to answer all appropriate questions. Pain medication prescribed per the patient's request and standard ER return precautions given for new or worsening symptoms such as jaundice, fever, or uncontrolled pain. Patient expresses understanding and agreement with this plan and is discharged home. I discussed the nature and purpose, risks and benefits, as well as, the alternatives of opiates for pain relief with eLsia Landry. The risks discussed included but were not limited to overdose, addiction, dependence, and tolerance. Lesia Landry was given the time and opportunity to ask questions and consider their options, and after the discussion, Lesia Landry decided to verbally consent to opiates. Prior to consenting, I believe that Lesia Landry has the capacity to make this medical decision. FINAL IMPRESSION      1. Liver mass, right lobe    2.  Gallstones          DISPOSITION/PLAN DISPOSITION Decision To Discharge 02/07/2023 11:42:13 AM      PATIENT REFERRED TO:  Nita Pappas MD  [address] [place] [address] [address]  [phone]    In 1 day      [place] [address]  [phone]    If symptoms worsen    DISCHARGE MEDICATIONS:  New Prescriptions    OXYCODONE (ROXICODONE) 5 MG IMMEDIATE RELEASE TABLET    Take 1 tablet by mouth every 8 hours as needed for Pain for up to 3 days. Intended supply: 3 days. Take lowest dose possible to manage pain Max Daily Amount: 15 mg              (Please note that portions of this note were completed with a voice recognition program.  Efforts were made to edit the dictations but occasionally words are mis-transcribed. )    Airam Boykin MD (electronically signed)  Attending Emergency Physician           Airam Boykin MD  02/07/23 0962

## 2023-02-07 NOTE — ED NOTES
Dc'd home with   Awake alert  resp easy and unlabored  States toradol did help pain at a tolerable level  Walked out with ease  Skin warm and dry   and patient were able to ask doctor many questions about CT results  To follow up with Dr Mallory Sheppard to return any changes or concerns       Jessica Campbell RN  02/07/23 9736

## 2023-02-09 ENCOUNTER — OFFICE VISIT (OUTPATIENT)
Dept: PRIMARY CARE CLINIC | Age: 73
End: 2023-02-09
Payer: MEDICARE

## 2023-02-09 VITALS
WEIGHT: 162.2 LBS | HEART RATE: 97 BPM | DIASTOLIC BLOOD PRESSURE: 71 MMHG | BODY MASS INDEX: 26.18 KG/M2 | SYSTOLIC BLOOD PRESSURE: 139 MMHG

## 2023-02-09 DIAGNOSIS — R16.0 LIVER MASS: Primary | ICD-10-CM

## 2023-02-09 DIAGNOSIS — Z08 ENCOUNTER FOR FOLLOW-UP EXAMINATION AFTER COMPLETED TREATMENT FOR MALIGNANT NEOPLASM: ICD-10-CM

## 2023-02-09 DIAGNOSIS — D37.9 NEOPLASM OF UNCERTAIN BEHAVIOR OF DIGESTIVE ORGAN, UNSPECIFIED: ICD-10-CM

## 2023-02-09 DIAGNOSIS — R93.2 ABNORMAL FINDINGS ON DIAGNOSTIC IMAGING OF LIVER AND BILIARY TRACT: ICD-10-CM

## 2023-02-09 DIAGNOSIS — Z11.59 ENCOUNTER FOR SCREENING FOR OTHER VIRAL DISEASES: ICD-10-CM

## 2023-02-09 PROCEDURE — 99215 OFFICE O/P EST HI 40 MIN: CPT | Performed by: FAMILY MEDICINE

## 2023-02-09 PROCEDURE — G8484 FLU IMMUNIZE NO ADMIN: HCPCS | Performed by: FAMILY MEDICINE

## 2023-02-09 PROCEDURE — 1123F ACP DISCUSS/DSCN MKR DOCD: CPT | Performed by: FAMILY MEDICINE

## 2023-02-09 PROCEDURE — 3017F COLORECTAL CA SCREEN DOC REV: CPT | Performed by: FAMILY MEDICINE

## 2023-02-09 PROCEDURE — G8427 DOCREV CUR MEDS BY ELIG CLIN: HCPCS | Performed by: FAMILY MEDICINE

## 2023-02-09 PROCEDURE — 3078F DIAST BP <80 MM HG: CPT | Performed by: FAMILY MEDICINE

## 2023-02-09 PROCEDURE — 1090F PRES/ABSN URINE INCON ASSESS: CPT | Performed by: FAMILY MEDICINE

## 2023-02-09 PROCEDURE — G8417 CALC BMI ABV UP PARAM F/U: HCPCS | Performed by: FAMILY MEDICINE

## 2023-02-09 PROCEDURE — 3075F SYST BP GE 130 - 139MM HG: CPT | Performed by: FAMILY MEDICINE

## 2023-02-09 PROCEDURE — G9708 BILAT MAST/HX BI /UNILAT MAS: HCPCS | Performed by: FAMILY MEDICINE

## 2023-02-09 PROCEDURE — G8399 PT W/DXA RESULTS DOCUMENT: HCPCS | Performed by: FAMILY MEDICINE

## 2023-02-09 PROCEDURE — 1036F TOBACCO NON-USER: CPT | Performed by: FAMILY MEDICINE

## 2023-02-09 NOTE — PROGRESS NOTES
Chief Complaint   Patient presents with    Follow-Up from Hospital       HPI: Clarisse Barboza  presents for evaluation and management of her mass. She notes that beginning Sunday she was having right-sided abdominal pain whenever she coughed took a deep breath or rolled over in bed. She did not associate with with eating. She went to the emergency room on the sixth and was found to have a 10 cm complex liver mass along with gallstones. She notes no easy bleeding or bruising. She has been in a monogamous relationship for many years and had a hepatitis C screening test done about 5 years ago. She was aware of the liver mass before the doctor came in and talk to her because the results came up on her [address] She tells me she is very anxious and stressed about the whole affair. She does not want to die and wants to maximize the quality and quantity of her life. Review of Systems    No Known Allergies  New Prescriptions    No medications on file     Current Outpatient Medications   Medication Sig Dispense Refill    oxyCODONE (ROXICODONE) 5 MG immediate release tablet Take 1 tablet by mouth every 8 hours as needed for Pain for up to 3 days. Intended supply: 3 days.  Take lowest dose possible to manage pain Max Daily Amount: 15 mg 9 tablet 0    lisinopril (PRINIVIL;ZESTRIL) 20 MG tablet TAKE ONE TABLET BY MOUTH DAILY 90 tablet 1    simvastatin (ZOCOR) 20 MG tablet TAKE ONE TABLET BY MOUTH DAILY 90 tablet 1    venlafaxine (EFFEXOR XR) 75 MG extended release capsule TAKE ONE CAPSULE BY MOUTH DAILY 90 capsule 3    amLODIPine (NORVASC) 5 MG tablet TAKE ONE TABLET BY MOUTH DAILY 90 tablet 3    metoprolol tartrate (LOPRESSOR) 25 MG tablet TAKE ONE TABLET BY MOUTH TWICE A  tablet 3    Cholecalciferol (VITAMIN D3) 2000 units CAPS Take 2,000 Units by mouth daily      Probiotic Product (PROBIOTIC DAILY PO) Take by mouth      fluticasone (FLONASE) 50 MCG/ACT nasal spray INSTILL 1 SPRAY INTO THE NOSTRILS DAILY 1 Bottle 0    Omega-3 Fatty Acids (FISH OIL PO) Take  by mouth. Multiple Vitamins-Minerals (THERAPEUTIC MULTIVITAMIN-MINERALS) tablet Take 1 tablet by mouth daily. Ascorbic Acid (VITAMIN C) 500 MG tablet Take 500 mg by mouth daily. No current facility-administered medications for this visit. Past Medical History:   Diagnosis Date    Allergic rhinitis     Basal cell cancer May 2014    Forehead: Dr. Julius Estrella    BRCA positive     Depressive disorder     Hearing loss     HTN (hypertension)     Hx of bilateral mastectomy 7/27/2015    Hypercholesterolemia     Malignant neoplasm of female breast [facility])     s/p B Mastectomy - Dr. Ricky Hernandez 2004    Mild aortic sclerosis 01/17/2019    ECHO 01/17/19    Non-thrombocytopenic purpura ([address]     Osteopenia     DXA 10/12/11 Improving densitiy L hip    Skin lesion     4mm lesion L nose- dr. Julius Estrella - (benign)    Stress incontinence 10/5/2012    Trace aortic valve regurgitation 01/17/2019    ECHO 01/17/19    Vertigo          Objective   Wt 162 lb 3.2 oz (73.6 kg)   BMI 26.18 kg/m²   Wt Readings from Last 3 Encounters:   02/09/23 162 lb 3.2 oz (73.6 kg)   02/07/23 163 lb 2.3 oz (74 kg)   01/31/23 162 lb 6.4 oz (73.7 kg)       Physical Exam  Constitutional:       Appearance: She is well-developed. Cardiovascular:      Rate and Rhythm: Normal rate and regular rhythm. Pulses:           Dorsalis pedis pulses are 2+ on the right side and 2+ on the left side. Posterior tibial pulses are 2+ on the right side and 2+ on the left side. Heart sounds: No murmur heard. No friction rub. No gallop. Comments: No Lower Extremity Edema  Pulmonary:      Effort: Pulmonary effort is normal.      Breath sounds: Normal breath sounds. No wheezing or rales. Abdominal:      General: Bowel sounds are normal. There is no distension. Palpations: Abdomen is soft. There is no mass. Tenderness: There is abdominal tenderness.    Skin:     General: Skin is warm and dry. Findings: No rash. CT Abd Pelvis 2/7/23:  Impression   Large heterogeneous mass noted in the right lobe of the liver, suspicious for   hepatocellular carcinoma. A metastasis cannot be excluded. Small cysts   noted in the the left lobe of the liver. Several gallstones noted in the gallbladder. Calcified granulomas in the   spleen. Status post hysterectomy. Degenerative disc disease of the L4-L5   and L5-S1 discs. Chemistry        Component Value Date/Time     02/07/2023 0855    K 4.0 02/07/2023 0855    CL 98 (L) 02/07/2023 0855    CO2 27 02/07/2023 0855    BUN 16 02/07/2023 0855    CREATININE 0.7 02/07/2023 0855        Component Value Date/Time    CALCIUM 9.8 02/07/2023 0855    ALKPHOS 128 02/07/2023 0855    AST 28 02/07/2023 0855    ALT 20 02/07/2023 0855    BILITOT 0.5 02/07/2023 0855          Lab Results   Component Value Date    WBC 11.1 (H) 02/07/2023    HGB 13.5 02/07/2023    HCT 39.8 02/07/2023    MCV 90.2 02/07/2023     02/07/2023     Lab Results   Component Value Date    LABA1C 5.2 07/26/2021     No results found for: EAG  Lab Results   Component Value Date    LABA1C 5.2 07/26/2021     No components found for: CHLPL  Lab Results   Component Value Date    TRIG 131 01/31/2023    TRIG 86 01/27/2022    TRIG 121 11/16/2020     Lab Results   Component Value Date    HDL 85 (H) 01/31/2023    HDL 90 (H) 01/27/2022    HDL 76 (H) 11/16/2020     Lab Results   Component Value Date    LDLCALC 89 01/31/2023    LDLCALC 93 01/27/2022    LDLCALC 116 (H) 11/16/2020     Lab Results   Component Value Date    LABVLDL 26 01/31/2023    LABVLDL 17 01/27/2022    LABVLDL 24 11/16/2020         Assessment   Plan   1. Liver mass  Concerning for possible hepatocellular carcinoma. I have spoken to Dr. Randell Gibbons already and we will refer to Dr. Lasha Case for assistance in evaluation and management of this. We will obtain a CT looking for additional tumor burden.   Her alk phos was normal so I did not order a bone scan at this time. We will also check other underlying tumor markers. I spent a good bit of time giving her some counseling on managing the psychological aspects of this illness.  - CT CHEST ABDOMEN PELVIS W CONTRAST Additional Contrast? Radiologist Recommendation; Future  - Hepatitis B Core Antibody, Total; Future  - Hepatitis C Antibody; Future  - AFP Tumor Marker; Future  - CANCER ANTIGEN 19-9; Future  - CEA; Future  - Zaheer Garcia MD, Surgical Oncology, Chayo Villalobos MD, Oncology, [facility] SPECIALTY [facility]    2. Encounter for screening for other viral diseases     - Hepatitis B Core Antibody, Total; Future    3. Abnormal findings on diagnostic imaging of liver and biliary tract     - AFP Tumor Marker; Future    4. 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 Encounter for follow-up examination after completed treatment for malignant neoplasm     - CEA;  Future        RTC   Future Appointments   Date Time Provider Department Center   2/28/2023  9:00 AM MD ADRIANA Zendejas [place] [facility]   10/24/2023 10:30 AM [facility]ED[facility] [place] [facility]   11/16/2023 11:30 AM SCHEDULE, [facility] [place] [facility]

## 2023-02-09 NOTE — Clinical Note
Yodit Wallace is a long term patient of mine who has a large liver mass. Please assist me in the evaluation and management of this issue. She also has gallstones that may be symptomatic and need her GB removed.  Thanks,  Phoebe Sawyer

## 2023-02-09 NOTE — PATIENT INSTRUCTIONS
[facility], [address] [place] [address], [address] [place] [address] Rd, [address]  Vadim Figueroa  Phone: [phone]  Fax: [phone]

## 2023-02-10 ENCOUNTER — PATIENT MESSAGE (OUTPATIENT)
Dept: PRIMARY CARE CLINIC | Age: 73
End: 2023-02-10

## 2023-02-10 ENCOUNTER — TELEPHONE (OUTPATIENT)
Dept: SURGERY | Age: 73
End: 2023-02-10

## 2023-02-10 NOTE — PROGRESS NOTES
[facility]  [address] , [address] [place] [address] Ave  Phone: [phone]  Fax: [phone]    Visit Date: 2/14/2023    Subjective:   Kaylin Manuel is a 71 y.o. female referred by [facility] for evaluation and management of a 10cm liver mass recently found on imaging as workup for RUQ pain in ER 2/6. Has an approximate 3-4 month history of intermittent right shoulder pain and right upper quadrant pain  that was exacerbated by deep breathing. Work-up for right shoulder pain included x-ray and labs, all of which were normal.     Pain significantly worsened after eating a meal 2/6 and patient presented to the ER 2/7. She was found to have a large right-lobe liver mass and gallstones (but no acute cholecystitis) on CT done in ER 2/7. Denies weight loss, jaundice, nausea/vomiting, denies change in bowel habits. No blood in stool or urine that she is aware of.      Past Medical History:   Diagnosis Date    Allergic rhinitis     Basal cell cancer May 2014    Forehead: Dr. Salina Garcia    BRCA positive     Depressive disorder     Hearing loss     HTN (hypertension)     Hx of bilateral mastectomy 7/27/2015    Hypercholesterolemia     Malignant neoplasm of female breast [facility])     s/p B Mastectomy - Dr. Indio Nair 2004    Mild aortic sclerosis 01/17/2019    ECHO 01/17/19    Non-thrombocytopenic purpura ([address]     Osteopenia     DXA 10/12/11 Improving densitiy L hip    Skin lesion     4mm lesion L nose- dr. Salina Garcia - (benign)    Stress incontinence 10/5/2012    Trace aortic valve regurgitation 01/17/2019    ECHO 01/17/19    Vertigo      Past Surgical History:   Procedure Laterality Date    APPENDECTOMY  01/01/1975    COLONOSCOPY  08/11/2006    HYSTERECTOMY (CERVIX STATUS UNKNOWN)  01/01/1993    KALIN/BSO    MASTECTOMY Bilateral 01/01/2004    Bilateral    OVARY REMOVAL         Social History     Tobacco Use    Smoking status: Never    Smokeless tobacco: Never   Vaping Use Vaping Use: Never used   Substance Use Topics    Alcohol use: Yes     Alcohol/week: 0.0 standard drinks     Comment: occassional    Drug use: Never     Family History   Problem Relation Age of Onset    High Cholesterol Father          at 80    Elevated Lipids Father     Hypertension Father     Coronary Art Dis Father     Alzheimer's Disease Mother          at 80    Other Mother         Hip FX and Pulm Embolism     Hypertension Mother     Breast Cancer Sister         Dead    Stroke Daughter     Depression Daughter        Allergies: Patient has no known allergies. Current Outpatient Medications   Medication Sig Dispense Refill    lisinopril (PRINIVIL;ZESTRIL) 20 MG tablet TAKE ONE TABLET BY MOUTH DAILY 90 tablet 1    simvastatin (ZOCOR) 20 MG tablet TAKE ONE TABLET BY MOUTH DAILY 90 tablet 1    venlafaxine (EFFEXOR XR) 75 MG extended release capsule TAKE ONE CAPSULE BY MOUTH DAILY 90 capsule 3    amLODIPine (NORVASC) 5 MG tablet TAKE ONE TABLET BY MOUTH DAILY 90 tablet 3    metoprolol tartrate (LOPRESSOR) 25 MG tablet TAKE ONE TABLET BY MOUTH TWICE A  tablet 3    Cholecalciferol (VITAMIN D3) 2000 units CAPS Take 2,000 Units by mouth daily      Probiotic Product (PROBIOTIC DAILY PO) Take by mouth      fluticasone (FLONASE) 50 MCG/ACT nasal spray INSTILL 1 SPRAY INTO THE NOSTRILS DAILY 1 Bottle 0    Omega-3 Fatty Acids (FISH OIL PO) Take  by mouth. Multiple Vitamins-Minerals (THERAPEUTIC MULTIVITAMIN-MINERALS) tablet Take 1 tablet by mouth daily. Ascorbic Acid (VITAMIN C) 500 MG tablet Take 500 mg by mouth daily. No current facility-administered medications for this visit. Review of Systems: See HPI. All other systems reviewed and are negative.     Objective:     Vitals:    23 1510   BP: (!) 161/80   Pulse: (!) 104   Temp: 98.7 °F (37.1 °C)   TempSrc: Temporal   Weight: 163 lb (73.9 kg)   Height: 5' 6\" (1.676 m)       Physical Exam:   General:  Alert, oriented x 3, cooperative, no distress, appears stated age. Skin: Skin color, texture, turgor normal.    Lymph nodes: Cervical, supraclavicular, and axillary nodes normal.   HENT:  Normocephalic, without obvious abnormality. Moist mucus membranes. No icterus. Lungs: No respiratory distress. Heart:  Regular rate and rhythm. No murmur. Abdomen: Soft, non-tender. No masses,  No organomegaly. Extremities: Extremities normal, atraumatic, no cyanosis or edema. Neurologic: Grossly intact. Psychiatric: Appropriate mood and thought process     Imagin/7/23 CT Abdomen Pelvis  Large heterogeneous mass noted in the right lobe of the liver, suspicious for hepatocellular carcinoma. A metastasis cannot be excluded. Small cysts noted in the the left lobe of the liver. Several gallstones noted in the gallbladder. Calcified granulomas in the spleen. Status post hysterectomy. Degenerative disc disease of the L4-L5 and L5-S1 discs. 2/10/23 CT Chest Abd Pelvis Liver Protocol:   1. Negative CT of the chest.  2. 10 cm hepatic mass. The differential includes hepatocellular carcinoma, cholangiocarcinoma and metastatic disease.     16 Colonoscopy  Normal mucosa throughout the colon  No polyps  Small internal hemorrhoids      Labs:  Lab Results   Component Value Date/Time    CEA 46.6 2023 03:19 PM    SD4626 26.1 2015 10:29 AM     5 2023 03:19 PM     Lab Results   Component Value Date    WBC 11.1 (H) 2023    HGB 13.5 2023    HCT 39.8 2023    MCV 90.2 2023     2023     Lab Results   Component Value Date     2023    K 4.0 2023    CL 98 (L) 2023    CO2 27 2023    BUN 16 2023    CREATININE 0.7 2023    GLUCOSE 126 (H) 2023    CALCIUM 9.8 2023    PROT 8.2 2023    LABALBU 4.4 2023    BILITOT 0.5 2023    ALKPHOS 128 2023    AST 28 2023    ALT 20 2023    LABGLOM >60 2023    GFRAA >60 01/27/2022    AGRATIO 1.2 02/07/2023    GLOB 2.5 07/26/2021     Lab Results   Component Value Date    CEA 46.6 (H) 02/09/2023     AFP: 9.4 (H)     CA 19-9: 5  Hep C Antibody: Non-reactive  Hep B Core Antibody: Negative       ECOG Performance Status   (0) Fully active, able to carry on all predisease performance without restriction  Assessment/Plan:      Diagnosis Orders   1. Liver mass, right lobe  AFL - Deepak Dueñas MD, Gastroenterology (ERCP & EUS), [facility]        I have independently reviewed and interpreted lab results and imaging. Pt appears to have a large right lobe liver mass. Discussed possibility of percutaneous biopsy for definitive tissue diagnosis. Ordered today. Referral placed for Dr. Angely Allen for colonoscopy. Discussed role of medical oncology- pt has been referred to Dr. Jhoana Whaley with [facility]. Surgery is going to be somewhat difficult given large size.  May need liver directed therapy first.    Toyb Burger MD  Surgery Attending

## 2023-02-10 NOTE — TELEPHONE ENCOUNTER
From: Carmine Woodard  To: Dr. Aman Reynoso: 2/10/2023 8:06 AM EST  Subject: Question regarding CEA    What is this Dr Sarahy Richey?

## 2023-02-10 NOTE — TELEPHONE ENCOUNTER
Spoke to Beba Sorto at [facility]. Verbalizes that CT will be done with liver protocol tomorrow.

## 2023-02-11 ENCOUNTER — HOSPITAL ENCOUNTER (OUTPATIENT)
Dept: CT IMAGING | Age: 73
Discharge: HOME | End: 2023-02-11
Payer: MEDICARE

## 2023-02-11 DIAGNOSIS — R16.0 LIVER MASS: ICD-10-CM

## 2023-02-11 PROCEDURE — 6360000004 HC RX CONTRAST MEDICATION: Performed by: FAMILY MEDICINE

## 2023-02-11 PROCEDURE — 71260 CT THORAX DX C+: CPT

## 2023-02-11 RX ADMIN — IOPAMIDOL 75 ML: 755 INJECTION, SOLUTION INTRAVENOUS at 10:46

## 2023-02-14 ENCOUNTER — OFFICE VISIT (OUTPATIENT)
Dept: SURGERY | Age: 73
End: 2023-02-14
Payer: MEDICARE

## 2023-02-14 VITALS
TEMPERATURE: 98.7 F | SYSTOLIC BLOOD PRESSURE: 161 MMHG | HEART RATE: 104 BPM | WEIGHT: 163 LBS | BODY MASS INDEX: 26.2 KG/M2 | HEIGHT: 66 IN | DIASTOLIC BLOOD PRESSURE: 80 MMHG

## 2023-02-14 DIAGNOSIS — R16.0 LIVER MASS, RIGHT LOBE: Primary | ICD-10-CM

## 2023-02-14 PROCEDURE — 3078F DIAST BP <80 MM HG: CPT | Performed by: SURGERY

## 2023-02-14 PROCEDURE — G8484 FLU IMMUNIZE NO ADMIN: HCPCS | Performed by: SURGERY

## 2023-02-14 PROCEDURE — 99204 OFFICE O/P NEW MOD 45 MIN: CPT | Performed by: SURGERY

## 2023-02-14 PROCEDURE — G8399 PT W/DXA RESULTS DOCUMENT: HCPCS | Performed by: SURGERY

## 2023-02-14 PROCEDURE — G8427 DOCREV CUR MEDS BY ELIG CLIN: HCPCS | Performed by: SURGERY

## 2023-02-14 PROCEDURE — 3074F SYST BP LT 130 MM HG: CPT | Performed by: SURGERY

## 2023-02-14 PROCEDURE — 3017F COLORECTAL CA SCREEN DOC REV: CPT | Performed by: SURGERY

## 2023-02-14 PROCEDURE — G8417 CALC BMI ABV UP PARAM F/U: HCPCS | Performed by: SURGERY

## 2023-02-14 PROCEDURE — 1090F PRES/ABSN URINE INCON ASSESS: CPT | Performed by: SURGERY

## 2023-02-14 PROCEDURE — 1036F TOBACCO NON-USER: CPT | Performed by: SURGERY

## 2023-02-14 PROCEDURE — 1123F ACP DISCUSS/DSCN MKR DOCD: CPT | Performed by: SURGERY

## 2023-02-14 PROCEDURE — G9708 BILAT MAST/HX BI /UNILAT MAS: HCPCS | Performed by: SURGERY

## 2023-02-15 ENCOUNTER — TELEPHONE (OUTPATIENT)
Dept: SURGERY | Age: 73
End: 2023-02-15

## 2023-02-16 ENCOUNTER — HOSPITAL ENCOUNTER (OUTPATIENT)
Dept: CT IMAGING | Age: 73
Discharge: HOME | End: 2023-02-16
Payer: MEDICARE

## 2023-02-16 VITALS
DIASTOLIC BLOOD PRESSURE: 65 MMHG | SYSTOLIC BLOOD PRESSURE: 118 MMHG | OXYGEN SATURATION: 97 % | RESPIRATION RATE: 16 BRPM | TEMPERATURE: 97.3 F | HEART RATE: 78 BPM

## 2023-02-16 DIAGNOSIS — R16.0 LIVER MASS, RIGHT LOBE: ICD-10-CM

## 2023-02-16 LAB
APTT: 28.8 SEC (ref 23–34.3)
INR BLD: 1.04 (ref 0.87–1.14)
PLATELET # BLD: 364 K/UL (ref 135–450)
PROTHROMBIN TIME: 13.5 SEC (ref 11.7–14.5)

## 2023-02-16 PROCEDURE — 2500000003 HC RX 250 WO HCPCS: Performed by: RADIOLOGY

## 2023-02-16 PROCEDURE — 36415 COLL VENOUS BLD VENIPUNCTURE: CPT

## 2023-02-16 PROCEDURE — 6370000000 HC RX 637 (ALT 250 FOR IP): Performed by: RADIOLOGY

## 2023-02-16 PROCEDURE — 6360000002 HC RX W HCPCS: Performed by: RADIOLOGY

## 2023-02-16 PROCEDURE — 7100000011 HC PHASE II RECOVERY - ADDTL 15 MIN

## 2023-02-16 PROCEDURE — 88341 IMHCHEM/IMCYTCHM EA ADD ANTB: CPT

## 2023-02-16 PROCEDURE — 88360 TUMOR IMMUNOHISTOCHEM/MANUAL: CPT

## 2023-02-16 PROCEDURE — 88342 IMHCHEM/IMCYTCHM 1ST ANTB: CPT

## 2023-02-16 PROCEDURE — 85049 AUTOMATED PLATELET COUNT: CPT

## 2023-02-16 PROCEDURE — 85730 THROMBOPLASTIN TIME PARTIAL: CPT

## 2023-02-16 PROCEDURE — G1010 CDSM STANSON: HCPCS

## 2023-02-16 PROCEDURE — 88307 TISSUE EXAM BY PATHOLOGIST: CPT

## 2023-02-16 PROCEDURE — 7100000010 HC PHASE II RECOVERY - FIRST 15 MIN

## 2023-02-16 PROCEDURE — 85610 PROTHROMBIN TIME: CPT

## 2023-02-16 RX ORDER — MIDAZOLAM HYDROCHLORIDE 1 MG/ML
INJECTION INTRAMUSCULAR; INTRAVENOUS
Status: COMPLETED | OUTPATIENT
Start: 2023-02-16 | End: 2023-02-16

## 2023-02-16 RX ORDER — LIDOCAINE HYDROCHLORIDE 10 MG/ML
INJECTION, SOLUTION EPIDURAL; INFILTRATION; INTRACAUDAL; PERINEURAL
Status: COMPLETED | OUTPATIENT
Start: 2023-02-16 | End: 2023-02-16

## 2023-02-16 RX ORDER — FENTANYL CITRATE 50 UG/ML
INJECTION, SOLUTION INTRAMUSCULAR; INTRAVENOUS
Status: COMPLETED | OUTPATIENT
Start: 2023-02-16 | End: 2023-02-16

## 2023-02-16 RX ORDER — ACETAMINOPHEN 325 MG/1
650 TABLET ORAL EVERY 4 HOURS PRN
Status: DISCONTINUED | OUTPATIENT
Start: 2023-02-16 | End: 2023-02-17 | Stop reason: HOSPADM

## 2023-02-16 RX ADMIN — LIDOCAINE HYDROCHLORIDE 10 ML: 10 INJECTION, SOLUTION EPIDURAL; INFILTRATION; INTRACAUDAL; PERINEURAL at 11:41

## 2023-02-16 RX ADMIN — FENTANYL CITRATE 50 MCG: 50 INJECTION, SOLUTION INTRAMUSCULAR; INTRAVENOUS at 11:38

## 2023-02-16 RX ADMIN — MIDAZOLAM HYDROCHLORIDE 1 MG: 2 INJECTION, SOLUTION INTRAMUSCULAR; INTRAVENOUS at 11:38

## 2023-02-16 RX ADMIN — ACETAMINOPHEN 650 MG: 325 TABLET ORAL at 12:27

## 2023-02-16 ASSESSMENT — PAIN SCALES - GENERAL
PAINLEVEL_OUTOF10: 4
PAINLEVEL_OUTOF10: 0
PAINLEVEL_OUTOF10: 0

## 2023-02-16 ASSESSMENT — PAIN DESCRIPTION - DESCRIPTORS: DESCRIPTORS: SORE

## 2023-02-16 ASSESSMENT — PAIN DESCRIPTION - LOCATION: LOCATION: ABDOMEN

## 2023-02-16 ASSESSMENT — PAIN DESCRIPTION - ORIENTATION: ORIENTATION: RIGHT

## 2023-02-16 ASSESSMENT — PAIN - FUNCTIONAL ASSESSMENT: PAIN_FUNCTIONAL_ASSESSMENT: 0-10

## 2023-02-16 NOTE — BRIEF OP NOTE
Brief Postoperative Note    Felipe Santana  YOB: 1950  [id number]    Pre-operative Diagnosis:  Liver mass in need of liver biopsy.     Post-operative Diagnosis: Same    Procedure: CT guided liver mass biopsy    Anesthesia: Moderate    Surgeons/Assistants: Ailyn Chandra    Estimated Blood Loss: Minimal    Complications: none    Specimens: were obtained      Ailyn Chandra MD MD  2/16/2023

## 2023-02-16 NOTE — PROGRESS NOTES
1350: Biopsy site C, D & I. Pt reports no pain. 1330: Biopsy site C, D & I. Pt reports no pain. 1300: Biopsy site C, D & I. VSS. Pt reports some pain, but improving. 1250: Biopsy site C, D & I. VSS. Pt reports pain improving. Ambulatory Surgery/Procedure Discharge Note    Vitals:    02/16/23 1352   BP: 118/65   Pulse: 78   Resp: 16   Temp: 97.3   SpO2: 97%       In: 240 [P.O.:240]  Out: -     Restroom use offered before discharge. Yes    Pain assessment:  level of pain (1-10, 10 severe),   Pain Level: 0    Pt and S.O./family states \"ready to go home\". Pt alert and oriented x4. IV removed. Denies N/V or pain. Dressing C, D & I.  Voided prior to discharge. Discharge instructions given to pt and  with pt permission. Pt and  verbalized understanding of all instructions. Left with all belongings and discharge instructions. Patient discharged to home/self care.  Patient discharged via wheel chair by transporter to waiting family/S.O.       2/16/2023 1:56 PM

## 2023-02-16 NOTE — H&P
IR  H & P      Patient:  Ang Villarreal   :   1950      Relevant patient history reviewed and discussed. CC: Large liver mass in need of biopsy. The procedure including risks and benefits was discussed at length with the patient (or designated family member) and all questions were answered. Informed consent to proceed with the procedure was given. Heart : regular rate and rhythm  Lungs : clear, breathing easily  Airway Assessment: Mallampati 3  Condition : stable    Elvira Scale: Activity:  2 - Able to move 4 extremities voluntarily on command  Respiration:  2 - Able to breathe deeply and cough freely  Circulation:  2 - BP+/- 20mmHg of normal  Consciousness:  2 - Fully awake  Oxygen Saturation (color):  2 - Able to maintain oxygen saturation >92% on room air      [facility] reviewed and agreed. Medications reviewed. Current Outpatient Medications on File Prior to Encounter   Medication Sig Dispense Refill    lisinopril (PRINIVIL;ZESTRIL) 20 MG tablet TAKE ONE TABLET BY MOUTH DAILY 90 tablet 1    simvastatin (ZOCOR) 20 MG tablet TAKE ONE TABLET BY MOUTH DAILY 90 tablet 1    venlafaxine (EFFEXOR XR) 75 MG extended release capsule TAKE ONE CAPSULE BY MOUTH DAILY 90 capsule 3    amLODIPine (NORVASC) 5 MG tablet TAKE ONE TABLET BY MOUTH DAILY 90 tablet 3    metoprolol tartrate (LOPRESSOR) 25 MG tablet TAKE ONE TABLET BY MOUTH TWICE A  tablet 3    Cholecalciferol (VITAMIN D3) 2000 units CAPS Take 2,000 Units by mouth daily      Probiotic Product (PROBIOTIC DAILY PO) Take by mouth      fluticasone (FLONASE) 50 MCG/ACT nasal spray INSTILL 1 SPRAY INTO THE NOSTRILS DAILY 1 Bottle 0    Omega-3 Fatty Acids (FISH OIL PO) Take  by mouth. Multiple Vitamins-Minerals (THERAPEUTIC MULTIVITAMIN-MINERALS) tablet Take 1 tablet by mouth daily. Ascorbic Acid (VITAMIN C) 500 MG tablet Take 500 mg by mouth daily.        No current facility-administered medications on file prior to encounter.      Allergies: No Known Allergies  Sedation : Moderate sedation planned  ASA 1 - Normal health patient

## 2023-02-16 NOTE — DISCHARGE INSTRUCTIONS
LIVER BIOPSY DISCHARGE INSTRUCTIONS    Post-procedure Care    Biopsy results typically take 2-3 business days. Results will be sent to the doctor that ordered the test.     [address]  A responsible person should be with you for the next 24 hours. Please follow the instructions checked below:    ACTIVITY INSTRUCTIONS:  [x]Rest today. Increase activity as tolerated    [x]No heavy lifting or strenuous activity x 1 week    [x]No driving today or as instructed by your doctor  []Other   WOUND/DRESSING INSTRUCTIONS:     Always clean your hands before and after caring for the wound. [x]May shower today  [x]Avoid tub baths, hot tub, swimming pool etc. (do not submerge site in water) For 1 week to prevent infection     [x]Remove dressing in 2 days, may apply band-aid                            []Other       MEDICATION INSTRUCTIONS:  [x]Resume your prescibed medications    [x]You may take a non-prescription headache remedy, preferably one that does not contain aspirin. [x]Give a list of your medications to your primary care physician on your next visit. Keep your medication list updated and carry it with in case of emergencies. IF YOU TAKE ANTICOAGULANTS:  You may typically re-start blood thinning drugs the day after your procedure UNLESS directed otherwise by the doctor who prescribes the drug for you.   Some common blood thinners are:  Anti-inflammatory drugs (motrin, aleve)     Aspirin  Anti-coagulants such as plavix (clopidogrel) or coumadin (warfarin)    After arriving home, contact your doctor if any of the following occurs:   Signs of infection, including fever and chills  Redness, swelling, increasing pain, excessive bleeding, or any discharge from the incision site  Severe abdominal pain, persistent nausea or vomiting  A faint or light-headed feeling  Severe shoulder pain (right shoulder aching is typical of liver biopsy and should resolve in a day or two) Trouble breathing or chest pain  In case of an emergency, CALL 911. FOLLOW-UP CARE:           Follow up with Dr. Juliet Anderson MD for results and appointment              Radiology Department  [address]:  [phone] M-F 7AM-6PM        If you smoke STOP. We care about your health!

## 2023-02-16 NOTE — SEDATION DOCUMENTATION
IMAGING SERVICES NURSING PROGRESS NOTE    Procedure:  Liver bx  February 16, 2023  Viji Sam      Allergies:  No Known Allergies    Vitals:    02/16/23 1149   BP: 131/81   Pulse: 78   Resp: 15   Temp:    SpO2: 98%       Recent lab work reviewed with MD: yes   Procedure explained to patient by MD: yes   Informed consent obtained:yes  Family with patient: yes    Mental Status:  Normal  Readiness to learn:  Yes  Barriers to learning: No    Pain Assessment Pre-Procedure:  Pain Present:  yes  Pain Score:  2  Pain Quality/Description:      Time out Procedure Verification with:  [x] RN  [x] Physician  [x] Patient  [x] Other: CT Technologist  Procedure site marked, if applicable:  Yes    Note: Patient arrived A & O x 4, denies pain, breathing easily on room air, Spoke to Dr. Crow Kumar prior to procedure. Procedural sedation:  Fentanyl:  50 mcg  Versed:    1 mg  Post Procedureal Note:  Patient tolerated procedure well. Breathing easily on room air. Report given to Hermann Velez RN. Patient transported in stable conditon to room 3.     Pain Assessment Post-Procedure:  Pain Present:  no  Pain Score:  0  Pain Quality/Description:  none    Plan of Care Goals:  Safety measures met:  Yes  Patient understands explanation of procedure:  Yes    Time in:  1139  Time out:  Darcy Reynolds RN.  R.N. 2/16/2023

## 2023-02-21 ENCOUNTER — TELEPHONE (OUTPATIENT)
Dept: SURGERY | Age: 73
End: 2023-02-21

## 2023-02-21 NOTE — TELEPHONE ENCOUNTER
[facility], however appt was prior to biopsy results. Is having PET scan, and will follow-up with us immediately after PET. Pt asked if she should schedule colonoscopy- recommend she move forward with scheduling. Will call us with PET date when available. Instructed to call with any questions or concerns. Verbalized understanding.

## 2023-02-22 ENCOUNTER — TELEPHONE (OUTPATIENT)
Dept: SURGERY | Age: 73
End: 2023-02-22

## 2023-02-22 NOTE — TELEPHONE ENCOUNTER
Patient called in stating she has scheduled her PET scan with [address] for [email or web address] at [facility]    For any additional information please call Love Chilel at [phone]

## 2023-02-22 NOTE — TELEPHONE ENCOUNTER
Patient called in stating she has scheduled her colonoscopy with Dr Ricardo Christianson at the [place] location on 3/1/23.      For any additional information please call Eamon Conway at [phone]

## 2023-02-28 ENCOUNTER — HOSPITAL ENCOUNTER (OUTPATIENT)
Dept: CT IMAGING | Age: 73
Discharge: HOME | End: 2023-02-28

## 2023-02-28 ENCOUNTER — OFFICE VISIT (OUTPATIENT)
Dept: PRIMARY CARE CLINIC | Age: 73
End: 2023-02-28
Payer: MEDICARE

## 2023-02-28 VITALS
HEIGHT: 66 IN | HEART RATE: 66 BPM | DIASTOLIC BLOOD PRESSURE: 66 MMHG | BODY MASS INDEX: 26.42 KG/M2 | OXYGEN SATURATION: 99 % | SYSTOLIC BLOOD PRESSURE: 125 MMHG | WEIGHT: 164.4 LBS

## 2023-02-28 DIAGNOSIS — D3A.00 CARCINOID TUMOR, UNSPECIFIED SITE, UNSPECIFIED WHETHER MALIGNANT: ICD-10-CM

## 2023-02-28 DIAGNOSIS — H92.02 ACUTE EAR PAIN, LEFT: Primary | ICD-10-CM

## 2023-02-28 DIAGNOSIS — D3A.8 NEUROENDOCRINE TUMOR OF LIVER: ICD-10-CM

## 2023-02-28 DIAGNOSIS — K80.20 CALCULUS OF GALLBLADDER WITHOUT CHOLECYSTITIS WITHOUT OBSTRUCTION: ICD-10-CM

## 2023-02-28 PROCEDURE — 3078F DIAST BP <80 MM HG: CPT | Performed by: FAMILY MEDICINE

## 2023-02-28 PROCEDURE — G8399 PT W/DXA RESULTS DOCUMENT: HCPCS | Performed by: FAMILY MEDICINE

## 2023-02-28 PROCEDURE — G8484 FLU IMMUNIZE NO ADMIN: HCPCS | Performed by: FAMILY MEDICINE

## 2023-02-28 PROCEDURE — G8427 DOCREV CUR MEDS BY ELIG CLIN: HCPCS | Performed by: FAMILY MEDICINE

## 2023-02-28 PROCEDURE — G8417 CALC BMI ABV UP PARAM F/U: HCPCS | Performed by: FAMILY MEDICINE

## 2023-02-28 PROCEDURE — 1123F ACP DISCUSS/DSCN MKR DOCD: CPT | Performed by: FAMILY MEDICINE

## 2023-02-28 PROCEDURE — 1090F PRES/ABSN URINE INCON ASSESS: CPT | Performed by: FAMILY MEDICINE

## 2023-02-28 PROCEDURE — G9708 BILAT MAST/HX BI /UNILAT MAS: HCPCS | Performed by: FAMILY MEDICINE

## 2023-02-28 PROCEDURE — 1036F TOBACCO NON-USER: CPT | Performed by: FAMILY MEDICINE

## 2023-02-28 PROCEDURE — 3017F COLORECTAL CA SCREEN DOC REV: CPT | Performed by: FAMILY MEDICINE

## 2023-02-28 PROCEDURE — 3074F SYST BP LT 130 MM HG: CPT | Performed by: FAMILY MEDICINE

## 2023-02-28 PROCEDURE — 99214 OFFICE O/P EST MOD 30 MIN: CPT | Performed by: FAMILY MEDICINE

## 2023-02-28 ASSESSMENT — PATIENT HEALTH QUESTIONNAIRE - PHQ9
10. IF YOU CHECKED OFF ANY PROBLEMS, HOW DIFFICULT HAVE THESE PROBLEMS MADE IT FOR YOU TO DO YOUR WORK, TAKE CARE OF THINGS AT HOME, OR GET ALONG WITH OTHER PEOPLE: 0
7. TROUBLE CONCENTRATING ON THINGS, SUCH AS READING THE NEWSPAPER OR WATCHING TELEVISION: 0
SUM OF ALL RESPONSES TO PHQ QUESTIONS 1-9: 0
8. MOVING OR SPEAKING SO SLOWLY THAT OTHER PEOPLE COULD HAVE NOTICED. OR THE OPPOSITE, BEING SO FIGETY OR RESTLESS THAT YOU HAVE BEEN MOVING AROUND A LOT MORE THAN USUAL: 0
SUM OF ALL RESPONSES TO PHQ9 QUESTIONS 1 & 2: 0
6. FEELING BAD ABOUT YOURSELF - OR THAT YOU ARE A FAILURE OR HAVE LET YOURSELF OR YOUR FAMILY DOWN: 0
SUM OF ALL RESPONSES TO PHQ QUESTIONS 1-9: 0
1. LITTLE INTEREST OR PLEASURE IN DOING THINGS: 0
9. THOUGHTS THAT YOU WOULD BE BETTER OFF DEAD, OR OF HURTING YOURSELF: 0
2. FEELING DOWN, DEPRESSED OR HOPELESS: 0
5. POOR APPETITE OR OVEREATING: 0
4. FEELING TIRED OR HAVING LITTLE ENERGY: 0
SUM OF ALL RESPONSES TO PHQ QUESTIONS 1-9: 0
SUM OF ALL RESPONSES TO PHQ QUESTIONS 1-9: 0
3. TROUBLE FALLING OR STAYING ASLEEP: 0

## 2023-02-28 NOTE — PROGRESS NOTES
Chief Complaint   Patient presents with    1 Month Follow-Up       HPI: Gee Polanco  presents for evaluation and management of liver mass. Gee Polanco returns to clinic. She notes she is seeing Dr. Karishma Sanford in Dr. Harmeet Art for her liver mass. She underwent a biopsy which showed well-differentiated neuroendocrine tumor and she had elevated tumor markers. She is pleased as this is better than what she was thinking she would be facing. She notes she is on a clear liquid diet today because she is going to get endoscopy through Dr. Bennett Gilliam tomorrow. She notes a 2-day history of left ear pain and would like me to look in her ear. Today's PHQ:    PHQ Scores 2/28/2023 11/16/2022 11/16/2022 11/10/2022 7/25/2022 1/24/2022 7/26/2021   PHQ2 Score 0 0 0 0 0 1 0   PHQ9 Score 0 0 0 0 0 2 1     Interpretation of Total Score Depression Severity: 1-4 = Minimal depression, 5-9 = Mild depression, 10-14 = Moderate depression, 15-19 = Moderately severe depression, 20-27 = Severe depression        Review of Systems    No Known Allergies  New Prescriptions    No medications on file     Current Outpatient Medications   Medication Sig Dispense Refill    lisinopril (PRINIVIL;ZESTRIL) 20 MG tablet TAKE ONE TABLET BY MOUTH DAILY 90 tablet 1    simvastatin (ZOCOR) 20 MG tablet TAKE ONE TABLET BY MOUTH DAILY 90 tablet 1    venlafaxine (EFFEXOR XR) 75 MG extended release capsule TAKE ONE CAPSULE BY MOUTH DAILY 90 capsule 3    amLODIPine (NORVASC) 5 MG tablet TAKE ONE TABLET BY MOUTH DAILY 90 tablet 3    metoprolol tartrate (LOPRESSOR) 25 MG tablet TAKE ONE TABLET BY MOUTH TWICE A  tablet 3    Cholecalciferol (VITAMIN D3) 2000 units CAPS Take 2,000 Units by mouth daily      Probiotic Product (PROBIOTIC DAILY PO) Take by mouth      fluticasone (FLONASE) 50 MCG/ACT nasal spray INSTILL 1 SPRAY INTO THE NOSTRILS DAILY 1 Bottle 0    Omega-3 Fatty Acids (FISH OIL PO) Take  by mouth.       Multiple Vitamins-Minerals (THERAPEUTIC MULTIVITAMIN-MINERALS) tablet Take 1 tablet by mouth daily. Ascorbic Acid (VITAMIN C) 500 MG tablet Take 500 mg by mouth daily. No current facility-administered medications for this visit.        Past Medical History:   Diagnosis Date    Allergic rhinitis     Basal cell cancer May 2014    Forehead: Dr. Roxane Enamorado    BRCA positive     Depressive disorder     Hearing loss     HTN (hypertension)     Hx of bilateral mastectomy 7/27/2015    Hypercholesterolemia     Malignant neoplasm of female breast [facility])     s/p B Mastectomy - Dr. Shelby Brown 2004    Mild aortic sclerosis 01/17/2019    ECHO 01/17/19    Non-thrombocytopenic purpura ([address]     Osteopenia     DXA 10/12/11 Improving densitiy L hip    Skin lesion     4mm lesion L nose- dr. Roxane Enamorado - (benign)    Stress incontinence 10/5/2012    Trace aortic valve regurgitation 01/17/2019    ECHO 01/17/19    Vertigo          Objective   /66 (Site: Left Upper Arm, Position: Sitting, Cuff Size: Large Adult)   Pulse 66   Ht 5' 6\" (1.676 m)   Wt 164 lb 6.4 oz (74.6 kg)   SpO2 99%   BMI 26.53 kg/m²   Wt Readings from Last 3 Encounters:   02/28/23 164 lb 6.4 oz (74.6 kg)   02/14/23 163 lb (73.9 kg)   02/09/23 162 lb 3.2 oz (73.6 kg)       Physical Exam  HENT:      Right Ear: Tympanic membrane and ear canal normal.      Left Ear: Tympanic membrane and ear canal normal.         Chemistry        Component Value Date/Time     02/07/2023 0855    K 4.0 02/07/2023 0855    CL 98 (L) 02/07/2023 0855    CO2 27 02/07/2023 0855    BUN 16 02/07/2023 0855    CREATININE 0.7 02/07/2023 0855        Component Value Date/Time    CALCIUM 9.8 02/07/2023 0855    ALKPHOS 128 02/07/2023 0855    AST 28 02/07/2023 0855    ALT 20 02/07/2023 0855    BILITOT 0.5 02/07/2023 0855          Lab Results   Component Value Date    WBC 11.1 (H) 02/07/2023    HGB 13.5 02/07/2023    HCT 39.8 02/07/2023    MCV 90.2 02/07/2023     02/16/2023     Lab Results   Component Value Date    LABA1C 5.2 07/26/2021     No results found for: EAG  Lab Results   Component Value Date    LABA1C 5.2 07/26/2021     No components found for: CHLPL  Lab Results   Component Value Date    TRIG 131 01/31/2023    TRIG 86 01/27/2022    TRIG 121 11/16/2020     Lab Results   Component Value Date    HDL 85 (H) 01/31/2023    HDL 90 (H) 01/27/2022    HDL 76 (H) 11/16/2020     Lab Results   Component Value Date    LDLCALC 89 01/31/2023    LDLCALC 93 01/27/2022    LDLCALC 116 (H) 11/16/2020     Lab Results   Component Value Date    LABVLDL 26 01/31/2023    LABVLDL 17 01/27/2022    LABVLDL 24 11/16/2020     Lab Results   Component Value Date    CEA 46.6 (H) 02/09/2023     AFP (2/9/23)  9.4          CT Chest Abd Pelvis (2/9/23): Impression   1. Negative CT of the chest.   2. 10 cm hepatic mass. The differential includes hepatocellular carcinoma,   cholangiocarcinoma and metastatic disease. Liver Biopsy 2/16/23:  Liver, core biopsy:   -  Well-differentiated neuroendocrine tumor, grade 2. Comment: The histologic sections show cores of liver tissue involved by   tumor, with the tumor cells present in nests. There is no tumor   necrosis, significant nuclear atypia or increased mitotic activity. Immunostains show the tumor cells are positive for synaptophysin,   chromogranin, and are negative for TTF-1 and CDX2. Ki-67 shows a   proliferative index of about 5%. The tumor may represent primary or   metastatic neuroendocrine tumor. Clinical and radiologic correlation is   suggested. Assessment   Plan   1. Calculus of gallbladder without cholecystitis without obstruction  Expect patient will undergo cholecystectomy when she gets surgery on her liver tumor. Patient is pretty much asymptomatic at this time    2. Neuroendocrine tumor of liver: Dr. Thien Borja  Reviewed pathology with patient. I am hopeful that this will behave in a more benign manner. Continue follow-up with Dr. Lora Stovall and Gail Bourgeois any    3.  Acute ear pain, left  Suspect eustachian tube dysfunction. Counseled monitoring      RTC No follow-ups on file.

## 2023-03-06 NOTE — PROGRESS NOTES
size and location of tumor. Discussed with Dr. Bhupendra Crandall. Emphasized that there is no rush for surgery. Recommend she consult with radiologist and radiation oncologist for liver-directed therapy discussion.      Abdoul Palmer MD  Surgery Attending

## 2023-03-08 PROBLEM — C7A.00 MALIGNANT CARCINOID TUMOR (HCC): Status: ACTIVE | Noted: 2023-03-08

## 2023-03-10 ENCOUNTER — OFFICE VISIT (OUTPATIENT)
Dept: SURGERY | Age: 73
End: 2023-03-10
Payer: MEDICARE

## 2023-03-10 VITALS
BODY MASS INDEX: 26.48 KG/M2 | DIASTOLIC BLOOD PRESSURE: 74 MMHG | HEART RATE: 66 BPM | TEMPERATURE: 98 F | SYSTOLIC BLOOD PRESSURE: 127 MMHG | WEIGHT: 164.8 LBS | HEIGHT: 66 IN

## 2023-03-10 DIAGNOSIS — D3A.8 NEUROENDOCRINE TUMOR OF LIVER: Primary | ICD-10-CM

## 2023-03-10 DIAGNOSIS — D3A.8 NEUROENDOCRINE TUMOR OF LIVER: ICD-10-CM

## 2023-03-10 DIAGNOSIS — K80.20 CALCULUS OF GALLBLADDER WITHOUT CHOLECYSTITIS WITHOUT OBSTRUCTION: ICD-10-CM

## 2023-03-10 DIAGNOSIS — R16.0 LIVER MASS, RIGHT LOBE: ICD-10-CM

## 2023-03-10 PROCEDURE — G8484 FLU IMMUNIZE NO ADMIN: HCPCS | Performed by: SURGERY

## 2023-03-10 PROCEDURE — 3078F DIAST BP <80 MM HG: CPT | Performed by: SURGERY

## 2023-03-10 PROCEDURE — G8427 DOCREV CUR MEDS BY ELIG CLIN: HCPCS | Performed by: SURGERY

## 2023-03-10 PROCEDURE — 99215 OFFICE O/P EST HI 40 MIN: CPT | Performed by: SURGERY

## 2023-03-10 PROCEDURE — 1036F TOBACCO NON-USER: CPT | Performed by: SURGERY

## 2023-03-10 PROCEDURE — G8417 CALC BMI ABV UP PARAM F/U: HCPCS | Performed by: SURGERY

## 2023-03-10 PROCEDURE — G9708 BILAT MAST/HX BI /UNILAT MAS: HCPCS | Performed by: SURGERY

## 2023-03-10 PROCEDURE — 3017F COLORECTAL CA SCREEN DOC REV: CPT | Performed by: SURGERY

## 2023-03-10 PROCEDURE — G8399 PT W/DXA RESULTS DOCUMENT: HCPCS | Performed by: SURGERY

## 2023-03-10 PROCEDURE — 1123F ACP DISCUSS/DSCN MKR DOCD: CPT | Performed by: SURGERY

## 2023-03-10 PROCEDURE — 1090F PRES/ABSN URINE INCON ASSESS: CPT | Performed by: SURGERY

## 2023-03-10 PROCEDURE — 3074F SYST BP LT 130 MM HG: CPT | Performed by: SURGERY

## 2023-03-13 ENCOUNTER — TELEPHONE (OUTPATIENT)
Dept: SURGERY | Age: 73
End: 2023-03-13

## 2023-03-13 LAB — CHROMOGRANIN A: 48 NG/ML (ref 0–103)

## 2023-03-13 NOTE — TELEPHONE ENCOUNTER
Patient called in wanting to know if she needs to see Dr. Beverly Modi again this week    Please contact the patient at [phone]

## 2023-03-16 ENCOUNTER — TELEPHONE (OUTPATIENT)
Dept: SURGERY | Age: 73
End: 2023-03-16

## 2023-03-16 NOTE — TELEPHONE ENCOUNTER
Patient called to ask what the next steps in her treatment will be. What will happen with her gallbladder? Will the Dr's doing the therasphere (Y-90) be in getting in touch with her or does need to call someone?     Please call: [phone]

## 2023-03-16 NOTE — TELEPHONE ENCOUNTER
Returned call to Alanna.     Informed her that radiology will be contacting her for consult appointment.     Discussed cholecystectomy timing. Encouraged her to keep track of symptoms and notify us if she develops post-prandial symptoms/worsening of symptoms. Verbalizes understanding.

## 2023-03-18 DIAGNOSIS — E78.00 HYPERCHOLESTEROLEMIA: Primary | ICD-10-CM

## 2023-03-20 RX ORDER — SIMVASTATIN 20 MG
TABLET ORAL
Qty: 90 TABLET | Refills: 1 | Status: SHIPPED | OUTPATIENT
Start: 2023-03-20

## 2023-03-20 NOTE — TELEPHONE ENCOUNTER
Medication:   Requested Prescriptions     Pending Prescriptions Disp Refills    simvastatin (ZOCOR) 20 MG tablet [Pharmacy Med Name: SIMVASTATIN 20 MG TABLET] 90 tablet 1     Sig: TAKE ONE TABLET BY MOUTH DAILY      09/20/22  Last Filled:      Patient Phone Number: [phone] (home) [phone] (work)    Last appt: 2/28/2023   Next appt: 4/11/2023    Last OARRS: No flowsheet data found.

## 2023-04-10 ENCOUNTER — TELEPHONE (OUTPATIENT)
Dept: CARDIAC CATH/INVASIVE PROCEDURES | Age: 73
End: 2023-04-10

## 2023-04-19 ENCOUNTER — HOSPITAL ENCOUNTER (OUTPATIENT)
Dept: INTERVENTIONAL RADIOLOGY/VASCULAR | Age: 73
Discharge: HOME | End: 2023-04-19

## 2023-04-19 ENCOUNTER — TELEPHONE (OUTPATIENT)
Dept: CARDIAC CATH/INVASIVE PROCEDURES | Age: 73
End: 2023-04-19

## 2023-04-19 DIAGNOSIS — K76.9 LIVER LESION: Primary | ICD-10-CM

## 2023-04-20 NOTE — H&P
Interventional Radiology consultation for Y90 Therasphere Radioembolization    Date of consult : ***    Referring physician : ***    Oncologist : ***    Cancer type : ***    Abdominal Imaging :  ***    Chemotherapy history : ***    Prior liver surgery : none    Prior radiation to the liver : none    Prior biliary procedure, ERCP, sphincterotomy, or gastric bypass : none    Imaging or biopsy evidence of cirrhosis : ***   If cirrhotic : MELD *** Child Espino *** ( IDEAL < 8 )    Ascites : none    Dialysis : none    Anticoagulation : none    ECOG score :  0 Full activity    1 Light restriction    2 Mobile > 50% of the day    3 Mobile < 50% of the day    4 Confined to bed    Laboratory evaluation :    Sodium  ***     Creatinine  ***     INR   ***     Platelets  ***     Albumin  ***   ( IDEAL > 2.5 )    Total bilirubin  ***   ( IDEAL < 2 for lobar treatment ; < 3 for segmentectomy )     Plan :   ***

## 2023-04-21 ENCOUNTER — HOSPITAL ENCOUNTER (OUTPATIENT)
Dept: CT IMAGING | Age: 73
Discharge: HOME | End: 2023-04-21
Payer: MEDICARE

## 2023-04-21 DIAGNOSIS — K76.9 LIVER LESION: ICD-10-CM

## 2023-04-21 LAB
PERFORMED ON: NORMAL
PERFORMED ON: NORMAL
POC CREATININE: 0.8 MG/DL (ref 0.6–1.2)
POC SAMPLE TYPE: NORMAL
POC SAMPLE TYPE: NORMAL

## 2023-04-21 PROCEDURE — 82565 ASSAY OF CREATININE: CPT

## 2023-04-21 PROCEDURE — 6360000004 HC RX CONTRAST MEDICATION: Performed by: INTERNAL MEDICINE

## 2023-04-21 PROCEDURE — 74175 CTA ABDOMEN W/CONTRAST: CPT

## 2023-04-21 RX ADMIN — IOPAMIDOL 75 ML: 755 INJECTION, SOLUTION INTRAVENOUS at 12:54

## 2023-04-25 NOTE — PROGRESS NOTES
Attempted to call patient about procedure. No answer. Voicemail left. Told to be here at 0630 for procedure at 0700. NPO after midnight, but can take morning medication with sips of water. Office should have told them when and if they should stop any blood thinners. Told to have a responsible adult be with the patient to take them home and stay with them afterwards, if they are not admitted to hospital afterwards. And if available bring current list of medications.

## 2023-04-26 ENCOUNTER — HOSPITAL ENCOUNTER (OUTPATIENT)
Dept: INTERVENTIONAL RADIOLOGY/VASCULAR | Age: 73
Discharge: HOME | End: 2023-04-26
Payer: MEDICARE

## 2023-04-26 ENCOUNTER — HOSPITAL ENCOUNTER (OUTPATIENT)
Dept: NUCLEAR MEDICINE | Age: 73
Discharge: HOME | End: 2023-04-26
Payer: MEDICARE

## 2023-04-26 VITALS — BODY MASS INDEX: 25.71 KG/M2 | WEIGHT: 160 LBS | TEMPERATURE: 98.7 F | HEIGHT: 66 IN | RESPIRATION RATE: 16 BRPM

## 2023-04-26 DIAGNOSIS — K76.9 LIVER LESION: ICD-10-CM

## 2023-04-26 LAB
ALBUMIN SERPL-MCNC: 4.5 G/DL (ref 3.4–5)
ALBUMIN/GLOB SERPL: 1.3 {RATIO} (ref 1.1–2.2)
ALP SERPL-CCNC: 152 U/L (ref 40–129)
ALT SERPL-CCNC: 30 U/L (ref 10–40)
ANION GAP SERPL CALCULATED.3IONS-SCNC: 11 MMOL/L (ref 3–16)
AST SERPL-CCNC: 29 U/L (ref 15–37)
BILIRUB SERPL-MCNC: 0.3 MG/DL (ref 0–1)
BUN SERPL-MCNC: 14 MG/DL (ref 7–20)
CALCIUM SERPL-MCNC: 9.8 MG/DL (ref 8.3–10.6)
CHLORIDE SERPL-SCNC: 100 MMOL/L (ref 99–110)
CO2 SERPL-SCNC: 32 MMOL/L (ref 21–32)
CREAT SERPL-MCNC: 0.8 MG/DL (ref 0.6–1.2)
GFR SERPLBLD CREATININE-BSD FMLA CKD-EPI: >60 ML/MIN/{1.73_M2}
GLUCOSE SERPL-MCNC: 113 MG/DL (ref 70–99)
INR PPP: 1.02 (ref 0.84–1.16)
POTASSIUM SERPL-SCNC: 4.2 MMOL/L (ref 3.5–5.1)
PROT SERPL-MCNC: 8 G/DL (ref 6.4–8.2)
PROTHROMBIN TIME: 13.4 SEC (ref 11.5–14.8)
SODIUM SERPL-SCNC: 143 MMOL/L (ref 136–145)

## 2023-04-26 PROCEDURE — C1894 INTRO/SHEATH, NON-LASER: HCPCS

## 2023-04-26 PROCEDURE — 36247 INS CATH ABD/L-EXT ART 3RD: CPT

## 2023-04-26 PROCEDURE — 6360000002 HC RX W HCPCS

## 2023-04-26 PROCEDURE — 75726 ARTERY X-RAYS ABDOMEN: CPT

## 2023-04-26 PROCEDURE — A9540 TC99M MAA: HCPCS | Performed by: STUDENT IN AN ORGANIZED HEALTH CARE EDUCATION/TRAINING PROGRAM

## 2023-04-26 PROCEDURE — 2709999900 HC NON-CHARGEABLE SUPPLY

## 2023-04-26 PROCEDURE — 6360000004 HC RX CONTRAST MEDICATION: Performed by: STUDENT IN AN ORGANIZED HEALTH CARE EDUCATION/TRAINING PROGRAM

## 2023-04-26 PROCEDURE — 78201 LIVER IMAGING STATIC ONLY: CPT

## 2023-04-26 PROCEDURE — 99152 MOD SED SAME PHYS/QHP 5/>YRS: CPT

## 2023-04-26 PROCEDURE — C1887 CATHETER, GUIDING: HCPCS

## 2023-04-26 PROCEDURE — 80053 COMPREHEN METABOLIC PANEL: CPT

## 2023-04-26 PROCEDURE — 76377 3D RENDER W/INTRP POSTPROCES: CPT

## 2023-04-26 PROCEDURE — 36248 INS CATH ABD/L-EXT ART ADDL: CPT

## 2023-04-26 PROCEDURE — 2500000003 HC RX 250 WO HCPCS

## 2023-04-26 PROCEDURE — 85610 PROTHROMBIN TIME: CPT

## 2023-04-26 PROCEDURE — C1769 GUIDE WIRE: HCPCS

## 2023-04-26 PROCEDURE — C1760 CLOSURE DEV, VASC: HCPCS

## 2023-04-26 PROCEDURE — 75774 ARTERY X-RAY EACH VESSEL: CPT

## 2023-04-26 PROCEDURE — 3430000000 HC RX DIAGNOSTIC RADIOPHARMACEUTICAL: Performed by: STUDENT IN AN ORGANIZED HEALTH CARE EDUCATION/TRAINING PROGRAM

## 2023-04-26 PROCEDURE — 99153 MOD SED SAME PHYS/QHP EA: CPT

## 2023-04-26 RX ADMIN — Medication 5.21 MILLICURIE: at 09:30

## 2023-04-26 RX ADMIN — IOHEXOL 70 ML: 350 INJECTION, SOLUTION INTRAVENOUS at 09:02

## 2023-04-26 NOTE — H&P
Patient:  Norma Harding   :   1950      Relevant clinical history, particularly as it involves the pending procedure, was reviewed and discussed. The procedure including risks and benefits was discussed at length with the patient (or designated family member) and all questions were answered. Informed consent to proceed with the procedure was given. Vital signs were monitored and documented by the Radiology nurse. Targeted physical examination  Heart : regular rate and rhythm  Lungs : clear, breathing easily  Condition : stable    [facility] nurses notes reviewed and agreed. Past Medical History:        Diagnosis Date    Allergic rhinitis     Basal cell cancer May 2014    Forehead: Dr. Mounika Mckeon    BRCA positive     Calculus of gallbladder without cholecystitis without obstruction 2023    Depressive disorder     Hearing loss     HTN (hypertension)     Hx of bilateral mastectomy 2015    Hypercholesterolemia     Malignant neoplasm of female breast [facility])     s/p B Mastectomy - Dr. Jie Shah     Mild aortic sclerosis 2019    ECHO 19    Neuroendocrine tumor of liver 2023    Non-thrombocytopenic purpura ([address]     Osteopenia     DXA 10/12/11 Improving densitiy L hip    Skin lesion     4mm lesion L nose- dr. Mounika Mckeon - (benign)    Stress incontinence 10/5/2012    Trace aortic valve regurgitation 2019    ECHO 19    Vertigo        Past Surgical History:           Procedure Laterality Date    APPENDECTOMY  1975    COLONOSCOPY  2006    CT NEEDLE BIOPSY LIVER PERCUTANEOUS  2023    CT NEEDLE BIOPSY LIVER PERCUTANEOUS 2023 [facility] CT SCAN    HYSTERECTOMY (CERVIX STATUS UNKNOWN)  1993    KALIN/BSO    MASTECTOMY Bilateral 2004    Bilateral    OVARY REMOVAL         Allergies:  Patient has no known allergies.     Medications:   Home Meds  Current Outpatient Medications on File Prior to Encounter   Medication Sig Dispense Refill    simvastatin

## 2023-04-26 NOTE — PROCEDURES
Interventional Radiology Post Procedure    Date: 4/26/2023    Physician: Jett Hernández MD    Pre-op Diagnosis: neuroendocrine tumor    Post-op Diagnosis: same    Variation from Planned Procedure: None       Findings: angiography demonstrates arterial supply from both the right and left hepatic arteries. Unable to cannulate right phrenic artery, but suspect a portion of the mass is supplied by this as well. mAA injected from main hepatic artery, which is replaced to the SMA.     Patient condition: Stable    Estimated Blood Loss: 5 cc    Specimens:  none      Signed,  Geoffrey Vigil MD  9:13 AM  4/26/2023

## 2023-04-26 NOTE — DISCHARGE INSTRUCTIONS
[facility] [employer]  Cardiovascular Special Procedures  Yttrium 90 Treatment/Liver Angiogram  Patient Discharge Instructions    Diet- May resume your regular diet after discharge. You should drink at least six 8oz. glasses of water over the next 24 hours. Water helps to clear the dye used during the procedure. Day 1 (Procedure Day):  Rest for the remainder of the day. Minimal stair climbing, if you must use stairs, lead with your unaffected leg. Do not drive a car. Do not be alone. Avoid prolonged sitting, walk around occasionally until bedtime tonight. Leave dressing intact. Day 2:  You may climb stairs, begin slowly  You may drive a car, unless otherwise directed by physician. Remove dressing. You may bathe/shower, but wash gently around the puncture site and pat dry. Place new band-aid on site daily until skin heals. Things to Avoid:  You may not do any strenuous exercises for one week. (ex: golfing, bowling, tennis, vacuum, snow removal, jogging, and aerobic exercises). No hot tubs, baths, or pools for one week. Do not lift anything over 10 pounds for 10 days (a gallon of milk is considered 8 pounds). Avoid positions that would put pressure on your groin. Like sitting upright in a straight back chair. No lotions, powders, or ointments near site for 5 days. Things to watch for:  You may have a small lump or a bruise. This is common and will go away. If the lump increases and site becomes painful, call physician immediately. If mild discomfort occurs at the puncture site you may place an ice pack on the site at 15 minute intervals. If the puncture site starts to bleed, immediately lie on a hard flat surface and apply pressure just above the puncture site. Have someone call 911 and maintain pressure until the EMS arrives. If you have loss of color, extreme coldness or numbness of the leg, call 911 immediately.   Excessive pain of the groin, thigh or calf, call your physician

## 2023-04-28 LAB — INR BLD: 1.4 (ref 0.84–1.16)

## 2023-05-24 ENCOUNTER — HOSPITAL ENCOUNTER (OUTPATIENT)
Dept: INTERVENTIONAL RADIOLOGY/VASCULAR | Age: 73
Discharge: HOME | End: 2023-05-24
Payer: MEDICARE

## 2023-05-24 VITALS
SYSTOLIC BLOOD PRESSURE: 122 MMHG | TEMPERATURE: 98.1 F | RESPIRATION RATE: 16 BRPM | BODY MASS INDEX: 26.03 KG/M2 | HEIGHT: 66 IN | WEIGHT: 162 LBS | OXYGEN SATURATION: 96 % | DIASTOLIC BLOOD PRESSURE: 71 MMHG | HEART RATE: 68 BPM

## 2023-05-24 DIAGNOSIS — K76.9 LIVER LESION: ICD-10-CM

## 2023-05-24 LAB
ALBUMIN SERPL-MCNC: 4.6 G/DL (ref 3.4–5)
ALBUMIN/GLOB SERPL: 1.5 {RATIO} (ref 1.1–2.2)
ALP SERPL-CCNC: 130 U/L (ref 40–129)
ALT SERPL-CCNC: 25 U/L (ref 10–40)
ANION GAP SERPL CALCULATED.3IONS-SCNC: 10 MMOL/L (ref 3–16)
AST SERPL-CCNC: 24 U/L (ref 15–37)
BASOPHILS # BLD: 0.1 K/UL (ref 0–0.2)
BASOPHILS NFR BLD: 1.4 %
BILIRUB SERPL-MCNC: 0.3 MG/DL (ref 0–1)
BUN SERPL-MCNC: 17 MG/DL (ref 7–20)
CALCIUM SERPL-MCNC: 9.2 MG/DL (ref 8.3–10.6)
CHLORIDE SERPL-SCNC: 102 MMOL/L (ref 99–110)
CO2 SERPL-SCNC: 29 MMOL/L (ref 21–32)
CREAT SERPL-MCNC: 0.7 MG/DL (ref 0.6–1.2)
DEPRECATED RDW RBC AUTO: 14.4 % (ref 12.4–15.4)
EOSINOPHIL # BLD: 0.2 K/UL (ref 0–0.6)
EOSINOPHIL NFR BLD: 3.4 %
GFR SERPLBLD CREATININE-BSD FMLA CKD-EPI: >60 ML/MIN/{1.73_M2}
GLUCOSE SERPL-MCNC: 127 MG/DL (ref 70–99)
HCT VFR BLD AUTO: 38.6 % (ref 36–48)
HGB BLD-MCNC: 13.1 G/DL (ref 12–16)
INR PPP: 0.94 (ref 0.84–1.16)
LYMPHOCYTES # BLD: 2 K/UL (ref 1–5.1)
LYMPHOCYTES NFR BLD: 29.9 %
MCH RBC QN AUTO: 31.1 PG (ref 26–34)
MCHC RBC AUTO-ENTMCNC: 34 G/DL (ref 31–36)
MCV RBC AUTO: 91.6 FL (ref 80–100)
MONOCYTES # BLD: 0.5 K/UL (ref 0–1.3)
MONOCYTES NFR BLD: 8.1 %
NEUTROPHILS # BLD: 3.8 K/UL (ref 1.7–7.7)
NEUTROPHILS NFR BLD: 57.2 %
PLATELET # BLD AUTO: 234 K/UL (ref 135–450)
PMV BLD AUTO: 8.9 FL (ref 5–10.5)
POTASSIUM SERPL-SCNC: 4.1 MMOL/L (ref 3.5–5.1)
PROT SERPL-MCNC: 7.6 G/DL (ref 6.4–8.2)
PROTHROMBIN TIME: 12.6 SEC (ref 11.5–14.8)
RBC # BLD AUTO: 4.21 M/UL (ref 4–5.2)
SODIUM SERPL-SCNC: 141 MMOL/L (ref 136–145)
WBC # BLD AUTO: 6.7 K/UL (ref 4–11)

## 2023-05-24 PROCEDURE — 80053 COMPREHEN METABOLIC PANEL: CPT

## 2023-05-24 PROCEDURE — 6360000002 HC RX W HCPCS

## 2023-05-24 PROCEDURE — 85025 COMPLETE CBC W/AUTO DIFF WBC: CPT

## 2023-05-24 PROCEDURE — C1894 INTRO/SHEATH, NON-LASER: HCPCS

## 2023-05-24 PROCEDURE — 99153 MOD SED SAME PHYS/QHP EA: CPT

## 2023-05-24 PROCEDURE — 36415 COLL VENOUS BLD VENIPUNCTURE: CPT

## 2023-05-24 PROCEDURE — 6360000004 HC RX CONTRAST MEDICATION: Performed by: STUDENT IN AN ORGANIZED HEALTH CARE EDUCATION/TRAINING PROGRAM

## 2023-05-24 PROCEDURE — 2709999900 HC NON-CHARGEABLE SUPPLY

## 2023-05-24 PROCEDURE — 85610 PROTHROMBIN TIME: CPT

## 2023-05-24 PROCEDURE — 75774 ARTERY X-RAY EACH VESSEL: CPT

## 2023-05-24 PROCEDURE — 99152 MOD SED SAME PHYS/QHP 5/>YRS: CPT

## 2023-05-24 PROCEDURE — C1769 GUIDE WIRE: HCPCS

## 2023-05-24 PROCEDURE — 76377 3D RENDER W/INTRP POSTPROCES: CPT

## 2023-05-24 PROCEDURE — C2616 BRACHYTX, NON-STR,YTTRIUM-90: HCPCS | Performed by: RADIOLOGY

## 2023-05-24 PROCEDURE — 2500000003 HC RX 250 WO HCPCS

## 2023-05-24 PROCEDURE — 37243 VASC EMBOLIZE/OCCLUDE ORGAN: CPT

## 2023-05-24 PROCEDURE — 3430000000 HC RX DIAGNOSTIC RADIOPHARMACEUTICAL: Performed by: RADIOLOGY

## 2023-05-24 PROCEDURE — 36247 INS CATH ABD/L-EXT ART 3RD: CPT

## 2023-05-24 PROCEDURE — 75726 ARTERY X-RAYS ABDOMEN: CPT

## 2023-05-24 PROCEDURE — C1760 CLOSURE DEV, VASC: HCPCS

## 2023-05-24 PROCEDURE — 79445 NUCLEAR RX INTRA-ARTERIAL: CPT

## 2023-05-24 PROCEDURE — C1887 CATHETER, GUIDING: HCPCS

## 2023-05-24 RX ADMIN — Medication 110 MILLICURIE: at 08:35

## 2023-05-24 RX ADMIN — IOHEXOL 35 ML: 350 INJECTION, SOLUTION INTRAVENOUS at 08:37

## 2023-05-24 NOTE — H&P
Patient:  Radha Machado   :   1950      Relevant clinical history, particularly as it involves the pending procedure, was reviewed and discussed. The procedure including risks and benefits was discussed at length with the patient (or designated family member) and all questions were answered. Informed consent to proceed with the procedure was given. Vital signs were monitored and documented by the Radiology nurse. Targeted physical examination  Heart : regular rate and rhythm  Lungs : clear, breathing easily  Condition : stable    [facility] nurses notes reviewed and agreed. Past Medical History:        Diagnosis Date    Allergic rhinitis     Basal cell cancer May 2014    Forehead: Dr. Archana Lindsey    BRCA positive     Calculus of gallbladder without cholecystitis without obstruction 2023    Depressive disorder     Hearing loss     HTN (hypertension)     Hx of bilateral mastectomy 2015    Hypercholesterolemia     Malignant neoplasm of female breast [facility])     s/p B Mastectomy - Dr. Lindsay Vieyra     Mild aortic sclerosis 2019    ECHO 19    Neuroendocrine tumor of liver 2023    Non-thrombocytopenic purpura ([address]     Osteopenia     DXA 10/12/11 Improving densitiy L hip    Skin lesion     4mm lesion L nose- dr. Archana Lindsey - (benign)    Stress incontinence 10/5/2012    Trace aortic valve regurgitation 2019    ECHO 19    Vertigo        Past Surgical History:           Procedure Laterality Date    APPENDECTOMY  1975    COLONOSCOPY  2006    CT NEEDLE BIOPSY LIVER PERCUTANEOUS  2023    CT NEEDLE BIOPSY LIVER PERCUTANEOUS 2023 [facility] CT SCAN    HYSTERECTOMY (CERVIX STATUS UNKNOWN)  1993    KALIN/BSO    MASTECTOMY Bilateral 2004    Bilateral    OVARY REMOVAL         Allergies:  Patient has no known allergies.     Medications:   Home Meds  Current Outpatient Medications on File Prior to Encounter   Medication Sig Dispense Refill    simvastatin

## 2023-05-24 NOTE — PROCEDURES
Interventional Radiology Post Procedure    Date: 5/24/2023    Physician: Jatin Aldridge MD    Pre-op Diagnosis: hepatic metastasis of neuroendocrine tumor    Post-op Diagnosis: same    Variation from Planned Procedure: None       Findings: successful y90 administration to the right hepatic lobe    Patient condition: Stable    Estimated Blood Loss: 5 cc    Specimens:  none      Signed,  Chava Tinsley MD  8:57 AM  5/24/2023

## 2023-05-24 NOTE — DISCHARGE INSTRUCTIONS
[facility] [employer]  Cardiovascular Special Procedures  Yttrium 90 Treatment/Liver Angiogram  Patient Discharge Instructions    Diet- May resume your regular diet after discharge. You should drink at least six 8oz. glasses of water over the next 24 hours. Water helps to clear the dye used during the procedure. Day 1 (Procedure Day):  Rest for the remainder of the day. Minimal stair climbing, if you must use stairs, lead with your unaffected leg. Do not drive a car. Do not be alone. Avoid prolonged sitting, walk around occasionally until bedtime tonight. Leave dressing intact. Day 2:  You may climb stairs, begin slowly  You may drive a car, unless otherwise directed by physician. Remove dressing. You may bathe/shower, but wash gently around the puncture site and pat dry. Place new band-aid on site daily until skin heals. Things to Avoid:  You may not do any strenuous exercises for one week. (ex: golfing, bowling, tennis, vacuum, snow removal, jogging, and aerobic exercises). No hot tubs, baths, or pools for one week. Do not lift anything over 10 pounds for 10 days. Avoid positions that would put pressure on your groin. Like sitting upright in a straight back chair. No lotions, powders, or ointments near site for 5 days. Things to watch for:  You may have a small lump or a bruise. This is common and will go away. If the lump increases and site becomes painful, call physician immediately. If mild discomfort occurs at the puncture site you may place an ice pack on the site at 15 minute intervals. If the puncture site starts to bleed, immediately lie on a hard flat surface and apply pressure just above the puncture site. Have someone call 911 and maintain pressure until the EMS arrives. If you have loss of color, extreme coldness or numbness of the leg, call 911 immediately. Excessive pain of the groin, thigh or calf, call your physician immediately.    Watch for signs and symptoms of

## 2023-05-31 ENCOUNTER — TELEPHONE (OUTPATIENT)
Dept: CARDIAC CATH/INVASIVE PROCEDURES | Age: 73
End: 2023-05-31

## 2023-05-31 NOTE — TELEPHONE ENCOUNTER
Pt called to report that she is 1 week post Y90 treatment and is continuing to feel very low energy to the point that it is affecting her daily activities. She is also experiencing some lower right quadrant discomfort at times. Her groin site is clean/dry/intact, no issues to report. Dr. Nithin Templeton, who did her treatment on 5/24, was notified and asked to have her come in tomorrow to be evaluated if she continues to feel the extreme tiredness/low energy. She is to be seen in the holding bay around 2p tomorrow, she will call to cancel if she is feeling better in the AM. MD aware.

## 2023-06-09 DIAGNOSIS — I10 ESSENTIAL HYPERTENSION: ICD-10-CM

## 2023-06-09 RX ORDER — LISINOPRIL 20 MG/1
TABLET ORAL
Qty: 90 TABLET | Refills: 1 | Status: SHIPPED | OUTPATIENT
Start: 2023-06-09

## 2023-06-09 NOTE — TELEPHONE ENCOUNTER
Medication:   Requested Prescriptions     Pending Prescriptions Disp Refills    lisinopril (PRINIVIL;ZESTRIL) 20 MG tablet [Pharmacy Med Name: LISINOPRIL 20 MG TABLET] 90 tablet 1     Sig: TAKE ONE TABLET BY MOUTH DAILY        Last Filled:  12/12/22    Patient Phone Number: [phone] (home) [phone] (work)    Last appt: 4/11/2023   Next appt: 7/11/2023    Last OARRS: No flowsheet data found.

## 2023-07-03 ENCOUNTER — OFFICE VISIT (OUTPATIENT)
Dept: PRIMARY CARE CLINIC | Age: 73
End: 2023-07-03

## 2023-07-03 ENCOUNTER — TELEPHONE (OUTPATIENT)
Dept: SURGERY | Age: 73
End: 2023-07-03

## 2023-07-03 VITALS
WEIGHT: 158 LBS | SYSTOLIC BLOOD PRESSURE: 128 MMHG | TEMPERATURE: 97 F | HEIGHT: 66 IN | DIASTOLIC BLOOD PRESSURE: 70 MMHG | BODY MASS INDEX: 25.39 KG/M2 | HEART RATE: 76 BPM | OXYGEN SATURATION: 98 %

## 2023-07-03 DIAGNOSIS — R11.0 NAUSEA: Primary | ICD-10-CM

## 2023-07-03 DIAGNOSIS — C7A.00 MALIGNANT CARCINOID TUMOR, UNSPECIFIED SITE (HCC): ICD-10-CM

## 2023-07-03 DIAGNOSIS — K80.20 CALCULUS OF GALLBLADDER WITHOUT CHOLECYSTITIS WITHOUT OBSTRUCTION: ICD-10-CM

## 2023-07-03 DIAGNOSIS — K59.00 CONSTIPATION, UNSPECIFIED CONSTIPATION TYPE: ICD-10-CM

## 2023-07-03 RX ORDER — OXYCODONE HYDROCHLORIDE 5 MG/1
5 TABLET ORAL
COMMUNITY
Start: 2023-02-07

## 2023-07-03 RX ORDER — DOCUSATE SODIUM 100 MG/1
100 CAPSULE, LIQUID FILLED ORAL DAILY PRN
Qty: 30 CAPSULE | Refills: 2 | Status: SHIPPED | OUTPATIENT
Start: 2023-07-03

## 2023-07-03 RX ORDER — ONDANSETRON 4 MG/1
TABLET, ORALLY DISINTEGRATING ORAL
COMMUNITY
Start: 2023-06-26

## 2023-07-03 ASSESSMENT — ENCOUNTER SYMPTOMS
ABDOMINAL PAIN: 1
BLOOD IN STOOL: 0
VOMITING: 0
DIARRHEA: 0
NAUSEA: 1
CONSTIPATION: 1
ABDOMINAL DISTENTION: 0

## 2023-07-03 ASSESSMENT — PATIENT HEALTH QUESTIONNAIRE - PHQ9
3. TROUBLE FALLING OR STAYING ASLEEP: 1
9. THOUGHTS THAT YOU WOULD BE BETTER OFF DEAD, OR OF HURTING YOURSELF: 0
4. FEELING TIRED OR HAVING LITTLE ENERGY: 3
SUM OF ALL RESPONSES TO PHQ QUESTIONS 1-9: 12
SUM OF ALL RESPONSES TO PHQ QUESTIONS 1-9: 12
2. FEELING DOWN, DEPRESSED OR HOPELESS: 2
10. IF YOU CHECKED OFF ANY PROBLEMS, HOW DIFFICULT HAVE THESE PROBLEMS MADE IT FOR YOU TO DO YOUR WORK, TAKE CARE OF THINGS AT HOME, OR GET ALONG WITH OTHER PEOPLE: 1
7. TROUBLE CONCENTRATING ON THINGS, SUCH AS READING THE NEWSPAPER OR WATCHING TELEVISION: 0
1. LITTLE INTEREST OR PLEASURE IN DOING THINGS: 2
8. MOVING OR SPEAKING SO SLOWLY THAT OTHER PEOPLE COULD HAVE NOTICED. OR THE OPPOSITE, BEING SO FIGETY OR RESTLESS THAT YOU HAVE BEEN MOVING AROUND A LOT MORE THAN USUAL: 0
SUM OF ALL RESPONSES TO PHQ9 QUESTIONS 1 & 2: 4
SUM OF ALL RESPONSES TO PHQ QUESTIONS 1-9: 12
5. POOR APPETITE OR OVEREATING: 3
6. FEELING BAD ABOUT YOURSELF - OR THAT YOU ARE A FAILURE OR HAVE LET YOURSELF OR YOUR FAMILY DOWN: 1
SUM OF ALL RESPONSES TO PHQ QUESTIONS 1-9: 12

## 2023-07-03 NOTE — PROGRESS NOTES
[facility]  7/3/2023    Mihir Sam (:  1950) is a 71 y.o. female, here for evaluation of the following medical concerns:    Chief Complaint   Patient presents with    Check-Up     Carcinoid tumor found in Feb. Had a Y-90 procedure done 23. Ever since then she has had no energy, everything has been making her sick. Gets an injection once a month with oncology, next one on Monday, to suppress the growth of the tumor, but she keeps on feeling worse and worse. It has been making her feel very depressed and she does not feel like doing anything. Cerumen Impaction     Right ear has wax buildup. She had her hearing aid taken out and cleaned and they noticed a buildup of wax in her ear. ASSESSMENT/ PLAN  1. Nausea  Uncontrolled, this is a new problem. Differential includes gallbladder disease, chemotherapy side effect, constipation. Recommended that the patient follow-up with her surgeon to discuss cholecystectomy and/or HIDA scan. Continue Zofran as needed, and address constipation with stool softener per below. 2. Malignant carcinoid tumor, unspecified site [facility])  Continue follow-up with oncology    3. Constipation, unspecified constipation type  Uncontrolled, was unable to tolerate MiraLAX. Recommended suppository, patient would like to try Colace first.  - docusate sodium (COLACE) 100 MG capsule; Take 1 capsule by mouth daily as needed for Constipation  Dispense: 30 capsule; Refill: 2    4. Calculus of gallbladder without cholecystitis without obstruction  Per above      Return if symptoms worsen or fail to improve. HPI  Patient presents today with concerns of nausea, abdominal bloating. She states that cholelithiasis was diagnosed at the same time as her carcinoid liver tumor. She has been receiving chemotherapy since May.   Notes that despite more time between treatments, she has developed more bloating, nausea and right upper quadrant pain after

## 2023-07-03 NOTE — TELEPHONE ENCOUNTER
Patient called in stating that she has very bad stomach pain every time she eats.      Patient states that she has been having this stomach pain since last Thursday 6/29/2023    Please contact the patient at [phone]

## 2023-07-11 ENCOUNTER — OFFICE VISIT (OUTPATIENT)
Dept: SURGERY | Age: 73
End: 2023-07-11
Payer: MEDICARE

## 2023-07-11 VITALS
HEIGHT: 66 IN | SYSTOLIC BLOOD PRESSURE: 114 MMHG | HEART RATE: 94 BPM | TEMPERATURE: 97.3 F | DIASTOLIC BLOOD PRESSURE: 68 MMHG | WEIGHT: 160 LBS | BODY MASS INDEX: 25.71 KG/M2

## 2023-07-11 DIAGNOSIS — K80.20 CALCULUS OF GALLBLADDER WITHOUT CHOLECYSTITIS WITHOUT OBSTRUCTION: ICD-10-CM

## 2023-07-11 DIAGNOSIS — D3A.8 NEUROENDOCRINE TUMOR OF LIVER: Primary | ICD-10-CM

## 2023-07-11 DIAGNOSIS — R10.9 ABDOMINAL PAIN, UNSPECIFIED ABDOMINAL LOCATION: ICD-10-CM

## 2023-07-11 PROCEDURE — 1090F PRES/ABSN URINE INCON ASSESS: CPT | Performed by: SURGERY

## 2023-07-11 PROCEDURE — G8427 DOCREV CUR MEDS BY ELIG CLIN: HCPCS | Performed by: SURGERY

## 2023-07-11 PROCEDURE — G8417 CALC BMI ABV UP PARAM F/U: HCPCS | Performed by: SURGERY

## 2023-07-11 PROCEDURE — G8399 PT W/DXA RESULTS DOCUMENT: HCPCS | Performed by: SURGERY

## 2023-07-11 PROCEDURE — 1123F ACP DISCUSS/DSCN MKR DOCD: CPT | Performed by: SURGERY

## 2023-07-11 PROCEDURE — 3078F DIAST BP <80 MM HG: CPT | Performed by: SURGERY

## 2023-07-11 PROCEDURE — 3074F SYST BP LT 130 MM HG: CPT | Performed by: SURGERY

## 2023-07-11 PROCEDURE — 3017F COLORECTAL CA SCREEN DOC REV: CPT | Performed by: SURGERY

## 2023-07-11 PROCEDURE — 99213 OFFICE O/P EST LOW 20 MIN: CPT | Performed by: SURGERY

## 2023-07-11 PROCEDURE — 1036F TOBACCO NON-USER: CPT | Performed by: SURGERY

## 2023-07-11 PROCEDURE — G9708 BILAT MAST/HX BI /UNILAT MAS: HCPCS | Performed by: SURGERY

## 2023-07-12 ENCOUNTER — TELEPHONE (OUTPATIENT)
Dept: SURGERY | Age: 73
End: 2023-07-12

## 2023-07-12 NOTE — TELEPHONE ENCOUNTER
MA called and scheduled patients CT Scan Chest, Abd, pelvis for this Friday July 14,2023   Arrival is 3:15 PM  CT Scan 4:30 PM  Patient was told nothing to eat or drink 4 hours prior and to bring insurance cards and photo ID   Patient verbalized these instructions as MA read them to her.

## 2023-07-14 ENCOUNTER — HOSPITAL ENCOUNTER (OUTPATIENT)
Dept: CT IMAGING | Age: 73
Discharge: HOME | End: 2023-07-14
Attending: SURGERY
Payer: MEDICARE

## 2023-07-14 DIAGNOSIS — K80.20 CALCULUS OF GALLBLADDER WITHOUT CHOLECYSTITIS WITHOUT OBSTRUCTION: ICD-10-CM

## 2023-07-14 DIAGNOSIS — R10.9 ABDOMINAL PAIN, UNSPECIFIED ABDOMINAL LOCATION: ICD-10-CM

## 2023-07-14 DIAGNOSIS — D3A.8 NEUROENDOCRINE TUMOR OF LIVER: ICD-10-CM

## 2023-07-14 LAB
PERFORMED ON: NORMAL
POC CREATININE: 0.8 MG/DL (ref 0.6–1.2)
POC SAMPLE TYPE: NORMAL

## 2023-07-14 PROCEDURE — 71260 CT THORAX DX C+: CPT

## 2023-07-14 PROCEDURE — 82565 ASSAY OF CREATININE: CPT

## 2023-07-14 PROCEDURE — 2500000003 HC RX 250 WO HCPCS: Performed by: SURGERY

## 2023-07-14 PROCEDURE — 6360000004 HC RX CONTRAST MEDICATION: Performed by: SURGERY

## 2023-07-14 RX ADMIN — IOPAMIDOL 75 ML: 755 INJECTION, SOLUTION INTRAVENOUS at 16:46

## 2023-07-14 RX ADMIN — BARIUM SULFATE 900 ML: 20 SUSPENSION ORAL at 16:46

## 2023-07-24 ENCOUNTER — TELEPHONE (OUTPATIENT)
Dept: SURGERY | Age: 73
End: 2023-07-24

## 2023-07-24 DIAGNOSIS — I10 ESSENTIAL HYPERTENSION: ICD-10-CM

## 2023-07-24 RX ORDER — AMLODIPINE BESYLATE 5 MG/1
TABLET ORAL
Qty: 90 TABLET | Refills: 3 | Status: SHIPPED | OUTPATIENT
Start: 2023-07-24

## 2023-07-24 NOTE — TELEPHONE ENCOUNTER
Medication:   Requested Prescriptions     Pending Prescriptions Disp Refills    amLODIPine (NORVASC) 5 MG tablet 90 tablet 3     Sig: TAKE ONE TABLET BY MOUTH DAILY    metoprolol tartrate (LOPRESSOR) 25 MG tablet 180 tablet 3     Sig: TAKE ONE TABLET BY MOUTH TWICE A DAY        Last Filled:  7/25/22    Patient Phone Number: [phone] (home) [phone] (work)    Last appt: 7/3/2023   Next appt: 11/21/2023    Last OARRS: No flowsheet data found.

## 2023-07-24 NOTE — PROGRESS NOTES
[facility] Surgical Oncology and General Surgery  [facility] [address] [facility], [address] [place] [address] Avenue  Phone: [phone]  Fax: [phone]    Visit Date: 7/27/2023    Subjective:   Branden Judge is a 71 y.o. female who is currently being managed for neuroendocrine tumor of the liver (s/p Y-90 5/24/23) who is here today to discuss surgical intervention for her symptomatic cholelithiasis. Pt has been complaining of epigastric pain and right upper quadrant pain for several months that has worsened since undergoing liver-directed therapy in May. Reports that her pain occurs intermittently throughout the day on a daily basis. She does not always experience post-prandial pain. Has occasionally used ibuprofen with slight improvement in pain.         Past Medical History:   Diagnosis Date    Allergic rhinitis     Basal cell cancer May 2014    Forehead: Dr. Candido Segura    BRCA positive     Calculus of gallbladder without cholecystitis without obstruction 2/28/2023    Depressive disorder     Hearing loss     HTN (hypertension)     Hx of bilateral mastectomy 7/27/2015    Hypercholesterolemia     Malignant neoplasm of female breast [facility])     s/p B Mastectomy - Dr. Julia Shoemaker 2004    Mild aortic sclerosis 01/17/2019    ECHO 01/17/19    Neuroendocrine tumor of liver 2/28/2023    Non-thrombocytopenic purpura ([address])     Osteopenia     DXA 10/12/11 Improving densitiy L hip    Skin lesion     4mm lesion L nose- dr. Candido Segura - (benign)    Stress incontinence 10/5/2012    Trace aortic valve regurgitation 01/17/2019    ECHO 01/17/19    Vertigo      Past Surgical History:   Procedure Laterality Date    APPENDECTOMY  01/01/1975    COLONOSCOPY  08/11/2006    CT NEEDLE BIOPSY LIVER PERCUTANEOUS  2/16/2023    CT NEEDLE BIOPSY LIVER PERCUTANEOUS 2/16/2023 [facility] CT SCAN    HYSTERECTOMY (CERVIX STATUS UNKNOWN)  01/01/1993    KALIN/BSO    MASTECTOMY Bilateral 01/01/2004    Bilateral    OVARY REMOVAL         Social History     Tobacco Use    Smoking

## 2023-07-24 NOTE — TELEPHONE ENCOUNTER
Placed call to Jaz Sanon, reviewed current symptoms. Continue to experience daily abdominal pain. Imaging results reviewed. Would like to come in for appointment to discuss gallbladder surgery.    Scheduled for tomorrow at 11am.

## 2023-07-25 ENCOUNTER — OFFICE VISIT (OUTPATIENT)
Dept: SURGERY | Age: 73
End: 2023-07-25
Payer: MEDICARE

## 2023-07-25 VITALS
WEIGHT: 160.4 LBS | HEIGHT: 66 IN | TEMPERATURE: 97.4 F | SYSTOLIC BLOOD PRESSURE: 134 MMHG | DIASTOLIC BLOOD PRESSURE: 71 MMHG | HEART RATE: 82 BPM | BODY MASS INDEX: 25.78 KG/M2

## 2023-07-25 DIAGNOSIS — D3A.8 NEUROENDOCRINE TUMOR OF LIVER: Primary | ICD-10-CM

## 2023-07-25 DIAGNOSIS — R22.2 MASS ON BACK: ICD-10-CM

## 2023-07-25 DIAGNOSIS — K80.10 CHRONIC CALCULOUS CHOLECYSTITIS: ICD-10-CM

## 2023-07-25 PROCEDURE — 1036F TOBACCO NON-USER: CPT | Performed by: SURGERY

## 2023-07-25 PROCEDURE — 1123F ACP DISCUSS/DSCN MKR DOCD: CPT | Performed by: SURGERY

## 2023-07-25 PROCEDURE — 3017F COLORECTAL CA SCREEN DOC REV: CPT | Performed by: SURGERY

## 2023-07-25 PROCEDURE — G8417 CALC BMI ABV UP PARAM F/U: HCPCS | Performed by: SURGERY

## 2023-07-25 PROCEDURE — 3074F SYST BP LT 130 MM HG: CPT | Performed by: SURGERY

## 2023-07-25 PROCEDURE — G8399 PT W/DXA RESULTS DOCUMENT: HCPCS | Performed by: SURGERY

## 2023-07-25 PROCEDURE — G9708 BILAT MAST/HX BI /UNILAT MAS: HCPCS | Performed by: SURGERY

## 2023-07-25 PROCEDURE — 99214 OFFICE O/P EST MOD 30 MIN: CPT | Performed by: SURGERY

## 2023-07-25 PROCEDURE — G8427 DOCREV CUR MEDS BY ELIG CLIN: HCPCS | Performed by: SURGERY

## 2023-07-25 PROCEDURE — 3078F DIAST BP <80 MM HG: CPT | Performed by: SURGERY

## 2023-07-25 PROCEDURE — 1090F PRES/ABSN URINE INCON ASSESS: CPT | Performed by: SURGERY

## 2023-07-27 ENCOUNTER — TELEPHONE (OUTPATIENT)
Dept: SURGERY | Age: 73
End: 2023-07-27

## 2023-07-27 DIAGNOSIS — K59.00 CONSTIPATION, UNSPECIFIED CONSTIPATION TYPE: ICD-10-CM

## 2023-07-27 RX ORDER — DOCUSATE SODIUM 100 MG/1
100 CAPSULE, LIQUID FILLED ORAL DAILY PRN
Qty: 30 CAPSULE | Refills: 2 | Status: SHIPPED | OUTPATIENT
Start: 2023-07-27

## 2023-07-27 NOTE — TELEPHONE ENCOUNTER
Medication:   Requested Prescriptions     Pending Prescriptions Disp Refills    docusate sodium (COLACE) 100 MG capsule 30 capsule 2     Sig: Take 1 capsule by mouth daily as needed for Constipation        Last Filled:  7/3/23    Last appt: 7/3/2023   Next appt: 11/21/2023    Last OARRS: No flowsheet data found.

## 2023-07-27 NOTE — TELEPHONE ENCOUNTER
Call placed to Nikita Henderson to verify surgery date 8/9. She wishes to proceed with that date. Will schedule and call patient back with details.

## 2023-08-02 ENCOUNTER — TELEPHONE (OUTPATIENT)
Dept: SURGERY | Age: 73
End: 2023-08-02

## 2023-08-02 NOTE — TELEPHONE ENCOUNTER
Pre-op Call     Pre-op phone call completed 8/2/2023 3:43 PM     Arrival date/time at [facility]: [address] [facility] [place] [facility] [facility]) August 14th, 06:00     Surgical site and laterality confirmed: Yes    [x] Reminded to be NPO after midnight    Pre-op H&P  [x] To be completed with PCP  [] To be completed day of surgery       Lymphoscintigram scheduled if needed:  NA    Pre-op labs completed (if needed):   NA    Blood thinning medications held for surgery: Not on any anticoagulation     Bowel prep reviewed: Yes     Has ride home from hospital: Yes     Instructed to call with any questions or concerns. Verbalized understanding.

## 2023-08-03 ENCOUNTER — TELEPHONE (OUTPATIENT)
Dept: SURGERY | Age: 73
End: 2023-08-03

## 2023-08-03 NOTE — TELEPHONE ENCOUNTER
RN explained that in regards to her Back mass - we dont need to take them if she had injections at same spot on both sites. .will check again on surgery day. .   Bowel resection - only if we find primary of cancer and if its in bowel. .will explain her gain before surgery. Patient verbalized an understanding.

## 2023-08-09 ENCOUNTER — TELEPHONE (OUTPATIENT)
Dept: SURGERY | Age: 73
End: 2023-08-09

## 2023-08-09 NOTE — PROGRESS NOTES
Place patient label inside box (if no patient label, complete below)  Name:  :  MR#:   Cee Ra / PROCEDURE  I (we), Oxana Antonio (Patient Name) authorize Zack Ko MD (Provider / Gerard Ni) and/or such assistants as may be selected by him/her, to perform the following operation/procedure(s): ROBOTIC / Kaykay Cao, POSSIBLE INTRAOPERATIVE CHOLANGIOGRAM; POSSIBLE BOWEL RESECTION; POSSIBLE BACK MASS EXCISION       Note: If unable to obtain consent prior to an emergent procedure, document the emergent reason in the medical record. This procedure has been explained to my (our) satisfaction and included in the explanation was: The intended benefit, nature, and extent of the procedure to be performed; The significant risks involved and the probability of success; Alternative procedures and methods of treatment; The dangers and probable consequences of such alternatives (including no procedure or treatment); The expected consequences of the procedure on my future health; Whether other qualified individuals would be performing important surgical tasks and/or whether  would be present to advise or support the procedure. I (we) understand that there are other risks of infection and other serious complications in the pre-operative/procedural and postoperative/procedural stages of my (our) care. I (we) have asked all of the questions which I (we) thought were important in deciding whether or not to undergo treatment or diagnosis. These questions have been answered to my (our) satisfaction. I (we) understand that no assurance can be given that the procedure will be a success, and no guarantee or warranty of success has been given to me (us).     It has been explained to me (us) that during the course of the operation/procedure, unforeseen conditions may be revealed that necessitate extension of the original

## 2023-08-09 NOTE — PROGRESS NOTES
[facility] PRE-SURGICAL TESTING INSTRUCTIONS                      PRIOR TO PROCEDURE DATE:    1. PLEASE FOLLOW ANY INSTRUCTIONS GIVEN TO YOU PER YOUR SURGEON. 2. Arrange for someone to drive you home and be with you for the first 24 hours after discharge for your safety after your procedure for which you received sedation. Ensure it is someone we can share information with regarding your discharge. NOTE: At this time ONLY 2 ADULTS may accompany you   One person ENCOURAGED to stay at hospital entire time if outpatient surgery      3. You must contact your surgeon for instructions IF:  You are taking any blood thinners, aspirin, anti-inflammatory or vitamins. There is a change in your physical condition such as a cold, fever, rash, cuts, sores, or any other infection, especially near your surgical site. 4. Do not drink alcohol the day before or day of your procedure. Do not use any recreational marijuana at least 24 hours or street drugs (heroin, cocaine) at minimum 5 days prior to your procedure. 5. A Pre-Surgical History and Physical MUST be completed WITHIN 30 DAYS OR LESS prior to your procedure. by your Physician or an Urgent Care        THE DAY OF YOUR PROCEDURE:  1. Follow instructions for ARRIVAL TIME as DIRECTED BY YOUR SURGEON. 2. Enter the MAIN entrance from [employer] and follow the signs to the free Parking b[employer]tappro[employer] or [employer] (offered free of charge 7 am-5pm). 3. Enter the Main Entrance of the hospital (do not enter from the lower level of the parking garage). Upon entrance, check in with the  at the surgical information desk on your LEFT. Bring your insurance card and photo ID to register      4. DO NOT EAT ANYTHING 8 hours prior to arrival for surgery. You may have up to 8 ounces of water 4 hours prior to your arrival for surgery.    NOTE: ALL Gastric, Bariatric & Bowel surgery patients - you MUST follow your surgeon's instructions regarding please bring it with you on the day of your procedure. 10. If you use oxygen at home, please bring your oxygen tank with you to hospital..     11. We recommend that valuable personal belongings such as cash, cell phones, e-tablets, or jewelry, be left at home during your stay. The hospital will not be responsible for valuables that are not secured in the hospital safe. However, if your insurance requires a co-pay, you may want to bring a method of payment, i.e., Check or credit card, if you wish to pay your co-pay the day of surgery. 12. If you are to stay overnight, you may bring a bag with personal items. Please have any large items you may need brought in by your family after your arrival to your hospital room. 15. If you have a Living Will or Durable Power of , please bring a copy on the day of your procedure. How we keep you safe and work to prevent surgical site infections:   1. Health care workers should always check your ID bracelet to verify your name and birth date. You will be asked many times to state your name, date of birth, and allergies. 2. Health care workers should always clean their hands with soap or alcohol gel before providing care to you. It is okay to ask anyone if they cleaned their hands before they touch you. 3. You will be actively involved in verifying the type of procedure you are having and ensuring the correct surgical site. This will be confirmed multiple times prior to your procedure. Do NOT shasha your surgery site UNLESS instructed to by your surgeon. 4. When you are in the operating room, your surgical site will be cleansed with a special soap, and in most cases, you will be given an antibiotic before the surgery begins. What to expect AFTER your procedure? 1. Immediately following your procedure, your will be taken to the PACU for the first phase of your recovery.   Your nurse will help you recover from any potential side effects of

## 2023-08-09 NOTE — PROGRESS NOTES
8/9/23 @ 8090 Spoke with [facility] and they are faxing over latest office note and labs    /NR

## 2023-08-11 ENCOUNTER — TELEPHONE (OUTPATIENT)
Dept: SURGERY | Age: 73
End: 2023-08-11

## 2023-08-11 NOTE — TELEPHONE ENCOUNTER
Call placed to Jaden Soliz to ensure she understood arrival information and pre-op instructions for procedure on Monday morning. States she is feeling well, does not have any questions. Verbalized understanding of bowel prep (dulcolax 2tabs on Saturday and on Sunday) and that she should be NPO after midnight on Sunday. Will arrive at 5:30am Monday morning to [facility]. Instructed to call with any questions or concerns. Verbalized understanding.

## 2023-08-14 ENCOUNTER — APPOINTMENT (OUTPATIENT)
Dept: GENERAL RADIOLOGY | Age: 73
End: 2023-08-14
Attending: SURGERY
Payer: MEDICARE

## 2023-08-14 ENCOUNTER — ANESTHESIA EVENT (OUTPATIENT)
Dept: OPERATING ROOM | Age: 73
End: 2023-08-14
Payer: MEDICARE

## 2023-08-14 ENCOUNTER — ANESTHESIA (OUTPATIENT)
Dept: OPERATING ROOM | Age: 73
End: 2023-08-14
Payer: MEDICARE

## 2023-08-14 ENCOUNTER — HOSPITAL ENCOUNTER (OUTPATIENT)
Age: 73
Setting detail: SURGERY ADMIT
Discharge: HOME | End: 2023-08-14
Attending: SURGERY | Admitting: SURGERY
Payer: MEDICARE

## 2023-08-14 VITALS
TEMPERATURE: 97.3 F | HEART RATE: 77 BPM | SYSTOLIC BLOOD PRESSURE: 151 MMHG | OXYGEN SATURATION: 97 % | RESPIRATION RATE: 20 BRPM | WEIGHT: 158.4 LBS | BODY MASS INDEX: 25.46 KG/M2 | DIASTOLIC BLOOD PRESSURE: 76 MMHG | HEIGHT: 66 IN

## 2023-08-14 DIAGNOSIS — D3A.8 NEUROENDOCRINE TUMOR OF LIVER: ICD-10-CM

## 2023-08-14 DIAGNOSIS — G89.18 POST-OP PAIN: Primary | ICD-10-CM

## 2023-08-14 DIAGNOSIS — R22.2 MASS ON BACK: ICD-10-CM

## 2023-08-14 DIAGNOSIS — K80.10 CHRONIC CALCULOUS CHOLECYSTITIS: ICD-10-CM

## 2023-08-14 LAB
ABO + RH BLD: NORMAL
ALBUMIN SERPL-MCNC: 3.8 G/DL (ref 3.4–5)
ALBUMIN/GLOB SERPL: 0.9 {RATIO} (ref 1.1–2.2)
ALP SERPL-CCNC: 534 U/L (ref 40–129)
ALT SERPL-CCNC: 185 U/L (ref 10–40)
ANION GAP SERPL CALCULATED.3IONS-SCNC: 12 MMOL/L (ref 3–16)
APTT BLD: 27.9 SEC (ref 22.7–35.9)
AST SERPL-CCNC: 101 U/L (ref 15–37)
BILIRUB SERPL-MCNC: 0.4 MG/DL (ref 0–1)
BLD GP AB SCN SERPL QL: NORMAL
BUN SERPL-MCNC: 14 MG/DL (ref 7–20)
CALCIUM SERPL-MCNC: 9.7 MG/DL (ref 8.3–10.6)
CHLORIDE SERPL-SCNC: 101 MMOL/L (ref 99–110)
CO2 SERPL-SCNC: 28 MMOL/L (ref 21–32)
CREAT SERPL-MCNC: 0.7 MG/DL (ref 0.6–1.2)
DEPRECATED RDW RBC AUTO: 15.5 % (ref 12.4–15.4)
EKG ATRIAL RATE: 80 BPM
EKG DIAGNOSIS: NORMAL
EKG P AXIS: 43 DEGREES
EKG P-R INTERVAL: 158 MS
EKG Q-T INTERVAL: 408 MS
EKG QRS DURATION: 98 MS
EKG QTC CALCULATION (BAZETT): 470 MS
EKG R AXIS: 64 DEGREES
EKG T AXIS: 38 DEGREES
EKG VENTRICULAR RATE: 80 BPM
GFR SERPLBLD CREATININE-BSD FMLA CKD-EPI: >60 ML/MIN/{1.73_M2}
GLUCOSE SERPL-MCNC: 134 MG/DL (ref 70–99)
HCT VFR BLD AUTO: 35.9 % (ref 36–48)
HGB BLD-MCNC: 11.9 G/DL (ref 12–16)
INR PPP: 1.04 (ref 0.84–1.16)
MCH RBC QN AUTO: 29.5 PG (ref 26–34)
MCHC RBC AUTO-ENTMCNC: 33.2 G/DL (ref 31–36)
MCV RBC AUTO: 88.8 FL (ref 80–100)
PLATELET # BLD AUTO: 360 K/UL (ref 135–450)
PMV BLD AUTO: 8.6 FL (ref 5–10.5)
POTASSIUM SERPL-SCNC: 3.9 MMOL/L (ref 3.5–5.1)
PROT SERPL-MCNC: 7.9 G/DL (ref 6.4–8.2)
PROTHROMBIN TIME: 13.7 SEC (ref 11.5–14.8)
RBC # BLD AUTO: 4.05 M/UL (ref 4–5.2)
SODIUM SERPL-SCNC: 141 MMOL/L (ref 136–145)
WBC # BLD AUTO: 9.5 K/UL (ref 4–11)

## 2023-08-14 PROCEDURE — 3700000000 HC ANESTHESIA ATTENDED CARE: Performed by: SURGERY

## 2023-08-14 PROCEDURE — 3600000019 HC SURGERY ROBOT ADDTL 15MIN: Performed by: SURGERY

## 2023-08-14 PROCEDURE — 71045 X-RAY EXAM CHEST 1 VIEW: CPT

## 2023-08-14 PROCEDURE — 93005 ELECTROCARDIOGRAM TRACING: CPT | Performed by: SURGERY

## 2023-08-14 PROCEDURE — 88304 TISSUE EXAM BY PATHOLOGIST: CPT

## 2023-08-14 PROCEDURE — 2500000003 HC RX 250 WO HCPCS: Performed by: SURGERY

## 2023-08-14 PROCEDURE — 7100000001 HC PACU RECOVERY - ADDTL 15 MIN: Performed by: SURGERY

## 2023-08-14 PROCEDURE — 80053 COMPREHEN METABOLIC PANEL: CPT

## 2023-08-14 PROCEDURE — 6370000000 HC RX 637 (ALT 250 FOR IP): Performed by: ANESTHESIOLOGY

## 2023-08-14 PROCEDURE — C9399 UNCLASSIFIED DRUGS OR BIOLOG: HCPCS | Performed by: NURSE ANESTHETIST, CERTIFIED REGISTERED

## 2023-08-14 PROCEDURE — 7100000000 HC PACU RECOVERY - FIRST 15 MIN: Performed by: SURGERY

## 2023-08-14 PROCEDURE — 86900 BLOOD TYPING SEROLOGIC ABO: CPT

## 2023-08-14 PROCEDURE — 49905 OMENTAL FLAP INTRA-ABDOM: CPT | Performed by: SURGERY

## 2023-08-14 PROCEDURE — 85610 PROTHROMBIN TIME: CPT

## 2023-08-14 PROCEDURE — 49329 UNLSTD LAPS PX ABD PERTM&OMN: CPT | Performed by: SURGERY

## 2023-08-14 PROCEDURE — 7100000010 HC PHASE II RECOVERY - FIRST 15 MIN: Performed by: SURGERY

## 2023-08-14 PROCEDURE — 2709999900 HC NON-CHARGEABLE SUPPLY: Performed by: SURGERY

## 2023-08-14 PROCEDURE — 47379 UNLISTED LAPS PX LIVER: CPT | Performed by: SURGERY

## 2023-08-14 PROCEDURE — 6360000002 HC RX W HCPCS: Performed by: ANESTHESIOLOGY

## 2023-08-14 PROCEDURE — 2580000003 HC RX 258: Performed by: FAMILY MEDICINE

## 2023-08-14 PROCEDURE — 6360000002 HC RX W HCPCS: Performed by: SURGERY

## 2023-08-14 PROCEDURE — 2580000003 HC RX 258: Performed by: ANESTHESIOLOGY

## 2023-08-14 PROCEDURE — 2500000003 HC RX 250 WO HCPCS: Performed by: NURSE ANESTHETIST, CERTIFIED REGISTERED

## 2023-08-14 PROCEDURE — 86850 RBC ANTIBODY SCREEN: CPT

## 2023-08-14 PROCEDURE — 2580000003 HC RX 258: Performed by: SURGERY

## 2023-08-14 PROCEDURE — 85730 THROMBOPLASTIN TIME PARTIAL: CPT

## 2023-08-14 PROCEDURE — S2900 ROBOTIC SURGICAL SYSTEM: HCPCS | Performed by: SURGERY

## 2023-08-14 PROCEDURE — 6360000002 HC RX W HCPCS

## 2023-08-14 PROCEDURE — 86901 BLOOD TYPING SEROLOGIC RH(D): CPT

## 2023-08-14 PROCEDURE — 6360000002 HC RX W HCPCS: Performed by: NURSE ANESTHETIST, CERTIFIED REGISTERED

## 2023-08-14 PROCEDURE — 85027 COMPLETE CBC AUTOMATED: CPT

## 2023-08-14 PROCEDURE — 3600000009 HC SURGERY ROBOT BASE: Performed by: SURGERY

## 2023-08-14 PROCEDURE — 47562 LAPAROSCOPIC CHOLECYSTECTOMY: CPT | Performed by: SURGERY

## 2023-08-14 PROCEDURE — 93010 ELECTROCARDIOGRAM REPORT: CPT | Performed by: INTERNAL MEDICINE

## 2023-08-14 PROCEDURE — 7100000011 HC PHASE II RECOVERY - ADDTL 15 MIN: Performed by: SURGERY

## 2023-08-14 PROCEDURE — 88307 TISSUE EXAM BY PATHOLOGIST: CPT

## 2023-08-14 PROCEDURE — 88305 TISSUE EXAM BY PATHOLOGIST: CPT

## 2023-08-14 PROCEDURE — 3700000001 HC ADD 15 MINUTES (ANESTHESIA): Performed by: SURGERY

## 2023-08-14 RX ORDER — HYDRALAZINE HYDROCHLORIDE 20 MG/ML
10 INJECTION INTRAMUSCULAR; INTRAVENOUS
Status: DISCONTINUED | OUTPATIENT
Start: 2023-08-14 | End: 2023-08-14 | Stop reason: HOSPADM

## 2023-08-14 RX ORDER — PHENYLEPHRINE HYDROCHLORIDE 10 MG/ML
INJECTION INTRAVENOUS PRN
Status: DISCONTINUED | OUTPATIENT
Start: 2023-08-14 | End: 2023-08-14 | Stop reason: SDUPTHER

## 2023-08-14 RX ORDER — METRONIDAZOLE 500 MG/100ML
500 INJECTION, SOLUTION INTRAVENOUS ONCE
Status: COMPLETED | OUTPATIENT
Start: 2023-08-14 | End: 2023-08-14

## 2023-08-14 RX ORDER — BUPIVACAINE HYDROCHLORIDE AND EPINEPHRINE 5; 5 MG/ML; UG/ML
INJECTION, SOLUTION EPIDURAL; INTRACAUDAL; PERINEURAL PRN
Status: DISCONTINUED | OUTPATIENT
Start: 2023-08-14 | End: 2023-08-14 | Stop reason: HOSPADM

## 2023-08-14 RX ORDER — SODIUM CHLORIDE, SODIUM LACTATE, POTASSIUM CHLORIDE, CALCIUM CHLORIDE 600; 310; 30; 20 MG/100ML; MG/100ML; MG/100ML; MG/100ML
INJECTION, SOLUTION INTRAVENOUS CONTINUOUS
Status: DISCONTINUED | OUTPATIENT
Start: 2023-08-14 | End: 2023-08-14 | Stop reason: HOSPADM

## 2023-08-14 RX ORDER — AMOXICILLIN AND CLAVULANATE POTASSIUM 875; 125 MG/1; MG/1
1 TABLET, FILM COATED ORAL 2 TIMES DAILY
Qty: 14 TABLET | Refills: 0 | Status: SHIPPED | OUTPATIENT
Start: 2023-08-14 | End: 2023-08-21

## 2023-08-14 RX ORDER — FUROSEMIDE 10 MG/ML
INJECTION INTRAMUSCULAR; INTRAVENOUS
Status: COMPLETED
Start: 2023-08-14 | End: 2023-08-14

## 2023-08-14 RX ORDER — LIDOCAINE HYDROCHLORIDE 20 MG/ML
INJECTION, SOLUTION INTRAVENOUS PRN
Status: DISCONTINUED | OUTPATIENT
Start: 2023-08-14 | End: 2023-08-14 | Stop reason: SDUPTHER

## 2023-08-14 RX ORDER — SODIUM CHLORIDE 0.9 % (FLUSH) 0.9 %
5-40 SYRINGE (ML) INJECTION PRN
Status: DISCONTINUED | OUTPATIENT
Start: 2023-08-14 | End: 2023-08-14 | Stop reason: HOSPADM

## 2023-08-14 RX ORDER — MIDAZOLAM HYDROCHLORIDE 1 MG/ML
INJECTION INTRAMUSCULAR; INTRAVENOUS PRN
Status: DISCONTINUED | OUTPATIENT
Start: 2023-08-14 | End: 2023-08-14 | Stop reason: SDUPTHER

## 2023-08-14 RX ORDER — SODIUM CHLORIDE, SODIUM LACTATE, POTASSIUM CHLORIDE, AND CALCIUM CHLORIDE .6; .31; .03; .02 G/100ML; G/100ML; G/100ML; G/100ML
IRRIGANT IRRIGATION PRN
Status: DISCONTINUED | OUTPATIENT
Start: 2023-08-14 | End: 2023-08-14 | Stop reason: HOSPADM

## 2023-08-14 RX ORDER — FUROSEMIDE 10 MG/ML
10 INJECTION INTRAMUSCULAR; INTRAVENOUS ONCE
Status: COMPLETED | OUTPATIENT
Start: 2023-08-14 | End: 2023-08-14

## 2023-08-14 RX ORDER — SODIUM CHLORIDE 0.9 % (FLUSH) 0.9 %
5-40 SYRINGE (ML) INJECTION EVERY 12 HOURS SCHEDULED
Status: DISCONTINUED | OUTPATIENT
Start: 2023-08-14 | End: 2023-08-14 | Stop reason: HOSPADM

## 2023-08-14 RX ORDER — HYDROMORPHONE HYDROCHLORIDE 2 MG/ML
INJECTION, SOLUTION INTRAMUSCULAR; INTRAVENOUS; SUBCUTANEOUS PRN
Status: DISCONTINUED | OUTPATIENT
Start: 2023-08-14 | End: 2023-08-14 | Stop reason: SDUPTHER

## 2023-08-14 RX ORDER — OXYCODONE HYDROCHLORIDE 5 MG/1
5 TABLET ORAL
Status: COMPLETED | OUTPATIENT
Start: 2023-08-14 | End: 2023-08-14

## 2023-08-14 RX ORDER — ROCURONIUM BROMIDE 10 MG/ML
INJECTION, SOLUTION INTRAVENOUS PRN
Status: DISCONTINUED | OUTPATIENT
Start: 2023-08-14 | End: 2023-08-14 | Stop reason: SDUPTHER

## 2023-08-14 RX ORDER — DOCUSATE SODIUM 100 MG/1
100 CAPSULE, LIQUID FILLED ORAL 2 TIMES DAILY
Qty: 30 CAPSULE | Refills: 0 | Status: SHIPPED | OUTPATIENT
Start: 2023-08-14 | End: 2023-08-28

## 2023-08-14 RX ORDER — PROCHLORPERAZINE EDISYLATE 5 MG/ML
5 INJECTION INTRAMUSCULAR; INTRAVENOUS
Status: DISCONTINUED | OUTPATIENT
Start: 2023-08-14 | End: 2023-08-14 | Stop reason: HOSPADM

## 2023-08-14 RX ORDER — DEXAMETHASONE SODIUM PHOSPHATE 4 MG/ML
INJECTION, SOLUTION INTRA-ARTICULAR; INTRALESIONAL; INTRAMUSCULAR; INTRAVENOUS; SOFT TISSUE PRN
Status: DISCONTINUED | OUTPATIENT
Start: 2023-08-14 | End: 2023-08-14 | Stop reason: SDUPTHER

## 2023-08-14 RX ORDER — PROPOFOL 10 MG/ML
INJECTION, EMULSION INTRAVENOUS PRN
Status: DISCONTINUED | OUTPATIENT
Start: 2023-08-14 | End: 2023-08-14 | Stop reason: SDUPTHER

## 2023-08-14 RX ORDER — SODIUM CHLORIDE 9 MG/ML
INJECTION, SOLUTION INTRAVENOUS PRN
Status: DISCONTINUED | OUTPATIENT
Start: 2023-08-14 | End: 2023-08-14 | Stop reason: HOSPADM

## 2023-08-14 RX ORDER — LABETALOL HYDROCHLORIDE 5 MG/ML
10 INJECTION, SOLUTION INTRAVENOUS
Status: DISCONTINUED | OUTPATIENT
Start: 2023-08-14 | End: 2023-08-14 | Stop reason: HOSPADM

## 2023-08-14 RX ORDER — ONDANSETRON 2 MG/ML
4 INJECTION INTRAMUSCULAR; INTRAVENOUS
Status: COMPLETED | OUTPATIENT
Start: 2023-08-14 | End: 2023-08-14

## 2023-08-14 RX ORDER — ONDANSETRON 2 MG/ML
INJECTION INTRAMUSCULAR; INTRAVENOUS PRN
Status: DISCONTINUED | OUTPATIENT
Start: 2023-08-14 | End: 2023-08-14 | Stop reason: SDUPTHER

## 2023-08-14 RX ORDER — MEPERIDINE HYDROCHLORIDE 25 MG/ML
12.5 INJECTION INTRAMUSCULAR; INTRAVENOUS; SUBCUTANEOUS EVERY 5 MIN PRN
Status: DISCONTINUED | OUTPATIENT
Start: 2023-08-14 | End: 2023-08-14 | Stop reason: HOSPADM

## 2023-08-14 RX ORDER — OXYCODONE HYDROCHLORIDE 5 MG/1
5 TABLET ORAL EVERY 6 HOURS PRN
Qty: 28 TABLET | Refills: 0 | Status: SHIPPED | OUTPATIENT
Start: 2023-08-14 | End: 2023-08-21

## 2023-08-14 RX ADMIN — DEXAMETHASONE SODIUM PHOSPHATE 4 MG: 4 INJECTION, SOLUTION INTRAMUSCULAR; INTRAVENOUS at 08:06

## 2023-08-14 RX ADMIN — SUGAMMADEX 200 MG: 100 INJECTION, SOLUTION INTRAVENOUS at 10:23

## 2023-08-14 RX ADMIN — SODIUM CHLORIDE, POTASSIUM CHLORIDE, SODIUM LACTATE AND CALCIUM CHLORIDE: 600; 310; 30; 20 INJECTION, SOLUTION INTRAVENOUS at 07:02

## 2023-08-14 RX ADMIN — ONDANSETRON 4 MG: 2 INJECTION INTRAMUSCULAR; INTRAVENOUS at 08:06

## 2023-08-14 RX ADMIN — METRONIDAZOLE 500 MG: 500 INJECTION, SOLUTION INTRAVENOUS at 07:43

## 2023-08-14 RX ADMIN — HYDROMORPHONE HYDROCHLORIDE 0.4 MG: 2 INJECTION, SOLUTION INTRAMUSCULAR; INTRAVENOUS; SUBCUTANEOUS at 09:51

## 2023-08-14 RX ADMIN — LIDOCAINE HYDROCHLORIDE 50 MG: 20 INJECTION, SOLUTION INTRAVENOUS at 07:45

## 2023-08-14 RX ADMIN — SODIUM CHLORIDE, POTASSIUM CHLORIDE, SODIUM LACTATE AND CALCIUM CHLORIDE: 600; 310; 30; 20 INJECTION, SOLUTION INTRAVENOUS at 10:20

## 2023-08-14 RX ADMIN — HYDROMORPHONE HYDROCHLORIDE 0.4 MG: 2 INJECTION, SOLUTION INTRAMUSCULAR; INTRAVENOUS; SUBCUTANEOUS at 08:10

## 2023-08-14 RX ADMIN — OXYCODONE 5 MG: 5 TABLET ORAL at 14:17

## 2023-08-14 RX ADMIN — FUROSEMIDE 10 MG: 10 INJECTION INTRAMUSCULAR; INTRAVENOUS at 12:10

## 2023-08-14 RX ADMIN — ONDANSETRON 4 MG: 2 INJECTION INTRAMUSCULAR; INTRAVENOUS at 11:51

## 2023-08-14 RX ADMIN — HYDROMORPHONE HYDROCHLORIDE 0.4 MG: 2 INJECTION, SOLUTION INTRAMUSCULAR; INTRAVENOUS; SUBCUTANEOUS at 10:29

## 2023-08-14 RX ADMIN — FUROSEMIDE 10 MG: 10 INJECTION, SOLUTION INTRAMUSCULAR; INTRAVENOUS at 12:10

## 2023-08-14 RX ADMIN — ROCURONIUM BROMIDE 30 MG: 10 INJECTION INTRAVENOUS at 09:33

## 2023-08-14 RX ADMIN — HYDROMORPHONE HYDROCHLORIDE 0.4 MG: 2 INJECTION, SOLUTION INTRAMUSCULAR; INTRAVENOUS; SUBCUTANEOUS at 09:07

## 2023-08-14 RX ADMIN — MIDAZOLAM HYDROCHLORIDE 2 MG: 2 INJECTION, SOLUTION INTRAMUSCULAR; INTRAVENOUS at 07:38

## 2023-08-14 RX ADMIN — SODIUM CHLORIDE, POTASSIUM CHLORIDE, SODIUM LACTATE AND CALCIUM CHLORIDE: 600; 310; 30; 20 INJECTION, SOLUTION INTRAVENOUS at 06:46

## 2023-08-14 RX ADMIN — PROPOFOL 150 MG: 10 INJECTION, EMULSION INTRAVENOUS at 07:45

## 2023-08-14 RX ADMIN — ROCURONIUM BROMIDE 50 MG: 10 INJECTION INTRAVENOUS at 07:45

## 2023-08-14 RX ADMIN — PHENYLEPHRINE HYDROCHLORIDE 100 MCG: 10 INJECTION INTRAVENOUS at 07:57

## 2023-08-14 RX ADMIN — SODIUM CHLORIDE 1000 MG: 900 INJECTION INTRAVENOUS at 07:38

## 2023-08-14 RX ADMIN — HYDROMORPHONE HYDROCHLORIDE 0.4 MG: 2 INJECTION, SOLUTION INTRAMUSCULAR; INTRAVENOUS; SUBCUTANEOUS at 07:43

## 2023-08-14 RX ADMIN — ONDANSETRON 4 MG: 2 INJECTION INTRAMUSCULAR; INTRAVENOUS at 10:24

## 2023-08-14 ASSESSMENT — PAIN DESCRIPTION - DESCRIPTORS
DESCRIPTORS: TENDER
DESCRIPTORS: ACHING
DESCRIPTORS: TENDER
DESCRIPTORS: TENDER
DESCRIPTORS: DISCOMFORT

## 2023-08-14 ASSESSMENT — PAIN DESCRIPTION - FREQUENCY
FREQUENCY: CONTINUOUS

## 2023-08-14 ASSESSMENT — PAIN - FUNCTIONAL ASSESSMENT
PAIN_FUNCTIONAL_ASSESSMENT: ACTIVITIES ARE NOT PREVENTED
PAIN_FUNCTIONAL_ASSESSMENT: 0-10

## 2023-08-14 ASSESSMENT — PAIN SCALES - GENERAL
PAINLEVEL_OUTOF10: 3
PAINLEVEL_OUTOF10: 4
PAINLEVEL_OUTOF10: 4
PAINLEVEL_OUTOF10: 3
PAINLEVEL_OUTOF10: 3

## 2023-08-14 ASSESSMENT — PAIN DESCRIPTION - PAIN TYPE
TYPE: SURGICAL PAIN

## 2023-08-14 ASSESSMENT — PAIN DESCRIPTION - ONSET: ONSET: ON-GOING

## 2023-08-14 ASSESSMENT — PAIN DESCRIPTION - ORIENTATION
ORIENTATION: RIGHT

## 2023-08-14 ASSESSMENT — PAIN DESCRIPTION - LOCATION
LOCATION: ABDOMEN

## 2023-08-14 NOTE — ANESTHESIA PRE PROCEDURE
Department of Anesthesiology  Preprocedure Note       Name:  Mily Cook   Age:  71 y.o.  :  1950                                          MRN:  [id number]         Date:  2023      Surgeon: Nadege Phillips):  Ingrid Dela Cruz MD    Procedure: Procedure(s):  ROBOTIC / LAPAROSCOPIC CHOLECYSTECTOMY, POSSIBLE INTRAOPERATIVE CHOLANGIOGRAM; POSSIBLE BOWEL RESECTION  . Medications prior to admission:   Prior to Admission medications    Medication Sig Start Date End Date Taking? Authorizing Provider   docusate sodium (COLACE) 100 MG capsule Take 1 capsule by mouth daily as needed for Constipation 23   Bea Brambila MD   amLODIPine (NORVASC) 5 MG tablet TAKE ONE TABLET BY MOUTH DAILY 23   Ishaan Steinberg DO   metoprolol tartrate (LOPRESSOR) 25 MG tablet TAKE ONE TABLET BY MOUTH TWICE A DAY 23   Ishaan Steinberg DO   ondansetron (ZOFRAN-ODT) 4 MG disintegrating tablet Take by mouth 23   Historical Provider, MD   oxyCODONE (ROXICODONE) 5 MG immediate release tablet 1 tablet. 23   Historical Provider, MD   lisinopril (PRINIVIL;ZESTRIL) 20 MG tablet TAKE ONE TABLET BY MOUTH DAILY 23   Cynthia Pittman MD   simvastatin (ZOCOR) 20 MG tablet TAKE ONE TABLET BY MOUTH DAILY 3/20/23   Cynthia Pittman MD   venlafaxine (EFFEXOR XR) 75 MG extended release capsule TAKE ONE CAPSULE BY MOUTH DAILY 22   Cynthia Pittman MD   Cholecalciferol (VITAMIN D3) 2000 units CAPS Take 1 capsule by mouth daily    Historical Provider, MD   Probiotic Product (PROBIOTIC DAILY PO) Take by mouth    Historical Provider, MD   fluticasone (FLONASE) 50 MCG/ACT nasal spray INSTILL 1 SPRAY INTO THE NOSTRILS DAILY 16   Cynthia Pittman MD   Omega-3 Fatty Acids (FISH OIL PO) Take  by mouth.     Historical Provider, MD   Multiple Vitamins-Minerals (THERAPEUTIC MULTIVITAMIN-MINERALS) tablet Take 1 tablet by mouth daily    Historical Provider, MD   Ascorbic Acid (VITAMIN C) 500 MG tablet Take 1 tablet by mouth

## 2023-08-14 NOTE — FLOWSHEET NOTE
Pt  called and updated on pt status. Hearing aids received from  and given to pt who placed them in ears.

## 2023-08-14 NOTE — H&P
[facility]    [id number]    [facility] Same Day Surgery Update H & P  Department of General Surgery   Surgical Service   Pre-operative History and Physical      DIAGNOSIS:   Neuroendocrine tumor of liver [D3A.8]  Mass on back [R22.2]  Chronic calculous cholecystitis [K80.10]    PROCEDURE:  AL LAPS SURG CHOLECYSTECTOMY José Settler [[id number]] (ROBOTIC / LAPAROSCOPIC CHOLECYSTECTOMY, POSSIBLE INTRAOPERATIVE CHOLANGIOGRAM; POSSIBLE BOWEL RESECTION)  AL COLECTOMY PARTIAL W/ANASTOMOSIS [[id number]] (.)     HISTORY OF PRESENT ILLNESS:    Last seen in clinic 7/27. Here for scheduled procedure. Denies abdominal pain today. No new concerns or complaints. Covid 19:  Patient denies fever, chills, cough or known exposure to Covid-19.   Patient reports they have been quarantined at home since Covd-19 test.      Past Medical History:        Diagnosis Date    Allergic rhinitis     Basal cell cancer 05/2014    Forehead: Dr. Trinity Moore    BRCA positive     Calculus of gallbladder without cholecystitis without obstruction 02/28/2023    Depressive disorder     Full dentures     partial    Hearing loss     HTN (hypertension)     Hx of bilateral mastectomy 07/27/2015    Hypercholesterolemia     Malignant neoplasm of female breast [facility])     s/p B Mastectomy - Dr. Hyacinth Ontiveros 2004    Mild aortic sclerosis 01/17/2019    ECHO 01/17/19    Neuroendocrine tumor of liver 02/28/2023    Non-thrombocytopenic purpura ([address])     Osteopenia     DXA 10/12/11 Improving densitiy L hip    Skin lesion     4mm lesion L nose- dr. Trinity Moore - (benign)    Stress incontinence 10/05/2012    Trace aortic valve regurgitation 01/17/2019    ECHO 01/17/19    Vertigo     Wears glasses      Past Surgical History:        Procedure Laterality Date    APPENDECTOMY  01/01/1975    COLONOSCOPY  08/11/2006    CT NEEDLE BIOPSY LIVER PERCUTANEOUS  2/16/2023    CT NEEDLE BIOPSY LIVER PERCUTANEOUS 2/16/2023 [facility] CT SCAN    HYSTERECTOMY (CERVIX STATUS UNKNOWN)  01/01/1993

## 2023-08-14 NOTE — PROGRESS NOTES
Patient admitted to PACU # 10 from OR at 1041 post ROBOTIC / [facility] per Dr. Sumaya Elizondo. Attached to PACU monitoring system and report received from anesthesia provider. Patient was reported to be hemodynamically stable during procedure. Patient drowsy on admission and denied pain. Pt has 5 lap sites to abd that are all c/d/I with surgical glue. Ice pack applied. Pt on 3 L NC. IF infusing. Will continue to monitor.

## 2023-08-14 NOTE — PROGRESS NOTES
Unable to maintain sats on RA. Switched SP02 probe with no change in readings.  Pt remains on 1L NC; has yet to void via purewick

## 2023-08-14 NOTE — FLOWSHEET NOTE
Resident Dr Aki Johnson at bedside for eval. Pt weaned to RA. Tolerating for now. Waiting for xray results and for lasix to take effect.  Has yet to void       08/14/23 1235   Oxygen Therapy   SpO2 99 %   O2 Device None (Room air)   O2 Flow Rate (L/min) 0 L/min

## 2023-08-14 NOTE — FLOWSHEET NOTE
Unable to wean pt off O2. Pt drops to 80s when on RA. Placed pt on 2 L NC.  LLL noted to have crackers. Dr. Francisco J Arnold called and made aware who ordered 10 mg lasix. Order placed. Will give. Puriwck placed for comfort. Will perfectserve residents.

## 2023-08-14 NOTE — ANESTHESIA POSTPROCEDURE EVALUATION
Department of Anesthesiology  Postprocedure Note    Patient: Grace Mcghee  MRN: [id number]  YOB: 1950  Date of evaluation: 8/14/2023      Procedure Summary     Date: 08/14/23 Room / Location: [address] [place] [address]    Anesthesia Start: 0596 Anesthesia Stop: 0313    Procedures:       ROBOTIC / LAPAROSCOPIC CHOLECYSTECTOMY, LIVER NODULE EXCISION, MESENTERIC NODULE EXCISION (Abdomen)      . (Abdomen) Diagnosis:       Neuroendocrine tumor of liver      Mass on back      Chronic calculous cholecystitis      (Neuroendocrine tumor of liver [D3A.8])      (Mass on back [R22.2])      (Chronic calculous cholecystitis [K80.10])    Surgeons: Baljeet Paz MD Responsible Provider: Harsha Goldberg MD    Anesthesia Type: general ASA Status: 3          Anesthesia Type: No value filed.     Elvira Phase I: Elvira Score: 9    Elvira Phase II:        Anesthesia Post Evaluation    Patient location during evaluation: PACU  Patient participation: complete - patient participated  Level of consciousness: awake and alert  Airway patency: patent  Nausea & Vomiting: no nausea and no vomiting  Complications: no  Cardiovascular status: hemodynamically stable  Respiratory status: acceptable  Hydration status: euvolemic  Multimodal analgesia pain management approach  Pain management: satisfactory to patient

## 2023-08-14 NOTE — PROGRESS NOTES
[facility] Necessary surgery resident at bedside. Dr. Reese Rosado made aware O2 saturation sustained 92% and above on RA and said can transfer pt. [facility] bed requested.

## 2023-08-14 NOTE — BRIEF OP NOTE
Brief Postoperative Note      Patient: Missael Child  YOB: 1950  MRN: [id number]    Date of Procedure: 8/14/2023    Pre-Op Diagnosis Codes:     * Neuroendocrine tumor of liver [D3A.8]     * Mass on back [R22.2]     * Chronic calculous cholecystitis [K80.10]    Post-Op Diagnosis: Same       Procedure(s):  ROBOTIC / LAPAROSCOPIC CHOLECYSTECTOMY, LIVER NODULE EXCISION, MESENTERIC NODULE EXCISION  . Surgeon(s):  Ari Guzman MD    Assistant:  Surgical Assistant: Jeffrey Zaldivar  Resident: Tamir Carmona MD    Anesthesia: General    Estimated Blood Loss (mL): less than 744     Complications: None    Specimens:   ID Type Source Tests Collected by Time Destination   A : gallbladder Tissue Tissue SURGICAL PATHOLOGY Ari Guzman MD 8/14/2023 8147    B : Liver Nodule Tissue Tissue SURGICAL PATHOLOGY Ari Guzman MD 8/14/2023 9633    C : mesenteric nodule Tissue Tissue SURGICAL PATHOLOGY Ari Guzman MD 8/14/2023 0880        Implants:  * No implants in log *      Drains:   Urinary Catheter 08/14/23 [place] (Active)       Findings: significant inflammation around gallbladder, gallbladder wall friable and was entered during surgery with purulence expressed and suctioned. Diagnostic laparoscopy with excision of mesenteric nodule near terminal ileum. Some patchy liver nodules with one biopsied.    This procedure was not performed to treat colon cancer through resection      Electronically signed by Tamir Carmona MD on 8/14/2023 at 10:30 AM

## 2023-08-14 NOTE — FLOWSHEET NOTE
Residents ordered CXR. Xray tech called. Pt educated on IS use at [phone] and demonstrated to writer proper use.

## 2023-08-14 NOTE — PROGRESS NOTES
1248: one episode of desat to 80% when pt closed her eyes and objectively fell asleep.  Pt recovered with supplemental O2 @ 2L NC.

## 2023-08-14 NOTE — FLOWSHEET NOTE
Pt tolerated a couple ice chips. Pt denies any pain or nausea. Pt  Loly Red called and updated on pt status. Loly Red offered meds to beds and he declined.

## 2023-08-14 NOTE — PROGRESS NOTES
Clarified with Dr. Orellana Patience about surgical consent about the \"Possible excision back mass\" on the Pt. Consent that was not on the surgical schedule. He said it could stay on but it will probably not need to be done. Also received an order to send a CBC and it was sent.

## 2023-08-15 ENCOUNTER — TELEPHONE (OUTPATIENT)
Dept: SURGERY | Age: 73
End: 2023-08-15

## 2023-08-15 DIAGNOSIS — R11.0 NAUSEA: Primary | ICD-10-CM

## 2023-08-15 RX ORDER — ONDANSETRON 8 MG/1
8 TABLET, ORALLY DISINTEGRATING ORAL EVERY 8 HOURS PRN
Qty: 15 TABLET | Refills: 0 | Status: SHIPPED | OUTPATIENT
Start: 2023-08-15 | End: 2023-08-22

## 2023-08-15 NOTE — TELEPHONE ENCOUNTER
Patient called in stating that she is nauseous, but has not vomited    Patient wanted to know if she could have a prescription for anti-nausea medication    Please contact the patient at [phone]

## 2023-08-16 ENCOUNTER — TELEPHONE (OUTPATIENT)
Dept: PRIMARY CARE CLINIC | Age: 73
End: 2023-08-16

## 2023-08-16 DIAGNOSIS — F32.A DEPRESSIVE DISORDER: ICD-10-CM

## 2023-08-16 RX ORDER — VENLAFAXINE HYDROCHLORIDE 75 MG/1
CAPSULE, EXTENDED RELEASE ORAL
Qty: 90 CAPSULE | Refills: 3 | Status: SHIPPED | OUTPATIENT
Start: 2023-08-16

## 2023-08-16 NOTE — TELEPHONE ENCOUNTER
Pt called in needs a refill    venlafaxine (EFFEXOR XR) 75 MG extended release capsule [[id number]]     Order Details  Dose, Route, Frequency: As Directed   Dispense Quantity: 90 capsule Refills: 3          Sig: TAKE ONE CAPSULE BY MOUTH DAILY         Start Date: 07/25/22 End Date: --   Written Date: 07/25/22 Expiration Date: 07/25/23       Diagnosis Association: Depressive disorder (F32.9)   Original Order:  venlafaxine (EFFEXOR XR) 75 MG extended release capsule [[id number]]   Providers    Authorizing Provider: Brooke Martin MD NPI: [id number]   Ordering User:  Brooke Martin, [facility] [id number] - [id number], [address] [place] [address] [phone] - F [phone]   [facility],[address] [address]   Phone:  [phone]  Fax:  [phone]

## 2023-08-16 NOTE — TELEPHONE ENCOUNTER
Medication:   Requested Prescriptions     Pending Prescriptions Disp Refills    venlafaxine (EFFEXOR XR) 75 MG extended release capsule 90 capsule 3     Sig: TAKE ONE CAPSULE BY MOUTH DAILY        Last Filled: 7/25/2022     Patient Phone Number: [phone] (home) [phone] (work)    Last appt: 7/3/2023   Next appt: 11/21/2023    Return if symptoms worsen or fail to improve.

## 2023-08-19 PROBLEM — K63.89 MESENTERIC MASS: Status: ACTIVE | Noted: 2023-08-19

## 2023-08-19 NOTE — OP NOTE
Operative Note      Patient: Augusto Labor  YOB: 1950  MRN: [id number]    Date of Procedure: 8/14/2023    Pre-Op Diagnosis Codes:     * Neuroendocrine tumor of liver [D3A.8]     * Mass on back [R22.2]     * Chronic calculous cholecystitis [K80.10]    Post-Op Diagnosis:  Acute on chronic cholecystitis, liver nodule, mesenteric nodule       Procedure(s):  ROBOTIC / LAPAROSCOPIC CHOLECYSTECTOMY, LIVER NODULE EXCISION, MESENTERIC NODULE EXCISION  . Surgeon(s):  Olga Jamison MD    Assistant:   Surgical Assistant: Silvana Ferrer  Resident: Boogie Reese MD    Anesthesia: General    Estimated Blood Loss (mL): less than 50     Complications: None    Specimens:   ID Type Source Tests Collected by Time Destination   A : gallbladder Tissue Tissue SURGICAL PATHOLOGY Olga Jamison MD 8/14/2023 8722    B : Liver Nodule Tissue Tissue SURGICAL PATHOLOGY Olga Jamison MD 8/14/2023 6488    C : mesenteric nodule Tissue Tissue SURGICAL PATHOLOGY Olga Jamison MD 8/14/2023 0939      Findings: Severe adhesions of gall bladder to colon an duodenum. Extensive lysis of adhesions was done. No definitive primary is seen. This procedure was not performed to treat colon cancer through resection    Operative indications: Patient presented to office with symptoms suggestive of gall bladder disease and CT showed thickening of gall bladder. Robotic-assisted laparoscopic cholecystectomy with intraoperative cholangiogram possible open explained. We will also look for any primary site for neuroendocrine tumor. Risks, benefits & alternatives of surgery explained and patient wishes to proceed with surgery. Operative procedure: The patient was brought from [facility] and placed in a supine position. After induction of anesthesia the abdomen was prepped and draped in a sterile manner. Timeout procedure was performed including giving of antibiotics. A small LUQ incision given.  Peritoneal is closed with 0' vicryl suture. All the port sites skin is closed with 4-0 monocryl in subcuticular manner. Skin glue applied. Sponge & needle count correct at the end of procedure. The patient tolerated the procedure well and was transferred to the PACU in stable condition. I was present for the entire procedure.     Jaylon Kerns MD  Surgical Oncologist  Department of [address] [place] [address] - [place]

## 2023-08-29 ENCOUNTER — OFFICE VISIT (OUTPATIENT)
Dept: SURGERY | Age: 73
End: 2023-08-29

## 2023-08-29 VITALS
SYSTOLIC BLOOD PRESSURE: 141 MMHG | TEMPERATURE: 97.2 F | HEIGHT: 66 IN | BODY MASS INDEX: 25.39 KG/M2 | DIASTOLIC BLOOD PRESSURE: 88 MMHG | WEIGHT: 158 LBS | HEART RATE: 78 BPM

## 2023-08-29 DIAGNOSIS — R16.0 LIVER MASS, RIGHT LOBE: ICD-10-CM

## 2023-08-29 DIAGNOSIS — Z09 POSTOP CHECK: ICD-10-CM

## 2023-08-29 DIAGNOSIS — K63.89 MESENTERIC MASS: ICD-10-CM

## 2023-08-29 DIAGNOSIS — R10.9 ABDOMINAL PAIN, UNSPECIFIED ABDOMINAL LOCATION: ICD-10-CM

## 2023-08-29 DIAGNOSIS — K80.10 CHRONIC CALCULOUS CHOLECYSTITIS: Primary | ICD-10-CM

## 2023-08-29 PROCEDURE — 99024 POSTOP FOLLOW-UP VISIT: CPT | Performed by: NURSE PRACTITIONER

## 2023-09-18 DIAGNOSIS — E78.00 HYPERCHOLESTEROLEMIA: ICD-10-CM

## 2023-09-18 RX ORDER — SIMVASTATIN 20 MG
20 TABLET ORAL DAILY
Qty: 90 TABLET | Refills: 1 | Status: SHIPPED | OUTPATIENT
Start: 2023-09-18

## 2023-09-18 NOTE — TELEPHONE ENCOUNTER
Medication:   Requested Prescriptions     Pending Prescriptions Disp Refills    simvastatin (ZOCOR) 20 MG tablet 90 tablet 1     Sig: Take 1 tablet by mouth daily        Last Filled:  3/20/2023 (sick visit    Patient Phone Number: [phone] (home) [phone] (work)    Last appt: 7/3/2023   Next appt: 11/21/2023    RTC Return in about 3 months (around 7/11/2023).

## 2023-10-05 DIAGNOSIS — K76.9 LIVER LESION: Primary | ICD-10-CM

## 2023-10-23 ENCOUNTER — HOSPITAL ENCOUNTER (OUTPATIENT)
Dept: CT IMAGING | Age: 73
Discharge: HOME | End: 2023-10-23
Payer: MEDICARE

## 2023-10-23 DIAGNOSIS — K76.9 LIVER LESION: ICD-10-CM

## 2023-10-23 LAB
PERFORMED ON: ABNORMAL
POC CREATININE: 0.5 MG/DL (ref 0.6–1.2)
POC SAMPLE TYPE: ABNORMAL

## 2023-10-23 PROCEDURE — 74175 CTA ABDOMEN W/CONTRAST: CPT

## 2023-10-23 PROCEDURE — 6360000004 HC RX CONTRAST MEDICATION

## 2023-10-23 PROCEDURE — 82565 ASSAY OF CREATININE: CPT

## 2023-10-23 RX ADMIN — IOPAMIDOL 75 ML: 755 INJECTION, SOLUTION INTRAVENOUS at 09:42

## 2023-10-25 PROBLEM — E34.00 CARCINOID SYNDROME: Status: ACTIVE | Noted: 2023-03-08

## 2023-10-25 PROBLEM — E34.0 CARCINOID SYNDROME (HCC): Status: ACTIVE | Noted: 2023-03-08

## 2023-11-06 ENCOUNTER — TELEPHONE (OUTPATIENT)
Dept: PRIMARY CARE CLINIC | Age: 73
End: 2023-11-06

## 2023-11-14 SDOH — HEALTH STABILITY: PHYSICAL HEALTH: ON AVERAGE, HOW MANY MINUTES DO YOU ENGAGE IN EXERCISE AT THIS LEVEL?: 30 MIN

## 2023-11-14 SDOH — HEALTH STABILITY: PHYSICAL HEALTH: ON AVERAGE, HOW MANY DAYS PER WEEK DO YOU ENGAGE IN MODERATE TO STRENUOUS EXERCISE (LIKE A BRISK WALK)?: 3 DAYS

## 2023-11-14 ASSESSMENT — PATIENT HEALTH QUESTIONNAIRE - PHQ9
1. LITTLE INTEREST OR PLEASURE IN DOING THINGS: 0
SUM OF ALL RESPONSES TO PHQ QUESTIONS 1-9: 0
SUM OF ALL RESPONSES TO PHQ9 QUESTIONS 1 & 2: 0
2. FEELING DOWN, DEPRESSED OR HOPELESS: 0
SUM OF ALL RESPONSES TO PHQ QUESTIONS 1-9: 0

## 2023-11-14 ASSESSMENT — LIFESTYLE VARIABLES
HAVE YOU OR SOMEONE ELSE BEEN INJURED AS A RESULT OF YOUR DRINKING: 0
HOW OFTEN DURING THE LAST YEAR HAVE YOU FAILED TO DO WHAT WAS NORMALLY EXPECTED FROM YOU BECAUSE OF DRINKING: 0
HOW MANY STANDARD DRINKS CONTAINING ALCOHOL DO YOU HAVE ON A TYPICAL DAY: 1 OR 2
HAS A RELATIVE, FRIEND, DOCTOR, OR ANOTHER HEALTH PROFESSIONAL EXPRESSED CONCERN ABOUT YOUR DRINKING OR SUGGESTED YOU CUT DOWN: 0
HOW OFTEN DURING THE LAST YEAR HAVE YOU HAD A FEELING OF GUILT OR REMORSE AFTER DRINKING: 0
HOW OFTEN DURING THE LAST YEAR HAVE YOU NEEDED AN ALCOHOLIC DRINK FIRST THING IN THE MORNING TO GET YOURSELF GOING AFTER A NIGHT OF HEAVY DRINKING: 0
HOW OFTEN DO YOU HAVE A DRINK CONTAINING ALCOHOL: 4 OR MORE TIMES A WEEK
HOW OFTEN DURING THE LAST YEAR HAVE YOU BEEN UNABLE TO REMEMBER WHAT HAPPENED THE NIGHT BEFORE BECAUSE YOU HAD BEEN DRINKING: 0
HOW OFTEN DURING THE LAST YEAR HAVE YOU FOUND THAT YOU WERE NOT ABLE TO STOP DRINKING ONCE YOU HAD STARTED: 0

## 2023-11-27 PROBLEM — C7A.1 MALIGNANT POORLY DIFFERENTIATED NEUROENDOCRINE TUMORS (HCC): Status: ACTIVE | Noted: 2023-11-21

## 2023-11-28 ENCOUNTER — OFFICE VISIT (OUTPATIENT)
Dept: SURGERY | Age: 73
End: 2023-11-28
Payer: MEDICARE

## 2023-11-28 VITALS
SYSTOLIC BLOOD PRESSURE: 131 MMHG | HEIGHT: 66 IN | HEART RATE: 81 BPM | BODY MASS INDEX: 26.84 KG/M2 | WEIGHT: 167 LBS | DIASTOLIC BLOOD PRESSURE: 73 MMHG | TEMPERATURE: 97.4 F

## 2023-11-28 DIAGNOSIS — D3A.8 NEUROENDOCRINE TUMOR OF LIVER: ICD-10-CM

## 2023-11-28 DIAGNOSIS — K80.10 CHRONIC CALCULOUS CHOLECYSTITIS: Primary | ICD-10-CM

## 2023-11-28 DIAGNOSIS — K63.89 MESENTERIC MASS: ICD-10-CM

## 2023-11-28 PROCEDURE — 1090F PRES/ABSN URINE INCON ASSESS: CPT | Performed by: SURGERY

## 2023-11-28 PROCEDURE — G8399 PT W/DXA RESULTS DOCUMENT: HCPCS | Performed by: SURGERY

## 2023-11-28 PROCEDURE — 3075F SYST BP GE 130 - 139MM HG: CPT | Performed by: SURGERY

## 2023-11-28 PROCEDURE — G8417 CALC BMI ABV UP PARAM F/U: HCPCS | Performed by: SURGERY

## 2023-11-28 PROCEDURE — 99215 OFFICE O/P EST HI 40 MIN: CPT | Performed by: SURGERY

## 2023-11-28 PROCEDURE — G8427 DOCREV CUR MEDS BY ELIG CLIN: HCPCS | Performed by: SURGERY

## 2023-11-28 PROCEDURE — 1123F ACP DISCUSS/DSCN MKR DOCD: CPT | Performed by: SURGERY

## 2023-11-28 PROCEDURE — G9708 BILAT MAST/HX BI /UNILAT MAS: HCPCS | Performed by: SURGERY

## 2023-11-28 PROCEDURE — 1036F TOBACCO NON-USER: CPT | Performed by: SURGERY

## 2023-11-28 PROCEDURE — 3017F COLORECTAL CA SCREEN DOC REV: CPT | Performed by: SURGERY

## 2023-11-28 PROCEDURE — G8484 FLU IMMUNIZE NO ADMIN: HCPCS | Performed by: SURGERY

## 2023-11-28 PROCEDURE — 3078F DIAST BP <80 MM HG: CPT | Performed by: SURGERY

## 2023-12-02 SDOH — HEALTH STABILITY: PHYSICAL HEALTH: ON AVERAGE, HOW MANY DAYS PER WEEK DO YOU ENGAGE IN MODERATE TO STRENUOUS EXERCISE (LIKE A BRISK WALK)?: 3 DAYS

## 2023-12-02 SDOH — HEALTH STABILITY: PHYSICAL HEALTH: ON AVERAGE, HOW MANY MINUTES DO YOU ENGAGE IN EXERCISE AT THIS LEVEL?: 30 MIN

## 2023-12-02 ASSESSMENT — LIFESTYLE VARIABLES
HOW OFTEN DURING THE LAST YEAR HAVE YOU NEEDED AN ALCOHOLIC DRINK FIRST THING IN THE MORNING TO GET YOURSELF GOING AFTER A NIGHT OF HEAVY DRINKING: 0
HOW MANY STANDARD DRINKS CONTAINING ALCOHOL DO YOU HAVE ON A TYPICAL DAY: 1 OR 2
HOW OFTEN DO YOU HAVE A DRINK CONTAINING ALCOHOL: 4 OR MORE TIMES A WEEK
HOW OFTEN DURING THE LAST YEAR HAVE YOU FOUND THAT YOU WERE NOT ABLE TO STOP DRINKING ONCE YOU HAD STARTED: NEVER
HOW OFTEN DURING THE LAST YEAR HAVE YOU FAILED TO DO WHAT WAS NORMALLY EXPECTED FROM YOU BECAUSE OF DRINKING: NEVER
HOW OFTEN DURING THE LAST YEAR HAVE YOU FAILED TO DO WHAT WAS NORMALLY EXPECTED FROM YOU BECAUSE OF DRINKING: 0
HOW MANY STANDARD DRINKS CONTAINING ALCOHOL DO YOU HAVE ON A TYPICAL DAY: 1
HAVE YOU OR SOMEONE ELSE BEEN INJURED AS A RESULT OF YOUR DRINKING: NO
HOW OFTEN DURING THE LAST YEAR HAVE YOU HAD A FEELING OF GUILT OR REMORSE AFTER DRINKING: 0
HOW OFTEN DURING THE LAST YEAR HAVE YOU FOUND THAT YOU WERE NOT ABLE TO STOP DRINKING ONCE YOU HAD STARTED: 0
HOW OFTEN DO YOU HAVE SIX OR MORE DRINKS ON ONE OCCASION: 1
HOW OFTEN DO YOU HAVE A DRINK CONTAINING ALCOHOL: 5
HAVE YOU OR SOMEONE ELSE BEEN INJURED AS A RESULT OF YOUR DRINKING: 0
HAS A RELATIVE, FRIEND, DOCTOR, OR ANOTHER HEALTH PROFESSIONAL EXPRESSED CONCERN ABOUT YOUR DRINKING OR SUGGESTED YOU CUT DOWN: NO
HOW OFTEN DURING THE LAST YEAR HAVE YOU BEEN UNABLE TO REMEMBER WHAT HAPPENED THE NIGHT BEFORE BECAUSE YOU HAD BEEN DRINKING: 0
HOW OFTEN DURING THE LAST YEAR HAVE YOU NEEDED AN ALCOHOLIC DRINK FIRST THING IN THE MORNING TO GET YOURSELF GOING AFTER A NIGHT OF HEAVY DRINKING: NEVER
HOW OFTEN DURING THE LAST YEAR HAVE YOU HAD A FEELING OF GUILT OR REMORSE AFTER DRINKING: NEVER
HAS A RELATIVE, FRIEND, DOCTOR, OR ANOTHER HEALTH PROFESSIONAL EXPRESSED CONCERN ABOUT YOUR DRINKING OR SUGGESTED YOU CUT DOWN: 0
HOW OFTEN DURING THE LAST YEAR HAVE YOU BEEN UNABLE TO REMEMBER WHAT HAPPENED THE NIGHT BEFORE BECAUSE YOU HAD BEEN DRINKING: NEVER

## 2023-12-02 ASSESSMENT — PATIENT HEALTH QUESTIONNAIRE - PHQ9
6. FEELING BAD ABOUT YOURSELF - OR THAT YOU ARE A FAILURE OR HAVE LET YOURSELF OR YOUR FAMILY DOWN: 0
9. THOUGHTS THAT YOU WOULD BE BETTER OFF DEAD, OR OF HURTING YOURSELF: 0
SUM OF ALL RESPONSES TO PHQ QUESTIONS 1-9: 0
2. FEELING DOWN, DEPRESSED OR HOPELESS: 0
1. LITTLE INTEREST OR PLEASURE IN DOING THINGS: 0
SUM OF ALL RESPONSES TO PHQ QUESTIONS 1-9: 0
8. MOVING OR SPEAKING SO SLOWLY THAT OTHER PEOPLE COULD HAVE NOTICED. OR THE OPPOSITE, BEING SO FIGETY OR RESTLESS THAT YOU HAVE BEEN MOVING AROUND A LOT MORE THAN USUAL: 0
3. TROUBLE FALLING OR STAYING ASLEEP: 0
5. POOR APPETITE OR OVEREATING: 0
SUM OF ALL RESPONSES TO PHQ9 QUESTIONS 1 & 2: 0
10. IF YOU CHECKED OFF ANY PROBLEMS, HOW DIFFICULT HAVE THESE PROBLEMS MADE IT FOR YOU TO DO YOUR WORK, TAKE CARE OF THINGS AT HOME, OR GET ALONG WITH OTHER PEOPLE: 0
7. TROUBLE CONCENTRATING ON THINGS, SUCH AS READING THE NEWSPAPER OR WATCHING TELEVISION: 0
4. FEELING TIRED OR HAVING LITTLE ENERGY: 0

## 2023-12-02 ASSESSMENT — COLUMBIA-SUICIDE SEVERITY RATING SCALE - C-SSRS
4. HAVE YOU HAD THESE THOUGHTS AND HAD SOME INTENTION OF ACTING ON THEM?: NO
5. HAVE YOU STARTED TO WORK OUT OR WORKED OUT THE DETAILS OF HOW TO KILL YOURSELF? DO YOU INTEND TO CARRY OUT THIS PLAN?: NO
3. HAVE YOU BEEN THINKING ABOUT HOW YOU MIGHT KILL YOURSELF?: NO
7. DID THIS OCCUR IN THE LAST THREE MONTHS: NO

## 2023-12-05 ENCOUNTER — OFFICE VISIT (OUTPATIENT)
Dept: PRIMARY CARE CLINIC | Age: 73
End: 2023-12-05

## 2023-12-05 VITALS
TEMPERATURE: 97.1 F | OXYGEN SATURATION: 97 % | DIASTOLIC BLOOD PRESSURE: 82 MMHG | SYSTOLIC BLOOD PRESSURE: 131 MMHG | BODY MASS INDEX: 26.68 KG/M2 | HEART RATE: 75 BPM | WEIGHT: 166 LBS | HEIGHT: 66 IN

## 2023-12-05 DIAGNOSIS — Z00.00 MEDICARE ANNUAL WELLNESS VISIT, SUBSEQUENT: Primary | ICD-10-CM

## 2023-12-05 NOTE — PATIENT INSTRUCTIONS
Advance Directives: Care Instructions  Overview  An advance directive is a legal way to state your wishes at the end of your life. It tells your family and your doctor what to do if you can't say what you want. There are two main types of advance directives. You can change them any time your wishes change. Living will. This form tells your family and your doctor your wishes about life support and other treatment. The form is also called a declaration. Medical power of . This form lets you name a person to make treatment decisions for you when you can't speak for yourself. This person is called a health care agent (health care proxy, health care surrogate). The form is also called a durable power of  for health care. If you do not have an advance directive, decisions about your medical care may be made by a family member, or by a doctor or a  who doesn't know you. It may help to think of an advance directive as a gift to the people who care for you. If you have one, they won't have to make tough decisions by themselves. For more information, including forms for your state, see the [address] [place] [address] website (www.caringinfo.org/planning/advance-directives/). Follow-up care is a key part of your treatment and safety. Be sure to make and go to all appointments, and call your doctor if you are having problems. It's also a good idea to know your test results and keep a list of the medicines you take. What should you include in an advance directive? Many states have a unique advance directive form. (It may ask you to address specific issues.) Or you might use a universal form that's approved by many states. If your form doesn't tell you what to address, it may be hard to know what to include in your advance directive. Use the questions below to help you get started. Who do you want to make decisions about your medical care if you are not able to?   What life-support measures do you want if you

## 2023-12-12 DIAGNOSIS — I10 ESSENTIAL HYPERTENSION: ICD-10-CM

## 2023-12-12 RX ORDER — LISINOPRIL 20 MG/1
20 TABLET ORAL DAILY
Qty: 90 TABLET | Refills: 1 | Status: SHIPPED | OUTPATIENT
Start: 2023-12-12 | End: 2024-06-09

## 2023-12-12 NOTE — TELEPHONE ENCOUNTER
Pt called in requesting refill    lisinopril (PRINIVIL;ZESTRIL) 20 MG tablet [[id number]]    Order Details  Dose, Route, Frequency: As Directed   Dispense Quantity: 90 tablet Refills: 1          Sig: TAKE ONE TABLET BY MOUTH DAILY         Start Date: 06/09/23 End Date: --   Written Date: 06/09/23 Expiration Date: 06/08/24       Associated Diagnoses: Essential hypertension [I10]   Original Order: lisinopril (PRINIVIL;ZESTRIL) 20 MG tablet [[id number]]   Providers    Authorizing Provider: Lashell Madsen MD NPI: [id number]   Ordering User: Lashell Madsen, [address] [place] [address] [id number] - [id number], [address] [place] [address] [phone]  [phone]  Meka [address] [place] [address]  Phone: [phone]  Fax: [phone]

## 2023-12-12 NOTE — TELEPHONE ENCOUNTER
Medication:   Requested Prescriptions     Pending Prescriptions Disp Refills    lisinopril (PRINIVIL;ZESTRIL) 20 MG tablet 90 tablet 1     Sig: Take 1 tablet by mouth daily        Last Filled:  6/9/23    Patient Phone Number: [phone] (home) [phone] (work)    Last appt: 12/5/2023   Next appt: 2/1/2024    Last OARRS:        No data to display

## 2024-01-12 NOTE — PROGRESS NOTES
[facility] SURGICAL ONCOLOGY  [address] [place] [address]  [address]  [place] [id number]  Dept: [phone]  Dept Fax: [phone]    Visit Date: 11/28/2023    HPI:   Alanna Sam is a 72 y.o. female who returns today to follow up on her Well differentiated neuroendocrine tumor-grade 2. S/P Robotic/Laparoscopic Cholecystectomy, Liver Nodule Excision, And Mesenteric Nodule Excision 8/14/23. Patient is followed by Dr. Olmos and is receiving monthly lanreotide shots.     Patient also followed by Dr. Henley at [place] for consideration of Lutathera. Recent biopsy of the liver showed Metastatic well-differentiated neuroendocrine tumor with focal necrosis.   No new symptoms.    Past Medical History:   Diagnosis Date    Allergic rhinitis     Basal cell cancer 05/2014    Forehead: Dr. Mcconnell    BRCA positive     Calculus of gallbladder without cholecystitis without obstruction 02/28/2023    Depressive disorder     Full dentures     partial    Hearing loss     HTN (hypertension)     Hx of bilateral mastectomy 07/27/2015    Hypercholesterolemia     Malignant neoplasm of female breast (HCC)     s/p B Mastectomy - Dr. Mukherjee 2004    Mild aortic sclerosis 01/17/2019    ECHO 01/17/19    Neuroendocrine tumor of liver 02/28/2023    Non-thrombocytopenic purpura (HCC)     Osteopenia     DXA 10/12/11 Improving densitiy L hip    Skin lesion     4mm lesion L nose- dr. Mcconnell - (benign)    Stress incontinence 10/05/2012    Trace aortic valve regurgitation 01/17/2019    ECHO 01/17/19    Vertigo     Wears glasses      Past Surgical History:   Procedure Laterality Date    APPENDECTOMY  01/01/1975    CHOLECYSTECTOMY, LAPAROSCOPIC N/A 8/14/2023    ROBOTIC / LAPAROSCOPIC CHOLECYSTECTOMY, LIVER NODULE EXCISION, MESENTERIC NODULE EXCISION performed by Heriberto Archer MD at [facility] OR    COLECTOMY N/A 8/14/2023    . performed by Heriberto Archer MD at [facility] OR    COLONOSCOPY  08/11/2006    CT NEEDLE BIOPSY LIVER PERCUTANEOUS

## 2024-02-01 ASSESSMENT — PATIENT HEALTH QUESTIONNAIRE - PHQ9
7. TROUBLE CONCENTRATING ON THINGS, SUCH AS READING THE NEWSPAPER OR WATCHING TELEVISION: 0
8. MOVING OR SPEAKING SO SLOWLY THAT OTHER PEOPLE COULD HAVE NOTICED. OR THE OPPOSITE, BEING SO FIGETY OR RESTLESS THAT YOU HAVE BEEN MOVING AROUND A LOT MORE THAN USUAL: 0
10. IF YOU CHECKED OFF ANY PROBLEMS, HOW DIFFICULT HAVE THESE PROBLEMS MADE IT FOR YOU TO DO YOUR WORK, TAKE CARE OF THINGS AT HOME, OR GET ALONG WITH OTHER PEOPLE: 0
6. FEELING BAD ABOUT YOURSELF - OR THAT YOU ARE A FAILURE OR HAVE LET YOURSELF OR YOUR FAMILY DOWN: 0
5. POOR APPETITE OR OVEREATING: NOT AT ALL
SUM OF ALL RESPONSES TO PHQ QUESTIONS 1-9: 1
2. FEELING DOWN, DEPRESSED OR HOPELESS: NOT AT ALL
SUM OF ALL RESPONSES TO PHQ9 QUESTIONS 1 & 2: 0
3. TROUBLE FALLING OR STAYING ASLEEP: 0
5. POOR APPETITE OR OVEREATING: 0
1. LITTLE INTEREST OR PLEASURE IN DOING THINGS: 0
6. FEELING BAD ABOUT YOURSELF - OR THAT YOU ARE A FAILURE OR HAVE LET YOURSELF OR YOUR FAMILY DOWN: NOT AT ALL
SUM OF ALL RESPONSES TO PHQ QUESTIONS 1-9: 1
SUM OF ALL RESPONSES TO PHQ QUESTIONS 1-9: 1
4. FEELING TIRED OR HAVING LITTLE ENERGY: 1
2. FEELING DOWN, DEPRESSED OR HOPELESS: 0
9. THOUGHTS THAT YOU WOULD BE BETTER OFF DEAD, OR OF HURTING YOURSELF: 0
10. IF YOU CHECKED OFF ANY PROBLEMS, HOW DIFFICULT HAVE THESE PROBLEMS MADE IT FOR YOU TO DO YOUR WORK, TAKE CARE OF THINGS AT HOME, OR GET ALONG WITH OTHER PEOPLE: NOT DIFFICULT AT ALL
3. TROUBLE FALLING OR STAYING ASLEEP: NOT AT ALL
8. MOVING OR SPEAKING SO SLOWLY THAT OTHER PEOPLE COULD HAVE NOTICED. OR THE OPPOSITE - BEING SO FIDGETY OR RESTLESS THAT YOU HAVE BEEN MOVING AROUND A LOT MORE THAN USUAL: NOT AT ALL
1. LITTLE INTEREST OR PLEASURE IN DOING THINGS: NOT AT ALL
4. FEELING TIRED OR HAVING LITTLE ENERGY: SEVERAL DAYS
SUM OF ALL RESPONSES TO PHQ QUESTIONS 1-9: 1
7. TROUBLE CONCENTRATING ON THINGS, SUCH AS READING THE NEWSPAPER OR WATCHING TELEVISION: NOT AT ALL
9. THOUGHTS THAT YOU WOULD BE BETTER OFF DEAD, OR OF HURTING YOURSELF: NOT AT ALL
SUM OF ALL RESPONSES TO PHQ QUESTIONS 1-9: 1

## 2024-02-02 ENCOUNTER — OFFICE VISIT (OUTPATIENT)
Dept: PRIMARY CARE CLINIC | Age: 74
End: 2024-02-02

## 2024-02-02 VITALS
WEIGHT: 169 LBS | DIASTOLIC BLOOD PRESSURE: 70 MMHG | SYSTOLIC BLOOD PRESSURE: 133 MMHG | HEART RATE: 75 BPM | TEMPERATURE: 97.2 F | OXYGEN SATURATION: 98 % | BODY MASS INDEX: 27.16 KG/M2 | HEIGHT: 66 IN

## 2024-02-02 DIAGNOSIS — R35.0 URINARY FREQUENCY: Primary | ICD-10-CM

## 2024-02-02 DIAGNOSIS — I10 ESSENTIAL HYPERTENSION, BENIGN: ICD-10-CM

## 2024-02-02 DIAGNOSIS — Z12.4 CERVICAL CANCER SCREENING: ICD-10-CM

## 2024-02-02 DIAGNOSIS — E34.0 CARCINOID SYNDROME (HCC): ICD-10-CM

## 2024-02-02 DIAGNOSIS — R79.9 ABNORMAL FINDING OF BLOOD CHEMISTRY, UNSPECIFIED: ICD-10-CM

## 2024-02-02 DIAGNOSIS — F32.5 MAJOR DEPRESSION IN REMISSION (HCC): ICD-10-CM

## 2024-02-02 DIAGNOSIS — C7A.00 MALIGNANT CARCINOID TUMOR, UNSPECIFIED SITE (HCC): ICD-10-CM

## 2024-02-02 PROBLEM — K80.20 CALCULUS OF GALLBLADDER: Status: RESOLVED | Noted: 2023-02-28 | Resolved: 2024-02-02

## 2024-02-02 PROBLEM — K63.89 MESENTERIC MASS: Status: RESOLVED | Noted: 2023-08-19 | Resolved: 2024-02-02

## 2024-02-02 PROBLEM — C79.51 SECONDARY ADENOCARCINOMA OF BONE (HCC): Status: ACTIVE | Noted: 2024-01-02

## 2024-02-02 LAB
BILIRUBIN, POC: ABNORMAL
BLOOD URINE, POC: ABNORMAL
CLARITY, POC: CLEAR
COLOR, POC: ABNORMAL
GLUCOSE URINE, POC: 100
KETONES, POC: ABNORMAL
LEUKOCYTE EST, POC: ABNORMAL
NITRITE, POC: ABNORMAL
PH, POC: 6.5
PROTEIN, POC: 100
SPECIFIC GRAVITY, POC: 1.02
UROBILINOGEN, POC: 0.2

## 2024-02-02 RX ORDER — SULFAMETHOXAZOLE AND TRIMETHOPRIM 800; 160 MG/1; MG/1
1 TABLET ORAL 2 TIMES DAILY
Qty: 6 TABLET | Refills: 0 | Status: SHIPPED | OUTPATIENT
Start: 2024-02-02 | End: 2024-02-05

## 2024-02-02 ASSESSMENT — ENCOUNTER SYMPTOMS
NAUSEA: 0
WHEEZING: 0
SHORTNESS OF BREATH: 0

## 2024-02-02 NOTE — PROGRESS NOTES
lower leg: No edema.      Left lower leg: No edema.   Skin:     General: Skin is warm and dry.   Neurological:      Mental Status: She is alert.         Carmelina Cavanaugh DO    This dictation was generated by voice recognition computer software.  Although all attempts are made to edit the dictation for accuracy, there may be errors in the transcription that are not intended.

## 2024-02-03 LAB
ALBUMIN SERPL-MCNC: 4.7 G/DL (ref 3.4–5)
ALBUMIN/GLOB SERPL: 1.9 {RATIO} (ref 1.1–2.2)
ALP SERPL-CCNC: 547 U/L (ref 40–129)
ALT SERPL-CCNC: 91 U/L (ref 10–40)
ANION GAP SERPL CALCULATED.3IONS-SCNC: 10 MMOL/L (ref 3–16)
AST SERPL-CCNC: 75 U/L (ref 15–37)
BILIRUB SERPL-MCNC: 0.6 MG/DL (ref 0–1)
BUN SERPL-MCNC: 14 MG/DL (ref 7–20)
CALCIUM SERPL-MCNC: 9.3 MG/DL (ref 8.3–10.6)
CHLORIDE SERPL-SCNC: 99 MMOL/L (ref 99–110)
CHOLEST SERPL-MCNC: 219 MG/DL (ref 0–199)
CO2 SERPL-SCNC: 31 MMOL/L (ref 21–32)
CREAT SERPL-MCNC: 0.7 MG/DL (ref 0.6–1.2)
EST. AVERAGE GLUCOSE BLD GHB EST-MCNC: 134.1 MG/DL
GFR SERPLBLD CREATININE-BSD FMLA CKD-EPI: >60 ML/MIN/{1.73_M2}
GLUCOSE SERPL-MCNC: 92 MG/DL (ref 70–99)
HBA1C MFR BLD: 6.3 %
HDLC SERPL-MCNC: 111 MG/DL (ref 40–60)
LDLC SERPL CALC-MCNC: 92 MG/DL
POTASSIUM SERPL-SCNC: 4 MMOL/L (ref 3.5–5.1)
PROT SERPL-MCNC: 7.2 G/DL (ref 6.4–8.2)
SODIUM SERPL-SCNC: 140 MMOL/L (ref 136–145)
TRIGL SERPL-MCNC: 80 MG/DL (ref 0–150)
VLDLC SERPL CALC-MCNC: 16 MG/DL

## 2024-02-21 ENCOUNTER — TELEPHONE (OUTPATIENT)
Dept: SURGERY | Age: 74
End: 2024-02-21

## 2024-02-23 RX ORDER — PYRIDOXINE HCL (VITAMIN B6) 100 MG
1000 TABLET ORAL
COMMUNITY

## 2024-02-23 RX ORDER — OMEGA-3/DHA/EPA/FISH OIL 300-1000MG
CAPSULE ORAL
COMMUNITY

## 2024-02-23 RX ORDER — VITAMIN B COMPLEX
1 CAPSULE ORAL
COMMUNITY

## 2024-02-27 ENCOUNTER — TELEPHONE (OUTPATIENT)
Dept: PRIMARY CARE CLINIC | Age: 74
End: 2024-02-27

## 2024-02-27 ENCOUNTER — OFFICE VISIT (OUTPATIENT)
Dept: SURGERY | Age: 74
End: 2024-02-27
Payer: MEDICARE

## 2024-02-27 VITALS
HEART RATE: 70 BPM | HEIGHT: 66 IN | BODY MASS INDEX: 27 KG/M2 | TEMPERATURE: 98 F | WEIGHT: 168 LBS | SYSTOLIC BLOOD PRESSURE: 128 MMHG | DIASTOLIC BLOOD PRESSURE: 78 MMHG

## 2024-02-27 DIAGNOSIS — K80.10 CHRONIC CALCULOUS CHOLECYSTITIS: Primary | ICD-10-CM

## 2024-02-27 DIAGNOSIS — D3A.8 NEUROENDOCRINE TUMOR OF LIVER: ICD-10-CM

## 2024-02-27 DIAGNOSIS — K63.89 MESENTERIC MASS: ICD-10-CM

## 2024-02-27 PROCEDURE — G8427 DOCREV CUR MEDS BY ELIG CLIN: HCPCS | Performed by: SURGERY

## 2024-02-27 PROCEDURE — G9708 BILAT MAST/HX BI /UNILAT MAS: HCPCS | Performed by: SURGERY

## 2024-02-27 PROCEDURE — G8417 CALC BMI ABV UP PARAM F/U: HCPCS | Performed by: SURGERY

## 2024-02-27 PROCEDURE — G8399 PT W/DXA RESULTS DOCUMENT: HCPCS | Performed by: SURGERY

## 2024-02-27 PROCEDURE — 3078F DIAST BP <80 MM HG: CPT | Performed by: SURGERY

## 2024-02-27 PROCEDURE — G8484 FLU IMMUNIZE NO ADMIN: HCPCS | Performed by: SURGERY

## 2024-02-27 PROCEDURE — 3017F COLORECTAL CA SCREEN DOC REV: CPT | Performed by: SURGERY

## 2024-02-27 PROCEDURE — 1036F TOBACCO NON-USER: CPT | Performed by: SURGERY

## 2024-02-27 PROCEDURE — 99213 OFFICE O/P EST LOW 20 MIN: CPT | Performed by: SURGERY

## 2024-02-27 PROCEDURE — 1090F PRES/ABSN URINE INCON ASSESS: CPT | Performed by: SURGERY

## 2024-02-27 PROCEDURE — 1123F ACP DISCUSS/DSCN MKR DOCD: CPT | Performed by: SURGERY

## 2024-02-27 PROCEDURE — 3074F SYST BP LT 130 MM HG: CPT | Performed by: SURGERY

## 2024-02-27 NOTE — TELEPHONE ENCOUNTER
Spoke with patient about carbs and diet with new pre diabetes diagnosis. Limiting carbohydrate intake as well as cutting down on wine except for the weekends. Healthy snack options provided as well as low carb breads and wraps. Fruits and vegetables that are higher in sugar try to monitor intake of those as well.

## 2024-03-04 ENCOUNTER — OFFICE VISIT (OUTPATIENT)
Age: 74
End: 2024-03-04
Payer: MEDICARE

## 2024-03-04 VITALS — HEART RATE: 72 BPM | OXYGEN SATURATION: 96 % | DIASTOLIC BLOOD PRESSURE: 74 MMHG | SYSTOLIC BLOOD PRESSURE: 112 MMHG

## 2024-03-04 DIAGNOSIS — Z01.419 WELL WOMAN EXAM WITH ROUTINE GYNECOLOGICAL EXAM: Primary | ICD-10-CM

## 2024-03-04 PROCEDURE — G8427 DOCREV CUR MEDS BY ELIG CLIN: HCPCS | Performed by: OBSTETRICS & GYNECOLOGY

## 2024-03-04 PROCEDURE — G0101 CA SCREEN;PELVIC/BREAST EXAM: HCPCS | Performed by: OBSTETRICS & GYNECOLOGY

## 2024-03-04 PROCEDURE — G8417 CALC BMI ABV UP PARAM F/U: HCPCS | Performed by: OBSTETRICS & GYNECOLOGY

## 2024-03-04 NOTE — PROGRESS NOTES
The sensitive parts of the examination were performed with Maira Thakur MA as a chaperone.  Maira was present during the entirety of the sensitive parts of the examination.

## 2024-03-04 NOTE — PROGRESS NOTES
SUBJECTIVE:  Alanna Sam is an 73 y.o. year old woman who presents for annual gyn exam.    The patient has a history of BRCA1 and metastatic breast cancer  She is status post hysterectomy with BSO in her 40s    Past medical history and ongoing evaluation and treatment reviewed.    REVIEW OF SYSTEMS:  No complaints of symptoms involving:  Constitutional: There's been no change in activity level. Negative for fever, chills.  Eyes: No visual changes, double vision, or scotomata. No scleral icterus.  HENT: No Headaches, hearing loss or vertigo. No sore throat, ear pain or nasal congestion  Respiratory: no cough or wheezing, no sputum production, no hemoptysis.    Gastrointestinal:  No change in bowel habits.  Genitourinary: No dysuria, trouble voiding, or hematuria. No change in bladder habits.  Musculoskeletal:  No gait disturbance,no weakness or joint complaints.  Skin: No rash or pruritis.  Neurological: No headache, vision changes, change in muscle strength, numbness or tingling. No change in gait, balance, coordination.  Psychiatric: No anxiety, or depression. No change in mood or behavior.  Endocrine: No temperature intolerance. No excessive thirst, fluid intake, or urination. No tremor.  Hematologic/Lymphatic: No abnormal bruising or bleeding, blood clots or swollen lymph nodes.    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

## 2024-03-18 DIAGNOSIS — E78.00 HYPERCHOLESTEROLEMIA: ICD-10-CM

## 2024-03-18 RX ORDER — SIMVASTATIN 20 MG
20 TABLET ORAL DAILY
Qty: 90 TABLET | Refills: 3 | Status: SHIPPED | OUTPATIENT
Start: 2024-03-18

## 2024-03-18 NOTE — TELEPHONE ENCOUNTER
Medication:   Requested Prescriptions     Pending Prescriptions Disp Refills    simvastatin (ZOCOR) 20 MG tablet [Pharmacy Med Name: SIMVASTATIN 20 MG TABLET] 90 tablet 1     Sig: TAKE 1 TABLET BY MOUTH DAILY        Last Filled:  9/18/23    Patient Phone Number: [phone] (home) [phone] (work)    Last appt: 2/2/2024   Next appt: 8/5/2024    Last OARRS:        No data to display

## 2024-06-07 DIAGNOSIS — I10 ESSENTIAL HYPERTENSION: ICD-10-CM

## 2024-06-07 RX ORDER — LISINOPRIL 20 MG/1
20 TABLET ORAL DAILY
Qty: 90 TABLET | Refills: 1 | Status: SHIPPED | OUTPATIENT
Start: 2024-06-07 | End: 2024-12-04

## 2024-06-07 NOTE — TELEPHONE ENCOUNTER
Medication:   Requested Prescriptions     Pending Prescriptions Disp Refills    lisinopril (PRINIVIL;ZESTRIL) 20 MG tablet [Pharmacy Med Name: LISINOPRIL 20 MG TABLET] 90 tablet 1     Sig: TAKE 1 TABLET BY MOUTH DAILY        Last Filled:  12/12/23    Patient Phone Number: [phone] (home) [phone] (work)    Last appt: 2/2/2024 Return in about 6 months (around 8/2/2024) for mood recheck, blood pressure follow-up.   Next appt: 8/5/2024    Last OARRS:        No data to display

## 2024-07-21 DIAGNOSIS — I10 ESSENTIAL HYPERTENSION: ICD-10-CM

## 2024-07-22 RX ORDER — AMLODIPINE BESYLATE 5 MG/1
TABLET ORAL
Qty: 90 TABLET | Refills: 3 | Status: SHIPPED | OUTPATIENT
Start: 2024-07-22

## 2024-07-22 NOTE — TELEPHONE ENCOUNTER
Medication:   Requested Prescriptions     Pending Prescriptions Disp Refills    metoprolol tartrate (LOPRESSOR) 25 MG tablet [Pharmacy Med Name: METOPROLOL TARTRATE 25 MG TAB] 180 tablet 3     Sig: TAKE 1 TABLET BY MOUTH TWICE A DAY    amLODIPine (NORVASC) 5 MG tablet [Pharmacy Med Name: AMLODIPINE BESYLATE 5 MG TAB] 90 tablet 3     Sig: TAKE 1 TABLET BY MOUTH DAILY        Last Filled:  07/24/23    Patient Phone Number: [phone] (home) [phone] (work)    Last appt: 2/2/2024   Next appt: 8/5/2024    Last OARRS:        No data to display

## 2024-08-02 SDOH — ECONOMIC STABILITY: FOOD INSECURITY: WITHIN THE PAST 12 MONTHS, YOU WORRIED THAT YOUR FOOD WOULD RUN OUT BEFORE YOU GOT MONEY TO BUY MORE.: NEVER TRUE

## 2024-08-02 SDOH — ECONOMIC STABILITY: FOOD INSECURITY: WITHIN THE PAST 12 MONTHS, THE FOOD YOU BOUGHT JUST DIDN'T LAST AND YOU DIDN'T HAVE MONEY TO GET MORE.: NEVER TRUE

## 2024-08-02 SDOH — ECONOMIC STABILITY: INCOME INSECURITY: HOW HARD IS IT FOR YOU TO PAY FOR THE VERY BASICS LIKE FOOD, HOUSING, MEDICAL CARE, AND HEATING?: NOT HARD AT ALL

## 2024-08-05 ENCOUNTER — OFFICE VISIT (OUTPATIENT)
Dept: PRIMARY CARE CLINIC | Age: 74
End: 2024-08-05
Payer: MEDICARE

## 2024-08-05 VITALS
HEIGHT: 66 IN | SYSTOLIC BLOOD PRESSURE: 112 MMHG | WEIGHT: 161.25 LBS | HEART RATE: 65 BPM | BODY MASS INDEX: 25.91 KG/M2 | TEMPERATURE: 97.8 F | DIASTOLIC BLOOD PRESSURE: 59 MMHG

## 2024-08-05 DIAGNOSIS — C79.51 SECONDARY ADENOCARCINOMA OF BONE (HCC): ICD-10-CM

## 2024-08-05 DIAGNOSIS — R73.03 PREDIABETES: ICD-10-CM

## 2024-08-05 DIAGNOSIS — I10 PRIMARY HYPERTENSION: ICD-10-CM

## 2024-08-05 DIAGNOSIS — E78.00 PURE HYPERCHOLESTEROLEMIA: ICD-10-CM

## 2024-08-05 DIAGNOSIS — I10 PRIMARY HYPERTENSION: Primary | ICD-10-CM

## 2024-08-05 PROCEDURE — G9708 BILAT MAST/HX BI /UNILAT MAS: HCPCS | Performed by: STUDENT IN AN ORGANIZED HEALTH CARE EDUCATION/TRAINING PROGRAM

## 2024-08-05 PROCEDURE — G8417 CALC BMI ABV UP PARAM F/U: HCPCS | Performed by: STUDENT IN AN ORGANIZED HEALTH CARE EDUCATION/TRAINING PROGRAM

## 2024-08-05 PROCEDURE — 99214 OFFICE O/P EST MOD 30 MIN: CPT | Performed by: STUDENT IN AN ORGANIZED HEALTH CARE EDUCATION/TRAINING PROGRAM

## 2024-08-05 PROCEDURE — 1090F PRES/ABSN URINE INCON ASSESS: CPT | Performed by: STUDENT IN AN ORGANIZED HEALTH CARE EDUCATION/TRAINING PROGRAM

## 2024-08-05 PROCEDURE — 1123F ACP DISCUSS/DSCN MKR DOCD: CPT | Performed by: STUDENT IN AN ORGANIZED HEALTH CARE EDUCATION/TRAINING PROGRAM

## 2024-08-05 PROCEDURE — 1036F TOBACCO NON-USER: CPT | Performed by: STUDENT IN AN ORGANIZED HEALTH CARE EDUCATION/TRAINING PROGRAM

## 2024-08-05 PROCEDURE — G8427 DOCREV CUR MEDS BY ELIG CLIN: HCPCS | Performed by: STUDENT IN AN ORGANIZED HEALTH CARE EDUCATION/TRAINING PROGRAM

## 2024-08-05 PROCEDURE — G8399 PT W/DXA RESULTS DOCUMENT: HCPCS | Performed by: STUDENT IN AN ORGANIZED HEALTH CARE EDUCATION/TRAINING PROGRAM

## 2024-08-05 PROCEDURE — 3078F DIAST BP <80 MM HG: CPT | Performed by: STUDENT IN AN ORGANIZED HEALTH CARE EDUCATION/TRAINING PROGRAM

## 2024-08-05 PROCEDURE — 3017F COLORECTAL CA SCREEN DOC REV: CPT | Performed by: STUDENT IN AN ORGANIZED HEALTH CARE EDUCATION/TRAINING PROGRAM

## 2024-08-05 PROCEDURE — 3074F SYST BP LT 130 MM HG: CPT | Performed by: STUDENT IN AN ORGANIZED HEALTH CARE EDUCATION/TRAINING PROGRAM

## 2024-08-05 RX ORDER — LETROZOLE 2.5 MG/1
2.5 TABLET, FILM COATED ORAL DAILY
COMMUNITY

## 2024-08-05 SDOH — ECONOMIC STABILITY: INCOME INSECURITY: HOW HARD IS IT FOR YOU TO PAY FOR THE VERY BASICS LIKE FOOD, HOUSING, MEDICAL CARE, AND HEATING?: NOT HARD AT ALL

## 2024-08-05 SDOH — ECONOMIC STABILITY: FOOD INSECURITY: WITHIN THE PAST 12 MONTHS, YOU WORRIED THAT YOUR FOOD WOULD RUN OUT BEFORE YOU GOT MONEY TO BUY MORE.: NEVER TRUE

## 2024-08-05 SDOH — ECONOMIC STABILITY: FOOD INSECURITY: WITHIN THE PAST 12 MONTHS, THE FOOD YOU BOUGHT JUST DIDN'T LAST AND YOU DIDN'T HAVE MONEY TO GET MORE.: NEVER TRUE

## 2024-08-05 ASSESSMENT — ENCOUNTER SYMPTOMS
WHEEZING: 0
SHORTNESS OF BREATH: 0
NAUSEA: 0

## 2024-08-05 NOTE — PROGRESS NOTES
Status: She is alert.         Carmelina Cavanaugh,     This dictation was generated by voice recognition computer software.  Although all attempts are made to edit the dictation for accuracy, there may be errors in the transcription that are not intended.

## 2024-08-06 LAB
ALBUMIN SERPL-MCNC: 4.6 G/DL (ref 3.4–5)
ALBUMIN/GLOB SERPL: 1.8 {RATIO} (ref 1.1–2.2)
ALP SERPL-CCNC: 301 U/L (ref 40–129)
ALT SERPL-CCNC: 50 U/L (ref 10–40)
ANION GAP SERPL CALCULATED.3IONS-SCNC: 13 MMOL/L (ref 3–16)
AST SERPL-CCNC: 36 U/L (ref 15–37)
BILIRUB SERPL-MCNC: 0.6 MG/DL (ref 0–1)
BUN SERPL-MCNC: 19 MG/DL (ref 7–20)
CALCIUM SERPL-MCNC: 9.9 MG/DL (ref 8.3–10.6)
CHLORIDE SERPL-SCNC: 102 MMOL/L (ref 99–110)
CHOLEST SERPL-MCNC: 202 MG/DL (ref 0–199)
CO2 SERPL-SCNC: 28 MMOL/L (ref 21–32)
CREAT SERPL-MCNC: 0.8 MG/DL (ref 0.6–1.2)
EST. AVERAGE GLUCOSE BLD GHB EST-MCNC: 111.2 MG/DL
GFR SERPLBLD CREATININE-BSD FMLA CKD-EPI: 78 ML/MIN/{1.73_M2}
GLUCOSE SERPL-MCNC: 97 MG/DL (ref 70–99)
HBA1C MFR BLD: 5.5 %
HDLC SERPL-MCNC: 95 MG/DL (ref 40–60)
LDLC SERPL CALC-MCNC: 87 MG/DL
POTASSIUM SERPL-SCNC: 4.4 MMOL/L (ref 3.5–5.1)
PROT SERPL-MCNC: 7.2 G/DL (ref 6.4–8.2)
SODIUM SERPL-SCNC: 143 MMOL/L (ref 136–145)
TRIGL SERPL-MCNC: 101 MG/DL (ref 0–150)
VLDLC SERPL CALC-MCNC: 20 MG/DL

## 2024-08-11 DIAGNOSIS — F32.A DEPRESSIVE DISORDER: ICD-10-CM

## 2024-08-12 RX ORDER — VENLAFAXINE HYDROCHLORIDE 75 MG/1
CAPSULE, EXTENDED RELEASE ORAL
Qty: 90 CAPSULE | Refills: 3 | Status: SHIPPED | OUTPATIENT
Start: 2024-08-12

## 2024-08-12 NOTE — TELEPHONE ENCOUNTER
Medication:   Requested Prescriptions     Pending Prescriptions Disp Refills    venlafaxine (EFFEXOR XR) 75 MG extended release capsule [Pharmacy Med Name: VENLAFAXINE HCL ER 75 MG CAP] 90 capsule 3     Sig: TAKE 1 CAPSULE BY MOUTH DAILY        Last Filled: 8/16/23     Patient Phone Number: [phone] (home) [phone] (work)    Last appt: 8/5/2024   Next appt: 12/10/2024    Last OARRS:        No data to display

## 2024-08-21 ENCOUNTER — TELEPHONE (OUTPATIENT)
Dept: SURGERY | Age: 74
End: 2024-08-21

## 2024-08-21 NOTE — TELEPHONE ENCOUNTER
Patient called in stating that she had a CT scan done on 4/12/24 and a bone scan done on 4/19/24 at  in [place]     Patient states that she also had another CT scan and bone scan done on 7/30/24 at  in [place]     Please contact the patient at [phone]

## 2024-08-22 ENCOUNTER — TELEPHONE (OUTPATIENT)
Dept: SURGERY | Age: 74
End: 2024-08-22

## 2024-08-22 NOTE — TELEPHONE ENCOUNTER
MA called patient to move appointment from Tuesday the 27 th to Friday the 30 th there was no answer, MA did leave a voicemail asking patient to please call MA back to reschedule

## 2024-08-30 ENCOUNTER — OFFICE VISIT (OUTPATIENT)
Dept: SURGERY | Age: 74
End: 2024-08-30
Payer: MEDICARE

## 2024-08-30 VITALS
HEIGHT: 66 IN | OXYGEN SATURATION: 99 % | SYSTOLIC BLOOD PRESSURE: 136 MMHG | DIASTOLIC BLOOD PRESSURE: 68 MMHG | RESPIRATION RATE: 18 BRPM | TEMPERATURE: 98 F | HEART RATE: 66 BPM | BODY MASS INDEX: 26.52 KG/M2 | WEIGHT: 165 LBS

## 2024-08-30 DIAGNOSIS — K80.10 CHRONIC CALCULOUS CHOLECYSTITIS: Primary | ICD-10-CM

## 2024-08-30 DIAGNOSIS — K63.89 MESENTERIC MASS: ICD-10-CM

## 2024-08-30 DIAGNOSIS — D3A.8 NEUROENDOCRINE TUMOR OF LIVER: ICD-10-CM

## 2024-08-30 PROCEDURE — G8417 CALC BMI ABV UP PARAM F/U: HCPCS | Performed by: SURGERY

## 2024-08-30 PROCEDURE — G9708 BILAT MAST/HX BI /UNILAT MAS: HCPCS | Performed by: SURGERY

## 2024-08-30 PROCEDURE — 3075F SYST BP GE 130 - 139MM HG: CPT | Performed by: SURGERY

## 2024-08-30 PROCEDURE — 3078F DIAST BP <80 MM HG: CPT | Performed by: SURGERY

## 2024-08-30 PROCEDURE — 1090F PRES/ABSN URINE INCON ASSESS: CPT | Performed by: SURGERY

## 2024-08-30 PROCEDURE — 1123F ACP DISCUSS/DSCN MKR DOCD: CPT | Performed by: SURGERY

## 2024-08-30 PROCEDURE — 3017F COLORECTAL CA SCREEN DOC REV: CPT | Performed by: SURGERY

## 2024-08-30 PROCEDURE — G8399 PT W/DXA RESULTS DOCUMENT: HCPCS | Performed by: SURGERY

## 2024-08-30 PROCEDURE — 99213 OFFICE O/P EST LOW 20 MIN: CPT | Performed by: SURGERY

## 2024-08-30 PROCEDURE — G8427 DOCREV CUR MEDS BY ELIG CLIN: HCPCS | Performed by: SURGERY

## 2024-08-30 PROCEDURE — 1036F TOBACCO NON-USER: CPT | Performed by: SURGERY

## 2024-12-07 SDOH — HEALTH STABILITY: PHYSICAL HEALTH: ON AVERAGE, HOW MANY DAYS PER WEEK DO YOU ENGAGE IN MODERATE TO STRENUOUS EXERCISE (LIKE A BRISK WALK)?: 4 DAYS

## 2024-12-07 SDOH — HEALTH STABILITY: PHYSICAL HEALTH: ON AVERAGE, HOW MANY MINUTES DO YOU ENGAGE IN EXERCISE AT THIS LEVEL?: 30 MIN

## 2024-12-07 ASSESSMENT — PATIENT HEALTH QUESTIONNAIRE - PHQ9
SUM OF ALL RESPONSES TO PHQ QUESTIONS 1-9: 0
SUM OF ALL RESPONSES TO PHQ QUESTIONS 1-9: 0
7. TROUBLE CONCENTRATING ON THINGS, SUCH AS READING THE NEWSPAPER OR WATCHING TELEVISION: NOT AT ALL
1. LITTLE INTEREST OR PLEASURE IN DOING THINGS: NOT AT ALL
4. FEELING TIRED OR HAVING LITTLE ENERGY: NOT AT ALL
3. TROUBLE FALLING OR STAYING ASLEEP: NOT AT ALL
SUM OF ALL RESPONSES TO PHQ QUESTIONS 1-9: 0
10. IF YOU CHECKED OFF ANY PROBLEMS, HOW DIFFICULT HAVE THESE PROBLEMS MADE IT FOR YOU TO DO YOUR WORK, TAKE CARE OF THINGS AT HOME, OR GET ALONG WITH OTHER PEOPLE: NOT DIFFICULT AT ALL
9. THOUGHTS THAT YOU WOULD BE BETTER OFF DEAD, OR OF HURTING YOURSELF: NOT AT ALL
SUM OF ALL RESPONSES TO PHQ9 QUESTIONS 1 & 2: 0
8. MOVING OR SPEAKING SO SLOWLY THAT OTHER PEOPLE COULD HAVE NOTICED. OR THE OPPOSITE, BEING SO FIGETY OR RESTLESS THAT YOU HAVE BEEN MOVING AROUND A LOT MORE THAN USUAL: NOT AT ALL
6. FEELING BAD ABOUT YOURSELF - OR THAT YOU ARE A FAILURE OR HAVE LET YOURSELF OR YOUR FAMILY DOWN: NOT AT ALL
5. POOR APPETITE OR OVEREATING: NOT AT ALL
SUM OF ALL RESPONSES TO PHQ QUESTIONS 1-9: 0
2. FEELING DOWN, DEPRESSED OR HOPELESS: NOT AT ALL

## 2024-12-07 ASSESSMENT — LIFESTYLE VARIABLES
HOW OFTEN DO YOU HAVE SIX OR MORE DRINKS ON ONE OCCASION: 1
HOW MANY STANDARD DRINKS CONTAINING ALCOHOL DO YOU HAVE ON A TYPICAL DAY: 1 OR 2
HOW MANY STANDARD DRINKS CONTAINING ALCOHOL DO YOU HAVE ON A TYPICAL DAY: 1
HOW OFTEN DO YOU HAVE A DRINK CONTAINING ALCOHOL: 2-3 TIMES A WEEK
HOW OFTEN DO YOU HAVE A DRINK CONTAINING ALCOHOL: 4

## 2024-12-10 ENCOUNTER — TELEMEDICINE (OUTPATIENT)
Dept: PRIMARY CARE CLINIC | Age: 74
End: 2024-12-10

## 2024-12-10 DIAGNOSIS — Z00.00 MEDICARE ANNUAL WELLNESS VISIT, SUBSEQUENT: Primary | ICD-10-CM

## 2024-12-12 DIAGNOSIS — I10 ESSENTIAL HYPERTENSION: ICD-10-CM

## 2024-12-12 RX ORDER — LISINOPRIL 20 MG/1
20 TABLET ORAL DAILY
Qty: 90 TABLET | Refills: 1 | OUTPATIENT
Start: 2024-12-12 | End: 2025-06-10

## 2024-12-12 NOTE — TELEPHONE ENCOUNTER
Medication:   Requested Prescriptions     Pending Prescriptions Disp Refills    lisinopril (PRINIVIL;ZESTRIL) 20 MG tablet [Pharmacy Med Name: LISINOPRIL 20 MG TABLET] 90 tablet 1     Sig: TAKE 1 TABLET BY MOUTH DAILY        Last Filled:      Patient Phone Number: [phone] (home) [phone] (work)    Last appt: 12/10/2024   Next appt: 2/5/2025    Last OARRS:        No data to display

## 2024-12-16 RX ORDER — LISINOPRIL 20 MG/1
20 TABLET ORAL DAILY
Qty: 90 TABLET | Refills: 1 | Status: SHIPPED | OUTPATIENT
Start: 2024-12-16 | End: 2025-06-14

## 2025-02-02 SDOH — ECONOMIC STABILITY: FOOD INSECURITY: WITHIN THE PAST 12 MONTHS, YOU WORRIED THAT YOUR FOOD WOULD RUN OUT BEFORE YOU GOT MONEY TO BUY MORE.: NEVER TRUE

## 2025-02-02 SDOH — ECONOMIC STABILITY: FOOD INSECURITY: WITHIN THE PAST 12 MONTHS, THE FOOD YOU BOUGHT JUST DIDN'T LAST AND YOU DIDN'T HAVE MONEY TO GET MORE.: NEVER TRUE

## 2025-02-02 SDOH — ECONOMIC STABILITY: INCOME INSECURITY: IN THE LAST 12 MONTHS, WAS THERE A TIME WHEN YOU WERE NOT ABLE TO PAY THE MORTGAGE OR RENT ON TIME?: NO

## 2025-02-02 SDOH — ECONOMIC STABILITY: TRANSPORTATION INSECURITY
IN THE PAST 12 MONTHS, HAS THE LACK OF TRANSPORTATION KEPT YOU FROM MEDICAL APPOINTMENTS OR FROM GETTING MEDICATIONS?: NO

## 2025-02-02 ASSESSMENT — PATIENT HEALTH QUESTIONNAIRE - PHQ9
5. POOR APPETITE OR OVEREATING: NOT AT ALL
1. LITTLE INTEREST OR PLEASURE IN DOING THINGS: NOT AT ALL
2. FEELING DOWN, DEPRESSED OR HOPELESS: NOT AT ALL
6. FEELING BAD ABOUT YOURSELF - OR THAT YOU ARE A FAILURE OR HAVE LET YOURSELF OR YOUR FAMILY DOWN: NOT AT ALL
3. TROUBLE FALLING OR STAYING ASLEEP: NOT AT ALL
SUM OF ALL RESPONSES TO PHQ QUESTIONS 1-9: 1
4. FEELING TIRED OR HAVING LITTLE ENERGY: SEVERAL DAYS
9. THOUGHTS THAT YOU WOULD BE BETTER OFF DEAD, OR OF HURTING YOURSELF: NOT AT ALL
10. IF YOU CHECKED OFF ANY PROBLEMS, HOW DIFFICULT HAVE THESE PROBLEMS MADE IT FOR YOU TO DO YOUR WORK, TAKE CARE OF THINGS AT HOME, OR GET ALONG WITH OTHER PEOPLE: NOT DIFFICULT AT ALL
8. MOVING OR SPEAKING SO SLOWLY THAT OTHER PEOPLE COULD HAVE NOTICED. OR THE OPPOSITE, BEING SO FIGETY OR RESTLESS THAT YOU HAVE BEEN MOVING AROUND A LOT MORE THAN USUAL: NOT AT ALL
7. TROUBLE CONCENTRATING ON THINGS, SUCH AS READING THE NEWSPAPER OR WATCHING TELEVISION: NOT AT ALL
10. IF YOU CHECKED OFF ANY PROBLEMS, HOW DIFFICULT HAVE THESE PROBLEMS MADE IT FOR YOU TO DO YOUR WORK, TAKE CARE OF THINGS AT HOME, OR GET ALONG WITH OTHER PEOPLE: NOT DIFFICULT AT ALL
8. MOVING OR SPEAKING SO SLOWLY THAT OTHER PEOPLE COULD HAVE NOTICED. OR THE OPPOSITE - BEING SO FIDGETY OR RESTLESS THAT YOU HAVE BEEN MOVING AROUND A LOT MORE THAN USUAL: NOT AT ALL
SUM OF ALL RESPONSES TO PHQ QUESTIONS 1-9: 1
2. FEELING DOWN, DEPRESSED OR HOPELESS: NOT AT ALL
SUM OF ALL RESPONSES TO PHQ QUESTIONS 1-9: 1
5. POOR APPETITE OR OVEREATING: NOT AT ALL
6. FEELING BAD ABOUT YOURSELF - OR THAT YOU ARE A FAILURE OR HAVE LET YOURSELF OR YOUR FAMILY DOWN: NOT AT ALL
1. LITTLE INTEREST OR PLEASURE IN DOING THINGS: NOT AT ALL
SUM OF ALL RESPONSES TO PHQ QUESTIONS 1-9: 1
9. THOUGHTS THAT YOU WOULD BE BETTER OFF DEAD, OR OF HURTING YOURSELF: NOT AT ALL
4. FEELING TIRED OR HAVING LITTLE ENERGY: SEVERAL DAYS
SUM OF ALL RESPONSES TO PHQ QUESTIONS 1-9: 1
7. TROUBLE CONCENTRATING ON THINGS, SUCH AS READING THE NEWSPAPER OR WATCHING TELEVISION: NOT AT ALL
SUM OF ALL RESPONSES TO PHQ9 QUESTIONS 1 & 2: 0
3. TROUBLE FALLING OR STAYING ASLEEP: NOT AT ALL

## 2025-02-05 ENCOUNTER — OFFICE VISIT (OUTPATIENT)
Dept: PRIMARY CARE CLINIC | Age: 75
End: 2025-02-05

## 2025-02-05 VITALS
DIASTOLIC BLOOD PRESSURE: 59 MMHG | WEIGHT: 166.4 LBS | TEMPERATURE: 98.3 F | SYSTOLIC BLOOD PRESSURE: 120 MMHG | HEIGHT: 66 IN | BODY MASS INDEX: 26.74 KG/M2 | HEART RATE: 80 BPM

## 2025-02-05 DIAGNOSIS — R42 DIZZINESS: ICD-10-CM

## 2025-02-05 DIAGNOSIS — I25.10 CORONARY ARTERY DISEASE INVOLVING NATIVE CORONARY ARTERY OF NATIVE HEART WITHOUT ANGINA PECTORIS: ICD-10-CM

## 2025-02-05 DIAGNOSIS — R93.89 ABNORMAL FINDINGS ON DIAGNOSTIC IMAGING OF OTHER SPECIFIED BODY STRUCTURES: ICD-10-CM

## 2025-02-05 DIAGNOSIS — I10 PRIMARY HYPERTENSION: Primary | ICD-10-CM

## 2025-02-05 DIAGNOSIS — D3A.00 CARCINOID TUMOR, UNSPECIFIED SITE, UNSPECIFIED WHETHER MALIGNANT: ICD-10-CM

## 2025-02-05 RX ORDER — ONDANSETRON 8 MG/1
8 TABLET, FILM COATED ORAL EVERY 8 HOURS PRN
COMMUNITY
Start: 2024-11-04

## 2025-02-05 SDOH — ECONOMIC STABILITY: FOOD INSECURITY: WITHIN THE PAST 12 MONTHS, YOU WORRIED THAT YOUR FOOD WOULD RUN OUT BEFORE YOU GOT MONEY TO BUY MORE.: NEVER TRUE

## 2025-02-05 SDOH — ECONOMIC STABILITY: FOOD INSECURITY: WITHIN THE PAST 12 MONTHS, THE FOOD YOU BOUGHT JUST DIDN'T LAST AND YOU DIDN'T HAVE MONEY TO GET MORE.: NEVER TRUE

## 2025-02-05 ASSESSMENT — ENCOUNTER SYMPTOMS
NAUSEA: 1
WHEEZING: 0
SHORTNESS OF BREATH: 0

## 2025-02-05 NOTE — PROGRESS NOTES
HENT:      Head: Normocephalic and atraumatic.      Right Ear: Tympanic membrane normal.      Left Ear: Tympanic membrane normal.      Nose: Nose normal.      Mouth/Throat:      Mouth: Mucous membranes are moist.      Pharynx: Oropharynx is clear.   Eyes:      Conjunctiva/sclera: Conjunctivae normal.      Pupils: Pupils are equal, round, and reactive to light.   Cardiovascular:      Rate and Rhythm: Normal rate and regular rhythm.      Pulses: Normal pulses.      Heart sounds: Normal heart sounds.   Pulmonary:      Effort: Pulmonary effort is normal.      Breath sounds: Normal breath sounds.   Abdominal:      General: Abdomen is flat.      Palpations: Abdomen is soft.   Musculoskeletal:         General: Normal range of motion.      Cervical back: Normal range of motion and neck supple.   Skin:     General: Skin is warm.   Neurological:      Mental Status: She is alert.   Psychiatric:         Mood and Affect: Mood normal.         Behavior: Behavior normal.         Carmelina Cavanaugh DO    This dictation was generated by voice recognition computer software.  Although all attempts are made to edit the dictation for accuracy, there may be errors in the transcription that are not intended.

## 2025-02-06 DIAGNOSIS — D75.89 MACROCYTOSIS: Primary | ICD-10-CM

## 2025-02-06 LAB
ALBUMIN SERPL-MCNC: 4.6 G/DL (ref 3.4–5)
ALBUMIN/GLOB SERPL: 1.9 {RATIO} (ref 1.1–2.2)
ALP SERPL-CCNC: 416 U/L (ref 40–129)
ALT SERPL-CCNC: 422 U/L (ref 10–40)
ANION GAP SERPL CALCULATED.3IONS-SCNC: 9 MMOL/L (ref 3–16)
AST SERPL-CCNC: 195 U/L (ref 15–37)
BASOPHILS # BLD: 0.1 K/UL (ref 0–0.2)
BASOPHILS NFR BLD: 1.2 %
BILIRUB SERPL-MCNC: 0.7 MG/DL (ref 0–1)
BUN SERPL-MCNC: 21 MG/DL (ref 7–20)
CALCIUM SERPL-MCNC: 9.7 MG/DL (ref 8.3–10.6)
CHLORIDE SERPL-SCNC: 98 MMOL/L (ref 99–110)
CHOLEST SERPL-MCNC: 232 MG/DL (ref 0–199)
CO2 SERPL-SCNC: 28 MMOL/L (ref 21–32)
CREAT SERPL-MCNC: 0.9 MG/DL (ref 0.6–1.2)
DEPRECATED RDW RBC AUTO: 17.5 % (ref 12.4–15.4)
EOSINOPHIL # BLD: 0.1 K/UL (ref 0–0.6)
EOSINOPHIL NFR BLD: 1.8 %
GFR SERPLBLD CREATININE-BSD FMLA CKD-EPI: 67 ML/MIN/{1.73_M2}
GLUCOSE SERPL-MCNC: 96 MG/DL (ref 70–99)
HCT VFR BLD AUTO: 37 % (ref 36–48)
HDLC SERPL-MCNC: 114 MG/DL (ref 40–60)
HGB BLD-MCNC: 12.3 G/DL (ref 12–16)
LDLC SERPL CALC-MCNC: 104 MG/DL
LYMPHOCYTES # BLD: 1.4 K/UL (ref 1–5.1)
LYMPHOCYTES NFR BLD: 27.6 %
MCH RBC QN AUTO: 39.1 PG (ref 26–34)
MCHC RBC AUTO-ENTMCNC: 33.3 G/DL (ref 31–36)
MCV RBC AUTO: 117.4 FL (ref 80–100)
MONOCYTES # BLD: 0.4 K/UL (ref 0–1.3)
MONOCYTES NFR BLD: 8.3 %
NEUTROPHILS # BLD: 3 K/UL (ref 1.7–7.7)
NEUTROPHILS NFR BLD: 61.1 %
PATH INTERP BLD-IMP: NORMAL
PATH INTERP BLD-IMP: YES
PLATELET # BLD AUTO: 191 K/UL (ref 135–450)
PMV BLD AUTO: 10.7 FL (ref 5–10.5)
POTASSIUM SERPL-SCNC: 4.5 MMOL/L (ref 3.5–5.1)
PROT SERPL-MCNC: 7 G/DL (ref 6.4–8.2)
RBC # BLD AUTO: 3.15 M/UL (ref 4–5.2)
SODIUM SERPL-SCNC: 135 MMOL/L (ref 136–145)
TRIGL SERPL-MCNC: 72 MG/DL (ref 0–150)
TSH SERPL DL<=0.005 MIU/L-ACNC: 2.23 UIU/ML (ref 0.27–4.2)
VLDLC SERPL CALC-MCNC: 14 MG/DL
WBC # BLD AUTO: 4.9 K/UL (ref 4–11)

## 2025-02-10 DIAGNOSIS — D75.89 MACROCYTOSIS: ICD-10-CM

## 2025-02-11 LAB
FOLATE SERPL-MCNC: 36.2 NG/ML (ref 4.78–24.2)
HCT VFR BLD AUTO: 36.4 % (ref 36–48)
IMMATURE RETIC FRACT: 0.32 (ref 0.21–0.37)
RETICS # AUTO: 0.04 M/UL (ref 0.02–0.1)
RETICS/RBC NFR AUTO: 1.29 % (ref 0.5–2.18)
VIT B12 SERPL-MCNC: 594 PG/ML (ref 211–911)

## 2025-02-12 DIAGNOSIS — I25.10 CORONARY ARTERY DISEASE DUE TO CALCIFIED CORONARY LESION: Primary | ICD-10-CM

## 2025-02-12 DIAGNOSIS — I25.84 CORONARY ARTERY DISEASE DUE TO CALCIFIED CORONARY LESION: Primary | ICD-10-CM

## 2025-02-12 RX ORDER — ROSUVASTATIN CALCIUM 20 MG/1
20 TABLET, COATED ORAL NIGHTLY
Qty: 90 TABLET | Refills: 1 | Status: SHIPPED | OUTPATIENT
Start: 2025-02-12

## 2025-02-21 NOTE — PROGRESS NOTES
[facility]      Cardiology Consult    Alanna Sam  1950 February 18, 2025    Referring Physician: Carmelina Cavanaugh DO  Reason for Referral: ***    CC: ***    HPI:  The patient is 74 y.o. female with a past medical history significant for hypertension, metastatic breast cancer s/p bilteral mastectomy (2004), CAD, hyperlipidemia     -referral for CAD - Coronary calcifications on CT in 2023, CT calcium score ordered??    Past Medical History:   Diagnosis Date    Allergic rhinitis     Basal cell cancer 05/2014    Forehead: Dr. Mcconnell    BRCA positive     Breast cancer (HCC) 2004    Breast mass 2004    Calculus of gallbladder without cholecystitis without obstruction 02/28/2023    Depressive disorder     Endometriosis 1993    Fibrocystic breast 1993    Fibroid 1993    Full dentures     partial    Hearing loss     HTN (hypertension)     Hx of bilateral mastectomy 07/27/2015    Hypercholesterolemia     Malignant neoplasm of female breast (HCC)     s/p B Mastectomy - Dr. Mukherjee 2004    Menopause ovarian failure 2004    Mild aortic sclerosis 01/17/2019    ECHO 01/17/19    Neuroendocrine tumor of liver 02/28/2023    Non-thrombocytopenic purpura (HCC)     Osteopenia     DXA 10/12/11 Improving densitiy L hip    Ovarian cyst 1993    Skin lesion     4mm lesion L nose- dr. Mcconnell - (benign)    Stress incontinence 10/05/2012    Trace aortic valve regurgitation 01/17/2019    ECHO 01/17/19    Vertigo     Wears glasses      Past Surgical History:   Procedure Laterality Date    ABDOMEN SURGERY  2023    Gallbladder removed    APPENDECTOMY  01/01/1975    BREAST BIOPSY  2004    CHOLECYSTECTOMY, LAPAROSCOPIC N/A 08/14/2023    ROBOTIC / LAPAROSCOPIC CHOLECYSTECTOMY, LIVER NODULE EXCISION, MESENTERIC NODULE EXCISION performed by Heriberto Archer MD at [facility] OR    COLECTOMY N/A 08/14/2023    . performed by Heriberto Archer MD at [facility] OR    COLONOSCOPY  08/11/2006    CT NEEDLE BIOPSY LIVER 
No temperature intolerance. No excessive thirst, fluid intake, or urination. No tremor.  Hematologic/Lymphatic: No abnormal bruising or bleeding, blood clots or swollen lymph nodes.  Allergic/Immunologic: No nasal congestion or hives.    Physical Exam:   /62   Pulse 73   Ht 1.676 m (5' 6\")   Wt 75.3 kg (166 lb)   SpO2 97%   BMI 26.79 kg/m²   Wt Readings from Last 3 Encounters:   02/24/25 75.3 kg (166 lb)   02/05/25 75.5 kg (166 lb 6.4 oz)   08/30/24 74.8 kg (165 lb)     Constitutional: She is oriented to person, place, and time. She appears well-developed and well-nourished. In no acute distress.   Head: Normocephalic and atraumatic. Pupils equal and round.  Neck: Neck supple. No JVP or carotid bruit appreciated. No mass and no thyromegaly present. No lymphadenopathy present.  Cardiovascular: Normal rate. Normal heart sounds. Exam reveals no gallop and no friction rub. No murmur heard.  Pulmonary/Chest: Effort normal and breath sounds normal. No respiratory distress. She has no wheezes, rhonchi or rales.   Abdominal: Soft, non-tender. Bowel sounds are normal. She exhibits no organomegaly, mass or bruit.   Extremities: No edema. No cyanosis or clubbing. Pulses are 2+ radial/carotid bilaterally.  Neurological: No gross cranial nerve deficit. Coordination normal.   Skin: Skin is warm and dry. There is no rash or diaphoresis.   Psychiatric: She has a normal mood and affect. Her speech is normal and behavior is normal.     Lab Review:   FLP:    Lab Results   Component Value Date/Time    TRIG 72 02/05/2025 11:55 AM     02/05/2025 11:55 AM     BUN/Creatinine:    Lab Results   Component Value Date/Time    BUN 21 02/05/2025 11:55 AM    CREATININE 0.9 02/05/2025 11:55 AM     Hemoglobin A1C   Date Value Ref Range Status   08/05/2024 5.5 See comment % Final     Comment:     Comment:  Diagnosis of Diabetes: > or = 6.5%  Increased risk of diabetes (Prediabetes): 5.7-6.4%  Glycemic Control: Nonpregnant Adults:

## 2025-02-24 ENCOUNTER — OFFICE VISIT (OUTPATIENT)
Dept: CARDIOLOGY CLINIC | Age: 75
End: 2025-02-24
Payer: MEDICARE

## 2025-02-24 VITALS
OXYGEN SATURATION: 97 % | HEART RATE: 73 BPM | SYSTOLIC BLOOD PRESSURE: 124 MMHG | WEIGHT: 166 LBS | DIASTOLIC BLOOD PRESSURE: 62 MMHG | HEIGHT: 66 IN | BODY MASS INDEX: 26.68 KG/M2

## 2025-02-24 DIAGNOSIS — I25.10 CORONARY ARTERY CALCIFICATION: Primary | ICD-10-CM

## 2025-02-24 DIAGNOSIS — E78.2 MIXED HYPERLIPIDEMIA: ICD-10-CM

## 2025-02-24 DIAGNOSIS — I10 ESSENTIAL HYPERTENSION: ICD-10-CM

## 2025-02-24 PROCEDURE — G9708 BILAT MAST/HX BI /UNILAT MAS: HCPCS | Performed by: INTERNAL MEDICINE

## 2025-02-24 PROCEDURE — 3078F DIAST BP <80 MM HG: CPT | Performed by: INTERNAL MEDICINE

## 2025-02-24 PROCEDURE — 1036F TOBACCO NON-USER: CPT | Performed by: INTERNAL MEDICINE

## 2025-02-24 PROCEDURE — 99204 OFFICE O/P NEW MOD 45 MIN: CPT | Performed by: INTERNAL MEDICINE

## 2025-02-24 PROCEDURE — 1123F ACP DISCUSS/DSCN MKR DOCD: CPT | Performed by: INTERNAL MEDICINE

## 2025-02-24 PROCEDURE — G8399 PT W/DXA RESULTS DOCUMENT: HCPCS | Performed by: INTERNAL MEDICINE

## 2025-02-24 PROCEDURE — G8417 CALC BMI ABV UP PARAM F/U: HCPCS | Performed by: INTERNAL MEDICINE

## 2025-02-24 PROCEDURE — 1159F MED LIST DOCD IN RCRD: CPT | Performed by: INTERNAL MEDICINE

## 2025-02-24 PROCEDURE — G8427 DOCREV CUR MEDS BY ELIG CLIN: HCPCS | Performed by: INTERNAL MEDICINE

## 2025-02-24 PROCEDURE — 3074F SYST BP LT 130 MM HG: CPT | Performed by: INTERNAL MEDICINE

## 2025-02-24 PROCEDURE — 1090F PRES/ABSN URINE INCON ASSESS: CPT | Performed by: INTERNAL MEDICINE

## 2025-02-24 PROCEDURE — 3017F COLORECTAL CA SCREEN DOC REV: CPT | Performed by: INTERNAL MEDICINE

## 2025-02-25 ENCOUNTER — TELEPHONE (OUTPATIENT)
Dept: SURGERY | Age: 75
End: 2025-02-25

## 2025-03-20 NOTE — PROGRESS NOTES
[facility] SURGICAL ONCOLOGY  [address] [place] [address]  [address]  [facility] [address]  Dept: [phone]  Dept Fax: [phone]    Visit Date: 3/25/2025    HPI:   Alanna Sam is a 74 y.o. female who is here today to follow up on her neuroendocrine tumor (s/p mesenteric nodule excision, robotic cholecystectomy, and liver nodule excision 8/14/23).     In February she 2023 presented with a large liver mass which was biopsied and was reported to be a well-differentiated neuroendocrine tumor, grade 2, low Ki-67 of 5%. Treated with monthly octreotide starting March 2023. In May 2023 she underwent Therasphere treatment.     She then required a cholecystectomy due to symptomatic cholelithiasis- this was done in August 2023.     Repeat imaging in October 2023 showed progression of disease in the liver. Her liver biopsy was repeated in November 2023 which confirmed neuroendocrine tumor with a low proliferative index, however upon further examination the tumor was strongly estrogen and progesterone receptor positive and GATA3 positive indicating very likely metastatic breast cancer with neuroendocrine features. Unfortunately in December 2023 she was found to have bone mets.     Her November 2024 bone scan was stable, and her January 2025 CT was also stable. Continues Exemestane, Olaparib, and Zometa and is tolerating this well.    Currently patient denies abdominal pain, recent change in weight, appetite or bowel pattern. No blood in stool. Endorses increased fatigue and generalized aches since starting lipid lowering meds.    Past Medical History:   Diagnosis Date    Allergic rhinitis     Basal cell cancer 05/2014    Forehead: Dr. Mcconnell    BRCA positive     Breast cancer (HCC) 2004    Breast mass 2004    Calculus of gallbladder without cholecystitis without obstruction 02/28/2023    Depressive disorder     Endometriosis 1993    Fibrocystic breast 1993    Fibroid 1993    Full dentures     partial    Hearing loss     HTN

## 2025-03-25 ENCOUNTER — OFFICE VISIT (OUTPATIENT)
Dept: SURGERY | Age: 75
End: 2025-03-25
Payer: MEDICARE

## 2025-03-25 VITALS
TEMPERATURE: 97.6 F | SYSTOLIC BLOOD PRESSURE: 129 MMHG | WEIGHT: 165 LBS | HEART RATE: 69 BPM | BODY MASS INDEX: 26.52 KG/M2 | HEIGHT: 66 IN | DIASTOLIC BLOOD PRESSURE: 75 MMHG

## 2025-03-25 DIAGNOSIS — C50.919 CARCINOMA OF BREAST METASTATIC TO LIVER, UNSPECIFIED LATERALITY: ICD-10-CM

## 2025-03-25 DIAGNOSIS — D3A.8 NEUROENDOCRINE TUMOR OF LIVER: Primary | ICD-10-CM

## 2025-03-25 DIAGNOSIS — C78.7 CARCINOMA OF BREAST METASTATIC TO LIVER, UNSPECIFIED LATERALITY: ICD-10-CM

## 2025-03-25 PROCEDURE — 1159F MED LIST DOCD IN RCRD: CPT | Performed by: SURGERY

## 2025-03-25 PROCEDURE — 3078F DIAST BP <80 MM HG: CPT | Performed by: SURGERY

## 2025-03-25 PROCEDURE — 3017F COLORECTAL CA SCREEN DOC REV: CPT | Performed by: SURGERY

## 2025-03-25 PROCEDURE — G8427 DOCREV CUR MEDS BY ELIG CLIN: HCPCS | Performed by: SURGERY

## 2025-03-25 PROCEDURE — 1123F ACP DISCUSS/DSCN MKR DOCD: CPT | Performed by: SURGERY

## 2025-03-25 PROCEDURE — G8417 CALC BMI ABV UP PARAM F/U: HCPCS | Performed by: SURGERY

## 2025-03-25 PROCEDURE — G9708 BILAT MAST/HX BI /UNILAT MAS: HCPCS | Performed by: SURGERY

## 2025-03-25 PROCEDURE — G8399 PT W/DXA RESULTS DOCUMENT: HCPCS | Performed by: SURGERY

## 2025-03-25 PROCEDURE — 1160F RVW MEDS BY RX/DR IN RCRD: CPT | Performed by: SURGERY

## 2025-03-25 PROCEDURE — 1090F PRES/ABSN URINE INCON ASSESS: CPT | Performed by: SURGERY

## 2025-03-25 PROCEDURE — 3074F SYST BP LT 130 MM HG: CPT | Performed by: SURGERY

## 2025-03-25 PROCEDURE — 1036F TOBACCO NON-USER: CPT | Performed by: SURGERY

## 2025-03-25 PROCEDURE — 99213 OFFICE O/P EST LOW 20 MIN: CPT | Performed by: SURGERY

## 2025-06-12 DIAGNOSIS — I10 ESSENTIAL HYPERTENSION: ICD-10-CM

## 2025-06-12 RX ORDER — LISINOPRIL 20 MG/1
20 TABLET ORAL DAILY
Qty: 90 TABLET | Refills: 1 | Status: SHIPPED | OUTPATIENT
Start: 2025-06-12 | End: 2025-12-09

## 2025-06-12 NOTE — TELEPHONE ENCOUNTER
Medication:   Requested Prescriptions     Pending Prescriptions Disp Refills    lisinopril (PRINIVIL;ZESTRIL) 20 MG tablet [Pharmacy Med Name: LISINOPRIL 20 MG TABLET] 90 tablet 1     Sig: TAKE 1 TABLET BY MOUTH DAILY        Last Filled:  12/16/24    Patient Phone Number: [phone] (home) [phone] (work)    Last appt: 2/5/2025   Next appt: 8/5/2025    Last OARRS:        No data to display

## 2025-08-01 DIAGNOSIS — I10 ESSENTIAL HYPERTENSION: ICD-10-CM

## 2025-08-01 RX ORDER — METOPROLOL TARTRATE 25 MG/1
TABLET, FILM COATED ORAL
Qty: 180 TABLET | Refills: 3 | Status: SHIPPED | OUTPATIENT
Start: 2025-08-01

## 2025-08-01 NOTE — TELEPHONE ENCOUNTER
Medication:   Requested Prescriptions     Pending Prescriptions Disp Refills    metoprolol tartrate (LOPRESSOR) 25 MG tablet 180 tablet 3     Sig: TAKE 1 TABLET BY MOUTH TWICE A DAY        Last Filled:  7/22/24    Patient Phone Number: [phone] (home) [phone] (work)    Last appt: 2/5/2025   Next appt: 8/14/2025    Last OARRS:        No data to display

## 2025-08-07 DIAGNOSIS — I25.10 CORONARY ARTERY DISEASE DUE TO CALCIFIED CORONARY LESION: ICD-10-CM

## 2025-08-07 DIAGNOSIS — I25.84 CORONARY ARTERY DISEASE DUE TO CALCIFIED CORONARY LESION: ICD-10-CM

## 2025-08-07 DIAGNOSIS — F32.A DEPRESSIVE DISORDER: ICD-10-CM

## 2025-08-07 RX ORDER — ROSUVASTATIN CALCIUM 20 MG/1
TABLET, COATED ORAL
Qty: 90 TABLET | Refills: 1 | Status: SHIPPED | OUTPATIENT
Start: 2025-08-07

## 2025-08-07 RX ORDER — VENLAFAXINE HYDROCHLORIDE 75 MG/1
75 CAPSULE, EXTENDED RELEASE ORAL DAILY
Qty: 90 CAPSULE | Refills: 3 | Status: SHIPPED | OUTPATIENT
Start: 2025-08-07

## 2025-08-14 ENCOUNTER — OFFICE VISIT (OUTPATIENT)
Dept: PRIMARY CARE CLINIC | Age: 75
End: 2025-08-14

## 2025-08-14 VITALS
HEART RATE: 70 BPM | HEIGHT: 66 IN | TEMPERATURE: 97 F | BODY MASS INDEX: 26.23 KG/M2 | WEIGHT: 163.2 LBS | DIASTOLIC BLOOD PRESSURE: 61 MMHG | SYSTOLIC BLOOD PRESSURE: 126 MMHG

## 2025-08-14 DIAGNOSIS — I10 PRIMARY HYPERTENSION: Primary | ICD-10-CM

## 2025-08-14 DIAGNOSIS — E78.00 PURE HYPERCHOLESTEROLEMIA: ICD-10-CM

## 2025-08-14 ASSESSMENT — ENCOUNTER SYMPTOMS
NAUSEA: 0
SHORTNESS OF BREATH: 0
WHEEZING: 0

## 2025-08-15 LAB
CHOLEST SERPL-MCNC: 235 MG/DL (ref 0–199)
HDLC SERPL-MCNC: 119 MG/DL (ref 40–60)
LDLC SERPL CALC-MCNC: 101 MG/DL
TRIGL SERPL-MCNC: 76 MG/DL (ref 0–150)
VLDLC SERPL CALC-MCNC: 15 MG/DL

## 2025-08-26 DIAGNOSIS — I10 ESSENTIAL HYPERTENSION: ICD-10-CM

## 2025-08-27 RX ORDER — AMLODIPINE BESYLATE 5 MG/1
TABLET ORAL
Qty: 90 TABLET | Refills: 3 | Status: SHIPPED | OUTPATIENT
Start: 2025-08-27

## (undated) DEVICE — SOLUTION INJ LR VISIV 1000ML BG

## (undated) DEVICE — LARGE HEM-O-LOK CLIP APPLIER: Brand: ENDOWRIST

## (undated) DEVICE — BLADE ES ELASTOMERIC COAT INSUL DURABLE BEND UPTO 90DEG

## (undated) DEVICE — SPONGE,LAP,4"X18",XR,ST,5/PK,40PK/CS: Brand: MEDLINE INDUSTRIES, INC.

## (undated) DEVICE — GLOVE SURG SZ 7 L12IN FNGR THK79MIL GRN LTX FREE

## (undated) DEVICE — TIP COVER ACCESSORY

## (undated) DEVICE — TROCAR: Brand: KII FIOS FIRST ENTRY

## (undated) DEVICE — BLADELESS OBTURATOR: Brand: WECK VISTA

## (undated) DEVICE — TIP-UP FENESTRATED GRASPER: Brand: ENDOWRIST

## (undated) DEVICE — Device

## (undated) DEVICE — SOLUTION ANTIFOG VIS SYS CLEARIFY LAPSCP

## (undated) DEVICE — CORD ES L10FT MPLR LAP

## (undated) DEVICE — SCISSORS SURG DIA8MM MPLR CRV ENDOWRIST

## (undated) DEVICE — GLOVE SURG SZ 7 L12IN FNGR THK75MIL WHT LTX POLYMER BEAD

## (undated) DEVICE — SUTURE CTD ANTBCTRL 54 IN TI VCRL PLU VLT

## (undated) DEVICE — CANNULA SEAL

## (undated) DEVICE — GAUZE,SPONGE,NW,4"X4",4PLY,STRL,LF,2/PK: Brand: MEDLINE

## (undated) DEVICE — STRIP,CLOSURE,WOUND,MEDI-STRIP,1/2X4: Brand: MEDLINE

## (undated) DEVICE — ROBOTIC: Brand: MEDLINE INDUSTRIES, INC.

## (undated) DEVICE — LIQUIBAND RAPID ADHESIVE 36/CS 0.8ML: Brand: MEDLINE

## (undated) DEVICE — FENESTRATED BIPOLAR FORCEPS: Brand: ENDOWRIST

## (undated) DEVICE — TROCAR: Brand: KII SLEEVE

## (undated) DEVICE — SPONGE,LAP,18"X18",DLX,XR,ST,5/PK,40/PK: Brand: MEDLINE

## (undated) DEVICE — TISSUE RETRIEVAL SYSTEM: Brand: INZII RETRIEVAL SYSTEM

## (undated) DEVICE — SYRINGE 20ML LL S/C 50

## (undated) DEVICE — NEEDLE INSUF L120MM DIA2MM DISP FOR PNEUMOPERI ENDOPATH

## (undated) DEVICE — BLADE,CARBON-STEEL,10,STRL,DISPOSABLE,TB: Brand: MEDLINE

## (undated) DEVICE — MARYLAND BIPOLAR FORCEPS: Brand: ENDOWRIST

## (undated) DEVICE — LAPAROSCOPIC SCISSORS: Brand: EPIX LAPAROSCOPIC SCISSORS

## (undated) DEVICE — TOWEL,STOP FLAG GOLD N-W: Brand: MEDLINE

## (undated) DEVICE — NEEDLE,22GX1.5",REG,BEVEL: Brand: MEDLINE

## (undated) DEVICE — APPLICATOR MEDICATED 26 CC SOLUTION HI LT ORNG CHLORAPREP

## (undated) DEVICE — 1LYRTR 16FR10ML100%SIL UMS SNP: Brand: MEDLINE INDUSTRIES, INC.

## (undated) DEVICE — SUTURE MCRYL + SZ 4-0 L27IN ABSRB UD L19MM PS-2 3/8 CIR MCP426H

## (undated) DEVICE — ARM DRAPE

## (undated) DEVICE — CATHETER URETH 16FR 5CC BLLN SIL ELASTMR F 2 W

## (undated) DEVICE — 3M™ IOBAN™ 2 ANTIMICROBIAL INCISE DRAPE 6648EZ: Brand: IOBAN™ 2

## (undated) DEVICE — SYRINGE MED 10ML LUERLOCK TIP W/O SFTY DISP

## (undated) DEVICE — SYSTEM SMK EVAC LAP TBNG FILTER HSNG BENT STYL PNK SEE CLR

## (undated) DEVICE — PUMP SUC IRR TBNG L10FT W/ HNDPC ASSEMB STRYKEFLOW 2

## (undated) DEVICE — GOWN,SIRUS,POLYRNF,BRTHSLV,LG,30/CS: Brand: MEDLINE

## (undated) DEVICE — SUTURE STRATAFIX SPRL PDS + SZ 2-0 L6IN ABSRB VLT L36MM SXPP1B409

## (undated) DEVICE — 40583 XL ADVANCED TRENDELENBURG POSITIONING KIT: Brand: 40583 XL ADVANCED TRENDELENBURG POSITIONING KIT